# Patient Record
Sex: FEMALE | Race: BLACK OR AFRICAN AMERICAN | Employment: UNEMPLOYED | URBAN - METROPOLITAN AREA
[De-identification: names, ages, dates, MRNs, and addresses within clinical notes are randomized per-mention and may not be internally consistent; named-entity substitution may affect disease eponyms.]

---

## 2017-03-13 ENCOUNTER — ED HISTORICAL/CONVERTED ENCOUNTER (OUTPATIENT)
Dept: OTHER | Age: 72
End: 2017-03-13

## 2017-03-24 ENCOUNTER — ED HISTORICAL/CONVERTED ENCOUNTER (OUTPATIENT)
Dept: OTHER | Age: 72
End: 2017-03-24

## 2019-08-04 ENCOUNTER — ED HISTORICAL/CONVERTED ENCOUNTER (OUTPATIENT)
Dept: OTHER | Age: 74
End: 2019-08-04

## 2019-08-22 ENCOUNTER — OP HISTORICAL/CONVERTED ENCOUNTER (OUTPATIENT)
Dept: OTHER | Age: 74
End: 2019-08-22

## 2020-01-29 ENCOUNTER — ED HISTORICAL/CONVERTED ENCOUNTER (OUTPATIENT)
Dept: OTHER | Age: 75
End: 2020-01-29

## 2020-06-04 ENCOUNTER — ED HISTORICAL/CONVERTED ENCOUNTER (OUTPATIENT)
Dept: OTHER | Age: 75
End: 2020-06-04

## 2020-09-27 ENCOUNTER — APPOINTMENT (OUTPATIENT)
Dept: GENERAL RADIOLOGY | Age: 75
End: 2020-09-27
Attending: PHYSICIAN ASSISTANT
Payer: MEDICARE

## 2020-09-27 ENCOUNTER — HOSPITAL ENCOUNTER (EMERGENCY)
Age: 75
Discharge: HOME | End: 2020-09-27
Payer: MEDICARE

## 2020-09-27 VITALS
WEIGHT: 183 LBS | HEART RATE: 70 BPM | RESPIRATION RATE: 17 BRPM | OXYGEN SATURATION: 98 % | TEMPERATURE: 98.7 F | BODY MASS INDEX: 39.48 KG/M2 | SYSTOLIC BLOOD PRESSURE: 144 MMHG | DIASTOLIC BLOOD PRESSURE: 77 MMHG | HEIGHT: 57 IN

## 2020-09-27 DIAGNOSIS — E11.65 TYPE 2 DIABETES MELLITUS WITH HYPERGLYCEMIA, WITH LONG-TERM CURRENT USE OF INSULIN (HCC): Primary | ICD-10-CM

## 2020-09-27 DIAGNOSIS — Z79.4 TYPE 2 DIABETES MELLITUS WITH HYPERGLYCEMIA, WITH LONG-TERM CURRENT USE OF INSULIN (HCC): Primary | ICD-10-CM

## 2020-09-27 LAB
ALBUMIN SERPL-MCNC: 3.5 G/DL (ref 3.5–5)
ALBUMIN/GLOB SERPL: 0.8 {RATIO} (ref 1.1–2.2)
ALP SERPL-CCNC: 86 U/L (ref 45–117)
ALT SERPL-CCNC: 20 U/L (ref 12–78)
ANION GAP SERPL CALC-SCNC: 7 MMOL/L (ref 5–15)
APPEARANCE UR: CLEAR
AST SERPL W P-5'-P-CCNC: 19 U/L (ref 15–37)
ATRIAL RATE: 75 BPM
BACTERIA URNS QL MICRO: NEGATIVE /HPF
BACTERIA URNS QL MICRO: NEGATIVE /HPF
BASE EXCESS BLDV CALC-SCNC: 0.9 MMOL/L (ref 0–2)
BASOPHILS # BLD: 0 K/UL (ref 0–0.1)
BASOPHILS NFR BLD: 1 % (ref 0–1)
BDY SITE: ABNORMAL
BILIRUB SERPL-MCNC: 0.3 MG/DL (ref 0.2–1)
BILIRUB UR QL: NEGATIVE
BUN SERPL-MCNC: 31 MG/DL (ref 6–20)
BUN/CREAT SERPL: 32 (ref 12–20)
CA-I BLD-MCNC: 9.8 MG/DL (ref 8.5–10.1)
CALCULATED P AXIS, ECG09: 34 DEGREES
CALCULATED R AXIS, ECG10: 17 DEGREES
CALCULATED T AXIS, ECG11: 27 DEGREES
CHLORIDE SERPL-SCNC: 100 MMOL/L (ref 97–108)
CO2 SERPL-SCNC: 26 MMOL/L (ref 21–32)
COHGB MFR BLD: 1.6 % (ref 0–3)
COLOR UR: ABNORMAL
CREAT SERPL-MCNC: 0.97 MG/DL (ref 0.55–1.02)
DIAGNOSIS, 93000: NORMAL
DIFFERENTIAL METHOD BLD: ABNORMAL
EOSINOPHIL # BLD: 0.1 K/UL (ref 0–0.4)
EOSINOPHIL NFR BLD: 2 % (ref 0–7)
EPAP/CPAP/PEEP, PAPEEP: 0
ERYTHROCYTE [DISTWIDTH] IN BLOOD BY AUTOMATED COUNT: 13.6 % (ref 11.5–14.5)
FIO2 ON VENT: 21 %
GLOBULIN SER CALC-MCNC: 4.5 G/DL (ref 2–4)
GLUCOSE BLD STRIP.AUTO-MCNC: 311 MG/DL (ref 65–100)
GLUCOSE SERPL-MCNC: 293 MG/DL (ref 65–100)
GLUCOSE UR STRIP.AUTO-MCNC: >300 MG/DL
HCO3 BLDV-SCNC: 25 MMOL/L (ref 22–26)
HCT VFR BLD AUTO: 38.3 % (ref 35–47)
HGB BLD OXIMETRY-MCNC: 13.2 G/DL (ref 12–16)
HGB BLD-MCNC: 12.3 % (ref 11.5–16)
HGB UR QL STRIP: NEGATIVE
IMM GRANULOCYTES # BLD AUTO: 0.1 K/UL (ref 0–0.04)
IMM GRANULOCYTES NFR BLD AUTO: 1 % (ref 0–0.5)
KETONES UR QL STRIP.AUTO: NEGATIVE MG/DL
LACTATE SERPL-SCNC: 1.2 MMOL/L (ref 0.4–2)
LEUKOCYTE ESTERASE UR QL STRIP.AUTO: ABNORMAL
LYMPHOCYTES # BLD: 2.7 K/UL (ref 0.8–3.5)
LYMPHOCYTES NFR BLD: 46 % (ref 12–49)
MAGNESIUM SERPL-MCNC: 2.4 MG/DL (ref 1.6–2.4)
MCH RBC QN AUTO: 29 PG (ref 26–34)
MCHC RBC AUTO-ENTMCNC: 32.1 G/DL (ref 30–36.5)
MCV RBC AUTO: 90.3 FL (ref 80–99)
METHGB MFR BLD: 0.8 % (ref 0–3)
MONOCYTES # BLD: 0.4 K/UL (ref 0–1)
MONOCYTES NFR BLD: 6 % (ref 5–13)
NEUTS SEG # BLD: 2.5 K/UL (ref 1.8–8)
NEUTS SEG NFR BLD: 44 % (ref 32–75)
NITRITE UR QL STRIP.AUTO: NEGATIVE
P-R INTERVAL, ECG05: 186 MS
PCO2 BLDV: 50 MMHG (ref 40–50)
PERFORMED BY, TECHID: ABNORMAL
PH BLDV: 7.34 [PH] (ref 7.31–7.41)
PH UR STRIP: 7 [PH] (ref 5–8)
PLATELET # BLD AUTO: 202 K/UL (ref 150–400)
PMV BLD AUTO: 11.9 FL (ref 8.9–12.9)
PO2 BLDV: 96 MMHG (ref 36–42)
POTASSIUM SERPL-SCNC: 4.7 MMOL/L (ref 3.5–5.1)
PROT SERPL-MCNC: 8 G/DL (ref 6.4–8.2)
PROT UR STRIP-MCNC: NEGATIVE MG/DL
Q-T INTERVAL, ECG07: 366 MS
QRS DURATION, ECG06: 90 MS
QTC CALCULATION (BEZET), ECG08: 408 MS
RBC # BLD AUTO: 4.24 M/UL (ref 3.8–5.2)
RBC #/AREA URNS HPF: ABNORMAL /HPF (ref 0–5)
RBC #/AREA URNS HPF: NORMAL /HPF (ref 0–5)
SAO2 % BLDV: 96 % (ref 60–80)
SAO2% DEVICE SAO2% SENSOR NAME: ABNORMAL
SODIUM SERPL-SCNC: 133 MMOL/L (ref 136–145)
SP GR UR REFRACTOMETRY: 1.02 (ref 1–1.03)
TROPONIN I SERPL-MCNC: <0.05 NG/ML
UROBILINOGEN UR QL STRIP.AUTO: 0.1 EU/DL (ref 0.1–1)
VENTRICULAR RATE, ECG03: 75 BPM
WBC # BLD AUTO: 5.6 K/UL (ref 3.6–11)
WBC URNS QL MICRO: ABNORMAL /HPF (ref 0–4)
WBC URNS QL MICRO: NORMAL /HPF (ref 0–4)

## 2020-09-27 PROCEDURE — 99284 EMERGENCY DEPT VISIT MOD MDM: CPT

## 2020-09-27 PROCEDURE — 80053 COMPREHEN METABOLIC PANEL: CPT

## 2020-09-27 PROCEDURE — 93005 ELECTROCARDIOGRAM TRACING: CPT

## 2020-09-27 PROCEDURE — 82962 GLUCOSE BLOOD TEST: CPT

## 2020-09-27 PROCEDURE — 74011250636 HC RX REV CODE- 250/636: Performed by: PHYSICIAN ASSISTANT

## 2020-09-27 PROCEDURE — 84484 ASSAY OF TROPONIN QUANT: CPT

## 2020-09-27 PROCEDURE — 82803 BLOOD GASES ANY COMBINATION: CPT

## 2020-09-27 PROCEDURE — 83605 ASSAY OF LACTIC ACID: CPT

## 2020-09-27 PROCEDURE — 71045 X-RAY EXAM CHEST 1 VIEW: CPT

## 2020-09-27 PROCEDURE — 81001 URINALYSIS AUTO W/SCOPE: CPT

## 2020-09-27 PROCEDURE — 83735 ASSAY OF MAGNESIUM: CPT

## 2020-09-27 PROCEDURE — 85025 COMPLETE CBC W/AUTO DIFF WBC: CPT

## 2020-09-27 RX ADMIN — SODIUM CHLORIDE 1000 ML: 9 INJECTION, SOLUTION INTRAVENOUS at 13:01

## 2020-09-27 NOTE — ED TRIAGE NOTES
Pt states that her sugar this morning was about 422. Took her insulin this morning without much change in accu check. Pt also c/o headache.

## 2020-09-27 NOTE — ED PROVIDER NOTES
EMERGENCY DEPARTMENT HISTORY AND PHYSICAL EXAM      Date: 9/27/2020  Patient Name: Nubia Almeida    History of Presenting Illness     Chief Complaint   Patient presents with    High Blood Sugar       History Provided By: Patient    HPI: Nubia Almeida, 76 y.o. female with a past medical history significant for diabetes on Lantus 62 units QD and Novolog 14 units BID, HTN, anxiety, arthritis, ambulatory with cane at baseline who presents to the ED with cc of hyperglycemia since this morning. Patient reports about 2 weeks ago her PCP discontinued metformin 1000 mg twice daily that she was taking due to side effects of diarrhea. Since then, her blood sugar has been steadily going up and reaching the 300s-400s. This morning she checked her blood sugar and found it to be 422. She then took Lantus 62 units and NovoLog 14 units and notes her sugar did not go down much. Patient also complains of nausea, polyuria, lightheadedness, diffuse intermittent headaches, nonradiating right-sided achy chest pain, and intermittent diarrhea x3 days. No recent fall, injury, trauma. Denies history of heart disease. Patient denies fever, chills, shortness of breath, vomiting, hematochezia, hematemesis, melena, abdominal pain, flank pain, back pain, visual changes, numbness, tingling. There are no other complaints, changes, or physical findings at this time. PCP: Edi Sol MD    Current Outpatient Medications   Medication Sig Dispense Refill    INCRUSE ELLIPTA 62.5 mcg/actuation dsdv Take 1 Puff by mouth daily. 5    ARIPiprazole (ABILIFY) 2 mg tablet Take 1 Tab by mouth nightly. Take to block agitation  1    gabapentin (NEURONTIN) 100 mg capsule Take 2 Caps by mouth three (3) times daily. 2    ibuprofen (MOTRIN) 600 mg tablet Take 1 Tab by mouth three (3) times daily. Take for arthritis  1    NOVOLIN 70/30 100 unit/mL (70-30) injection by SubCUTAneous route two (2) times a day.  Take 60 units in the AM and 54 units in the PM  Indications: PATIENT TAKES 64 IN THE AM AND 54 IN THE PM  1    levocetirizine (XYZAL) 5 mg tablet Take 1 Tab by mouth nightly. allergies  1    losartan (COZAAR) 100 mg tablet Take 1 Tab by mouth daily. hypertention  1    montelukast (SINGULAIR) 10 mg tablet Take 1 Tab by mouth every evening. allergies  1    NITROSTAT 0.4 mg SL tablet Take 1 Tab by mouth. Place one tablet under tongue PRN for chest pain, may repeat every 5 minutes for 2 more times then call 911  1    Insulin Syringe-Needle U-100 1 mL 31 x 5/16\" syrg   1    tolterodine ER (DETROL LA) 4 mg ER capsule Take 1 Cap by mouth daily. 0    SOTALOL HCL (SOTALOL PO) Take 80 mg by mouth two (2) times a day.  cyclobenzaprine (FLEXERIL) 10 mg tablet Take 1 Tab by mouth three (3) times daily as needed for Muscle Spasm(s). 20 Tab 0       Past History     Past Medical History:  Past Medical History:   Diagnosis Date    Acid indigestion 3/24/2016    Anxiety 3/24/2016    Arthritis     Asthma     Asthma 3/24/2016    Blurred vision, bilateral 3/24/2016    Chest pain 3/24/2016    sometimes    Constipation 3/24/2016    Coughing 3/24/2016    Diabetes ([address]     Dizziness 3/24/2016    Fast heart beat 3/24/2016    sometimes    Frequency of urination 3/24/2016    Frequent headaches 3/24/2016    Heart failure ([address]     Hemorrhoids 3/24/2016    Hernia, hiatal 3/24/2016    Never treated for it, was told they would not operate on her because of her diabetes.     High blood pressure 3/24/2016    Hypertension     Joint swelling 3/24/2016    Multiple joint pain 3/24/2016    Multiple stiff joints 3/24/2016    Muscle ache 3/24/2016    Muscle pain 3/24/2016    Sinus congestion 3/24/2016    Sleep apnea 3/24/2016    Sleeping difficulty 3/24/2016    Stomach pain 3/24/2016    Only sometimes    Stress 3/24/2016    Type II diabetes mellitus ([address] 3/24/2016       Past Surgical History:  Past Surgical History:   Procedure Laterality Date  CARDIAC SURG PROCEDURE UNLIST       DELIVERY ONLY      COLONOSCOPY      HX CATARACT REMOVAL      both eyes, cyst on left and floaters    HX  SECTION      HX CHOLECYSTECTOMY      HX CORONARY STENT PLACEMENT      HX GYN      HX HYSTERECTOMY         Family History:  Family History   Problem Relation Age of Onset    Heart Disease Mother     Osteoporosis Mother     Heart Disease Father     Diabetes Sister     Cancer Maternal Aunt     Diabetes Maternal Aunt        Social History:  Social History     Tobacco Use    Smoking status: Former Smoker     Last attempt to quit: 3/24/1980     Years since quittin.5    Smokeless tobacco: Never Used   Substance Use Topics    Alcohol use: No    Drug use: Not on file       Allergies: Allergies   Allergen Reactions    Azithromycin Other (comments)     hallucinations    Benadryl [Diphenhydramine Hcl] Other (comments)     hallucinations    Seroquel [Quetiapine] Other (comments)     Hallucinations.  Benadryl [Diphenhydramine Hcl] Other (comments)     hallucinations    Erythromycin Other (comments)     halllucinations    Seroquel [Quetiapine] Other (comments)     hallucinations         Review of Systems     Review of Systems   Constitutional: Negative for appetite change, chills, diaphoresis, fatigue, fever and unexpected weight change. HENT: Negative. Respiratory: Negative for cough, chest tightness, shortness of breath and wheezing. Cardiovascular: Positive for chest pain. Negative for palpitations. Gastrointestinal: Positive for diarrhea and nausea. Negative for abdominal distention, abdominal pain, anal bleeding, blood in stool, constipation and vomiting. Denies hematemesis   Endocrine: Positive for polyuria. +hyperglycemia   Genitourinary: Positive for frequency. Negative for flank pain, hematuria and urgency. Musculoskeletal: Negative for back pain, neck pain and neck stiffness. Skin: Negative for rash. Neurological: Positive for light-headedness and headaches. Negative for dizziness and weakness. Psychiatric/Behavioral: Negative. All other systems reviewed and are negative. Physical Exam     Physical Exam  Vitals signs and nursing note reviewed. Constitutional:       General: She is not in acute distress. Appearance: Normal appearance. She is well-developed. She is not ill-appearing, toxic-appearing or diaphoretic. Comments: Very pleasant elderly AA female   HENT:      Head: Normocephalic and atraumatic. Nose: Nose normal. No congestion or rhinorrhea. Mouth/Throat:      Mouth: Mucous membranes are moist.      Pharynx: Oropharynx is clear. No oropharyngeal exudate or posterior oropharyngeal erythema. Eyes:      General: No scleral icterus. Conjunctiva/sclera: Conjunctivae normal.      Pupils: Pupils are equal, round, and reactive to light. Neck:      Musculoskeletal: Normal range of motion and neck supple. No neck rigidity or muscular tenderness. Cardiovascular:      Rate and Rhythm: Normal rate and regular rhythm. Pulses:           Radial pulses are 2+ on the right side and 2+ on the left side. Dorsalis pedis pulses are 2+ on the right side and 2+ on the left side. Heart sounds: No murmur. No friction rub. No gallop. Comments: Reproducible anterior chest wall TTP  Pulmonary:      Effort: Pulmonary effort is normal. No tachypnea, accessory muscle usage, respiratory distress or retractions. Breath sounds: Normal breath sounds. No stridor. No decreased breath sounds, wheezing, rhonchi or rales. Chest:      Chest wall: No tenderness. Abdominal:      General: Bowel sounds are normal. There is no distension. Palpations: Abdomen is soft. There is no mass. Tenderness: There is no abdominal tenderness. There is no right CVA tenderness, left CVA tenderness, guarding or rebound. Musculoskeletal: Normal range of motion.          General: No deformity. Right lower leg: No edema. Left lower leg: No edema. Skin:     General: Skin is warm and dry. Capillary Refill: Capillary refill takes less than 2 seconds. Coloration: Skin is not jaundiced or pale. Findings: No bruising, erythema or rash. Neurological:      General: No focal deficit present. Mental Status: She is alert and oriented to person, place, and time. Mental status is at baseline. Sensory: Sensation is intact. Motor: Motor function is intact. Psychiatric:         Mood and Affect: Mood normal.         Behavior: Behavior normal. Behavior is cooperative. Thought Content: Thought content normal.         Judgment: Judgment normal.         Diagnostic Study Results     Labs -     Recent Results (from the past 12 hour(s))   GLUCOSE, POC    Collection Time: 09/27/20 11:41 AM   Result Value Ref Range    Glucose (POC) 311 (H) 65 - 100 mg/dL    Performed by Rylie Giordaon    CBC WITH AUTOMATED DIFF    Collection Time: 09/27/20 12:15 PM   Result Value Ref Range    WBC 5.6 3.6 - 11.0 K/uL    RBC 4.24 3.80 - 5.20 M/uL    HGB 12.3 11.5 - 16.0 %    HCT 38.3 35.0 - 47.0 %    MCV 90.3 80.0 - 99.0 FL    MCH 29.0 26.0 - 34.0 PG    MCHC 32.1 30.0 - 36.5 g/dL    RDW 13.6 11.5 - 14.5 %    PLATELET 850 216 - 009 K/uL    MPV 11.9 8.9 - 12.9 FL    NEUTROPHILS 44 32 - 75 %    LYMPHOCYTES 46 12 - 49 %    MONOCYTES 6 5 - 13 %    EOSINOPHILS 2 0 - 7 %    BASOPHILS 1 0 - 1 %    IMMATURE GRANULOCYTES 1 (H) 0.0 - 0.5 %    ABS. NEUTROPHILS 2.5 1.8 - 8.0 K/UL    ABS. LYMPHOCYTES 2.7 0.8 - 3.5 K/UL    ABS. MONOCYTES 0.4 0.0 - 1.0 K/UL    ABS. EOSINOPHILS 0.1 0.0 - 0.4 K/UL    ABS. BASOPHILS 0.0 0.0 - 0.1 K/UL    ABS. IMM.  GRANS. 0.1 (H) 0.00 - 0.04 K/UL    DF AUTOMATED     METABOLIC PANEL, COMPREHENSIVE    Collection Time: 09/27/20 12:15 PM   Result Value Ref Range    Sodium 133 (L) 136 - 145 mmol/L    Potassium 4.7 3.5 - 5.1 mmol/L    Chloride 100 97 - 108 mmol/L    CO2 26 21 - 32 mmol/L    Anion gap 7 5 - 15 mmol/L    Glucose 293 (H) 65 - 100 mg/dL    BUN 31 (H) 6 - 20 mg/dL    Creatinine 0.97 0.55 - 1.02 mg/dL    BUN/Creatinine ratio 32 (H) 12 - 20      GFR est AA >60 >60 ml/min/1.73m2    GFR est non-AA 56 (L) >60 ml/min/1.73m2    Calcium 9.8 8.5 - 10.1 mg/dL    Bilirubin, total 0.3 0.2 - 1.0 mg/dL    AST (SGOT) 19 15 - 37 U/L    ALT (SGPT) 20 12 - 78 U/L    Alk.  phosphatase 86 45 - 117 U/L    Protein, total 8.0 6.4 - 8.2 g/dL    Albumin 3.5 3.5 - 5.0 g/dL    Globulin 4.5 (H) 2.0 - 4.0 g/dL    A-G Ratio 0.8 (L) 1.1 - 2.2     TROPONIN I    Collection Time: 09/27/20 12:15 PM   Result Value Ref Range    Troponin-I, Qt. <0.05 <0.05 ng/mL   LACTIC ACID    Collection Time: 09/27/20 12:15 PM   Result Value Ref Range    Lactic acid 1.2 0.4 - 2.0 mmol/L   URINALYSIS W/ RFLX MICROSCOPIC    Collection Time: 09/27/20 12:15 PM   Result Value Ref Range    Color Yellow/Straw      Appearance Clear Clear      Specific gravity 1.022 1.003 - 1.030      pH (UA) 7.0 5.0 - 8.0      Protein Negative Negative mg/dL    Glucose >300 (A) Negative mg/dL    Ketone Negative Negative mg/dL    Bilirubin Negative Negative      Blood Negative Negative      Urobilinogen 0.1 0.1 - 1.0 EU/dL    Nitrites Negative Negative      Leukocyte Esterase Small (A) Negative      WBC 0-4 0 - 4 /hpf    RBC 5-10 0 - 5 /hpf    Bacteria Negative Negative /hpf   MAGNESIUM    Collection Time: 09/27/20 12:15 PM   Result Value Ref Range    Magnesium 2.4 1.6 - 2.4 mg/dL   VENOUS BLOOD GAS    Collection Time: 09/27/20 12:15 PM   Result Value Ref Range    VENOUS PH 7.34 7.31 - 7.41      VENOUS PCO2 50.0 40 - 50 mmHg    VENOUS PO2 96 (H) 36 - 42 mmHg    VENOUS O2 SATURATION 96 (H) 60 - 80 %    VENOUS BICARBONATE 25 22 - 26 mmol/L    VENOUS BASE EXCESS 0.9 0 - 2 mmol/L    O2 METHOD RA      FIO2 21.0 %    EPAP/CPAP/PEEP 0      SITE Right Radial      Carboxy-Hgb 1.6 0 - 3 %    Methemoglobin 0.8 0 - 3 %    tHb 13.2 12.0 - 16.0 g/dL   URINE MICROSCOPIC Collection Time: 09/27/20 12:15 PM   Result Value Ref Range    WBC 0-4 0 - 4 /hpf    RBC 5-10 0 - 5 /hpf    Bacteria Negative Negative /hpf       Radiologic Studies -   CXR Results  (Last 48 hours)               09/27/20 1235  XR CHEST PORT Final result    Impression:  Impression:  No demonstrated acute cardiopulmonary disease. Narrative:  Exam: AP chest       Comparison: 1/30/2020       Number of views: 1       Findings: The cardiac, mediastinal, and hilar shadows are within normal limits. The exam is negative for evidence of  pleural disease. The lungs are clear. Medical Decision Making and ED Course   I am the first provider for this patient. I reviewed the vital signs, available nursing notes, past medical history, past surgical history, family history and social history. Vital Signs-Reviewed the patient's vital signs. Patient Vitals for the past 12 hrs:   Temp Pulse Resp BP SpO2   09/27/20 1331  70 17 (!) 144/77    09/27/20 1139 98.7 °F (37.1 °C) 89 16 112/71 98 %       EKG interpretation: (Preliminary) 1213  Rhythm: normal sinus rhythm with sinus arrhythmia; and regular . Rate (approx.): 75; Axis: normal; P wave: normal; QRS interval: normal ; ST/T wave: normal;       Records Reviewed: Nursing Notes, Old Medical Records and Previous Laboratory Studies    The patient presents with hyperglycemia with a differential diagnosis of diabetes with hyperglycemia, HHNK, electrolyte abnormality, ACS, UTI      Provider Notes (Medical Decision Making):     MDM  Number of Diagnoses or Management Options  Type 2 diabetes mellitus with hyperglycemia, with long-term current use of insulin [facility]):   Diagnosis management comments:     76year-old with hyperglycemia. Work-up revealed hyperglycemia but otherwise was unremarkable. No evidence of HH NK or DKA. Will have patient follow-up closely with her PCP as an outpatient for management of her diabetes.   She typically has an endocrinologist but has had issue getting an appointment with them. I recommended she follow-up with her PCP first who could then further help her get an appointment with endocrinology. Patient is nontoxic-appearing and afebrile. She is tolerating p.o. Amount and/or Complexity of Data Reviewed  Clinical lab tests: ordered and reviewed  Tests in the radiology section of CPT®: ordered and reviewed  Review and summarize past medical records: yes    Patient Progress  Patient progress: stable         ED Course:   Initial assessment performed. The patients presenting problems have been discussed, and they are in agreement with the care plan formulated and outlined with them. I have encouraged them to ask questions as they arise throughout their visit. Disposition       DISCHARGE PLAN:  1. Current Discharge Medication List      CONTINUE these medications which have NOT CHANGED    Details   INCRUSE ELLIPTA 62.5 mcg/actuation dsdv Take 1 Puff by mouth daily. Refills: 5      ARIPiprazole (ABILIFY) 2 mg tablet Take 1 Tab by mouth nightly. Take to block agitation  Refills: 1      gabapentin (NEURONTIN) 100 mg capsule Take 2 Caps by mouth three (3) times daily. Refills: 2      ibuprofen (MOTRIN) 600 mg tablet Take 1 Tab by mouth three (3) times daily. Take for arthritis  Refills: 1      NOVOLIN 70/30 100 unit/mL (70-30) injection by SubCUTAneous route two (2) times a day. Take 60 units in the AM and 54 units in the PM  Indications: PATIENT TAKES 64 IN THE AM AND 54 IN THE PM  Refills: 1      levocetirizine (XYZAL) 5 mg tablet Take 1 Tab by mouth nightly. allergies  Refills: 1      losartan (COZAAR) 100 mg tablet Take 1 Tab by mouth daily. hypertention  Refills: 1      montelukast (SINGULAIR) 10 mg tablet Take 1 Tab by mouth every evening. allergies  Refills: 1      NITROSTAT 0.4 mg SL tablet Take 1 Tab by mouth.  Place one tablet under tongue PRN for chest pain, may repeat every 5 minutes for 2 more times then call 911  Refills: 1      Insulin Syringe-Needle U-100 1 mL 31 x 5/16\" syrg Refills: 1      tolterodine ER (DETROL LA) 4 mg ER capsule Take 1 Cap by mouth daily. Refills: 0      SOTALOL HCL (SOTALOL PO) Take 80 mg by mouth two (2) times a day. cyclobenzaprine (FLEXERIL) 10 mg tablet Take 1 Tab by mouth three (3) times daily as needed for Muscle Spasm(s). Qty: 20 Tab, Refills: 0           2. Follow-up Information     Follow up With Specialties Details Why Contact Info    Quintin Mo MD Internal Medicine Call in 1 day  [address] [facility] [address]  [phone]      [facility] EMERGENCY DEPT Emergency Medicine  As needed [address] [place] [address]  [phone]        3. Return to ED if worse     Diagnosis     Clinical Impression:   1. Type 2 diabetes mellitus with hyperglycemia, with long-term current use of insulin ([facility] [address]        Attestations:    Dearl NELL Truong    Please note that this dictation was completed with [email or web address], the computer voice recognition software. Quite often unanticipated grammatical, syntax, homophones, and other interpretive errors are inadvertently transcribed by the computer software. Please disregard these errors. Please excuse any errors that have escaped final proofreading. Thank you.

## 2021-09-22 ENCOUNTER — APPOINTMENT (OUTPATIENT)
Dept: GENERAL RADIOLOGY | Age: 76
End: 2021-09-22
Attending: FAMILY MEDICINE
Payer: MEDICARE

## 2021-09-22 ENCOUNTER — HOSPITAL ENCOUNTER (OUTPATIENT)
Age: 76
Setting detail: OBSERVATION
Discharge: HOME | End: 2021-09-24
Attending: FAMILY MEDICINE | Admitting: HOSPITALIST
Payer: MEDICARE

## 2021-09-22 DIAGNOSIS — R07.9 ACUTE CHEST PAIN: Primary | ICD-10-CM

## 2021-09-22 DIAGNOSIS — I25.2 HISTORY OF MYOCARDIAL INFARCTION: ICD-10-CM

## 2021-09-22 LAB
ANION GAP SERPL CALC-SCNC: 7 MMOL/L (ref 5–15)
BASOPHILS # BLD: 0 K/UL (ref 0–0.1)
BASOPHILS NFR BLD: 0 % (ref 0–1)
BUN SERPL-MCNC: 32 MG/DL (ref 6–20)
BUN/CREAT SERPL: 33 (ref 12–20)
CA-I BLD-MCNC: 9.5 MG/DL (ref 8.5–10.1)
CHLORIDE SERPL-SCNC: 103 MMOL/L (ref 97–108)
CO2 SERPL-SCNC: 27 MMOL/L (ref 21–32)
CREAT SERPL-MCNC: 0.96 MG/DL (ref 0.55–1.02)
DIFFERENTIAL METHOD BLD: ABNORMAL
EOSINOPHIL # BLD: 0.1 K/UL (ref 0–0.4)
EOSINOPHIL NFR BLD: 1 % (ref 0–7)
ERYTHROCYTE [DISTWIDTH] IN BLOOD BY AUTOMATED COUNT: 14 % (ref 11.5–14.5)
GLUCOSE SERPL-MCNC: 155 MG/DL (ref 65–100)
HCT VFR BLD AUTO: 41.3 % (ref 35–47)
HGB BLD-MCNC: 13.2 G/DL (ref 11.5–16)
IMM GRANULOCYTES # BLD AUTO: 0.1 K/UL (ref 0–0.04)
IMM GRANULOCYTES NFR BLD AUTO: 1 % (ref 0–0.5)
LYMPHOCYTES # BLD: 2.4 K/UL (ref 0.8–3.5)
LYMPHOCYTES NFR BLD: 38 % (ref 12–49)
MCH RBC QN AUTO: 29.3 PG (ref 26–34)
MCHC RBC AUTO-ENTMCNC: 32 G/DL (ref 30–36.5)
MCV RBC AUTO: 91.8 FL (ref 80–99)
MONOCYTES # BLD: 0.6 K/UL (ref 0–1)
MONOCYTES NFR BLD: 9 % (ref 5–13)
NEUTS SEG # BLD: 3.3 K/UL (ref 1.8–8)
NEUTS SEG NFR BLD: 51 % (ref 32–75)
PLATELET # BLD AUTO: 216 K/UL (ref 150–400)
PMV BLD AUTO: 10.1 FL (ref 8.9–12.9)
POTASSIUM SERPL-SCNC: 4.1 MMOL/L (ref 3.5–5.1)
RBC # BLD AUTO: 4.5 M/UL (ref 3.8–5.2)
SODIUM SERPL-SCNC: 137 MMOL/L (ref 136–145)
TROPONIN I SERPL-MCNC: <0.05 NG/ML
WBC # BLD AUTO: 6.4 K/UL (ref 3.6–11)

## 2021-09-22 PROCEDURE — 74011250637 HC RX REV CODE- 250/637: Performed by: FAMILY MEDICINE

## 2021-09-22 PROCEDURE — 84484 ASSAY OF TROPONIN QUANT: CPT

## 2021-09-22 PROCEDURE — 71046 X-RAY EXAM CHEST 2 VIEWS: CPT

## 2021-09-22 PROCEDURE — 99285 EMERGENCY DEPT VISIT HI MDM: CPT

## 2021-09-22 PROCEDURE — 80048 BASIC METABOLIC PNL TOTAL CA: CPT

## 2021-09-22 PROCEDURE — 85025 COMPLETE CBC W/AUTO DIFF WBC: CPT

## 2021-09-22 PROCEDURE — 93005 ELECTROCARDIOGRAM TRACING: CPT

## 2021-09-22 PROCEDURE — 36415 COLL VENOUS BLD VENIPUNCTURE: CPT

## 2021-09-22 PROCEDURE — 99284 EMERGENCY DEPT VISIT MOD MDM: CPT

## 2021-09-22 PROCEDURE — 99218 HC RM OBSERVATION: CPT

## 2021-09-22 RX ORDER — GUAIFENESIN 100 MG/5ML
324 LIQUID (ML) ORAL
Status: COMPLETED | OUTPATIENT
Start: 2021-09-22 | End: 2021-09-22

## 2021-09-22 RX ADMIN — ASPIRIN 81 MG CHEWABLE TABLET 324 MG: 81 TABLET CHEWABLE at 21:04

## 2021-09-23 ENCOUNTER — APPOINTMENT (OUTPATIENT)
Dept: NON INVASIVE DIAGNOSTICS | Age: 76
End: 2021-09-23
Attending: INTERNAL MEDICINE
Payer: MEDICARE

## 2021-09-23 ENCOUNTER — APPOINTMENT (OUTPATIENT)
Dept: NUCLEAR MEDICINE | Age: 76
End: 2021-09-23
Attending: INTERNAL MEDICINE
Payer: MEDICARE

## 2021-09-23 LAB
GLUCOSE BLD STRIP.AUTO-MCNC: 134 MG/DL (ref 65–117)
PERFORMED BY, TECHID: ABNORMAL
TROPONIN I SERPL-MCNC: <0.05 NG/ML

## 2021-09-23 PROCEDURE — 99218 HC RM OBSERVATION: CPT

## 2021-09-23 PROCEDURE — 74011000250 HC RX REV CODE- 250: Performed by: HOSPITALIST

## 2021-09-23 PROCEDURE — 82962 GLUCOSE BLOOD TEST: CPT

## 2021-09-23 PROCEDURE — 96372 THER/PROPH/DIAG INJ SC/IM: CPT

## 2021-09-23 PROCEDURE — 74011250636 HC RX REV CODE- 250/636: Performed by: HOSPITALIST

## 2021-09-23 PROCEDURE — 74011250637 HC RX REV CODE- 250/637: Performed by: HOSPITALIST

## 2021-09-23 PROCEDURE — 74011250637 HC RX REV CODE- 250/637: Performed by: INTERNAL MEDICINE

## 2021-09-23 PROCEDURE — 94640 AIRWAY INHALATION TREATMENT: CPT

## 2021-09-23 PROCEDURE — 94760 N-INVAS EAR/PLS OXIMETRY 1: CPT

## 2021-09-23 PROCEDURE — 93017 CV STRESS TEST TRACING ONLY: CPT

## 2021-09-23 PROCEDURE — 93306 TTE W/DOPPLER COMPLETE: CPT

## 2021-09-23 RX ORDER — POLYETHYLENE GLYCOL 3350 17 G/17G
17 POWDER, FOR SOLUTION ORAL DAILY PRN
Status: DISCONTINUED | OUTPATIENT
Start: 2021-09-23 | End: 2021-09-24 | Stop reason: HOSPADM

## 2021-09-23 RX ORDER — CAPSAICIN 0.03 G/100G
CREAM TOPICAL 3 TIMES DAILY
Status: DISCONTINUED | OUTPATIENT
Start: 2021-09-23 | End: 2021-09-24 | Stop reason: HOSPADM

## 2021-09-23 RX ORDER — ACETAMINOPHEN 325 MG/1
650 TABLET ORAL
Status: DISCONTINUED | OUTPATIENT
Start: 2021-09-23 | End: 2021-09-24 | Stop reason: HOSPADM

## 2021-09-23 RX ORDER — NITROGLYCERIN 0.4 MG/1
0.4 TABLET SUBLINGUAL AS NEEDED
Status: DISCONTINUED | OUTPATIENT
Start: 2021-09-23 | End: 2021-09-24 | Stop reason: HOSPADM

## 2021-09-23 RX ORDER — LOSARTAN POTASSIUM 50 MG/1
100 TABLET ORAL DAILY
Status: DISCONTINUED | OUTPATIENT
Start: 2021-09-23 | End: 2021-09-24 | Stop reason: HOSPADM

## 2021-09-23 RX ORDER — ASPIRIN 81 MG/1
81 TABLET ORAL DAILY
Status: DISCONTINUED | OUTPATIENT
Start: 2021-09-24 | End: 2021-09-24 | Stop reason: HOSPADM

## 2021-09-23 RX ORDER — ENOXAPARIN SODIUM 100 MG/ML
40 INJECTION SUBCUTANEOUS DAILY
Status: DISCONTINUED | OUTPATIENT
Start: 2021-09-23 | End: 2021-09-24 | Stop reason: HOSPADM

## 2021-09-23 RX ORDER — ONDANSETRON 2 MG/ML
4 INJECTION INTRAMUSCULAR; INTRAVENOUS
Status: DISCONTINUED | OUTPATIENT
Start: 2021-09-23 | End: 2021-09-24 | Stop reason: HOSPADM

## 2021-09-23 RX ORDER — LORATADINE 10 MG/1
5 TABLET ORAL
Status: DISCONTINUED | OUTPATIENT
Start: 2021-09-23 | End: 2021-09-24 | Stop reason: HOSPADM

## 2021-09-23 RX ORDER — MONTELUKAST SODIUM 10 MG/1
10 TABLET ORAL EVERY EVENING
Status: DISCONTINUED | OUTPATIENT
Start: 2021-09-23 | End: 2021-09-24 | Stop reason: HOSPADM

## 2021-09-23 RX ORDER — SODIUM CHLORIDE 0.9 % (FLUSH) 0.9 %
5-40 SYRINGE (ML) INJECTION AS NEEDED
Status: DISCONTINUED | OUTPATIENT
Start: 2021-09-23 | End: 2021-09-24 | Stop reason: HOSPADM

## 2021-09-23 RX ORDER — ARIPIPRAZOLE 2 MG/1
2 TABLET ORAL
Status: DISCONTINUED | OUTPATIENT
Start: 2021-09-23 | End: 2021-09-24 | Stop reason: HOSPADM

## 2021-09-23 RX ORDER — ONDANSETRON 4 MG/1
4 TABLET, ORALLY DISINTEGRATING ORAL
Status: DISCONTINUED | OUTPATIENT
Start: 2021-09-23 | End: 2021-09-24 | Stop reason: HOSPADM

## 2021-09-23 RX ORDER — PANTOPRAZOLE SODIUM 40 MG/1
40 TABLET, DELAYED RELEASE ORAL ONCE
Status: COMPLETED | OUTPATIENT
Start: 2021-09-23 | End: 2021-09-23

## 2021-09-23 RX ORDER — PANTOPRAZOLE SODIUM 40 MG/1
40 TABLET, DELAYED RELEASE ORAL DAILY
Qty: 30 TABLET | Refills: 2 | Status: SHIPPED | OUTPATIENT
Start: 2021-09-23

## 2021-09-23 RX ORDER — ACETAMINOPHEN 650 MG/1
650 SUPPOSITORY RECTAL
Status: DISCONTINUED | OUTPATIENT
Start: 2021-09-23 | End: 2021-09-24 | Stop reason: HOSPADM

## 2021-09-23 RX ORDER — SODIUM CHLORIDE 0.9 % (FLUSH) 0.9 %
5-40 SYRINGE (ML) INJECTION EVERY 8 HOURS
Status: DISCONTINUED | OUTPATIENT
Start: 2021-09-23 | End: 2021-09-24 | Stop reason: HOSPADM

## 2021-09-23 RX ORDER — FAMOTIDINE 20 MG/1
20 TABLET, FILM COATED ORAL DAILY
Status: DISCONTINUED | OUTPATIENT
Start: 2021-09-23 | End: 2021-09-23

## 2021-09-23 RX ORDER — CYCLOBENZAPRINE HCL 10 MG
10 TABLET ORAL
Status: DISCONTINUED | OUTPATIENT
Start: 2021-09-23 | End: 2021-09-24 | Stop reason: HOSPADM

## 2021-09-23 RX ORDER — IBUPROFEN 600 MG/1
600 TABLET ORAL 3 TIMES DAILY
Status: DISCONTINUED | OUTPATIENT
Start: 2021-09-23 | End: 2021-09-24 | Stop reason: HOSPADM

## 2021-09-23 RX ORDER — GABAPENTIN 100 MG/1
200 CAPSULE ORAL 3 TIMES DAILY
Status: DISCONTINUED | OUTPATIENT
Start: 2021-09-23 | End: 2021-09-24 | Stop reason: HOSPADM

## 2021-09-23 RX ORDER — TROSPIUM CHLORIDE 20 MG/1
20 TABLET, FILM COATED ORAL 2 TIMES DAILY
Status: DISCONTINUED | OUTPATIENT
Start: 2021-09-23 | End: 2021-09-24 | Stop reason: HOSPADM

## 2021-09-23 RX ORDER — SOTALOL HYDROCHLORIDE 80 MG/1
80 TABLET ORAL EVERY 12 HOURS
Status: DISCONTINUED | OUTPATIENT
Start: 2021-09-23 | End: 2021-09-24 | Stop reason: HOSPADM

## 2021-09-23 RX ADMIN — IBUPROFEN 600 MG: 600 TABLET ORAL at 18:30

## 2021-09-23 RX ADMIN — LORATADINE 5 MG: 10 TABLET ORAL at 21:59

## 2021-09-23 RX ADMIN — GABAPENTIN 200 MG: 100 CAPSULE ORAL at 11:04

## 2021-09-23 RX ADMIN — CAPSAICIN: 0.25 CREAM TOPICAL at 22:00

## 2021-09-23 RX ADMIN — IBUPROFEN 600 MG: 600 TABLET ORAL at 11:03

## 2021-09-23 RX ADMIN — IBUPROFEN 600 MG: 600 TABLET ORAL at 21:59

## 2021-09-23 RX ADMIN — TIOTROPIUM BROMIDE INHALATION SPRAY 2 PUFF: 3.12 SPRAY, METERED RESPIRATORY (INHALATION) at 13:08

## 2021-09-23 RX ADMIN — ARIPIPRAZOLE 2 MG: 2 TABLET ORAL at 21:59

## 2021-09-23 RX ADMIN — TROSPIUM CHLORIDE 20 MG: 20 TABLET, FILM COATED ORAL at 11:11

## 2021-09-23 RX ADMIN — Medication 10 ML: at 18:31

## 2021-09-23 RX ADMIN — Medication 10 ML: at 22:00

## 2021-09-23 RX ADMIN — PANTOPRAZOLE SODIUM 40 MG: 40 TABLET, DELAYED RELEASE ORAL at 18:30

## 2021-09-23 RX ADMIN — GABAPENTIN 200 MG: 100 CAPSULE ORAL at 21:59

## 2021-09-23 RX ADMIN — LIDOCAINE HYDROCHLORIDE 30 ML: 20 SOLUTION ORAL; TOPICAL at 08:00

## 2021-09-23 RX ADMIN — SOTALOL HYDROCHLORIDE 80 MG: 80 TABLET ORAL at 11:28

## 2021-09-23 RX ADMIN — MONTELUKAST 10 MG: 10 TABLET, FILM COATED ORAL at 18:30

## 2021-09-23 RX ADMIN — Medication 10 ML: at 11:05

## 2021-09-23 RX ADMIN — LOSARTAN POTASSIUM 100 MG: 50 TABLET, FILM COATED ORAL at 11:03

## 2021-09-23 RX ADMIN — SOTALOL HYDROCHLORIDE 80 MG: 80 TABLET ORAL at 21:59

## 2021-09-23 RX ADMIN — GABAPENTIN 200 MG: 100 CAPSULE ORAL at 18:30

## 2021-09-23 RX ADMIN — ENOXAPARIN SODIUM 40 MG: 40 INJECTION SUBCUTANEOUS at 11:04

## 2021-09-23 RX ADMIN — NITROGLYCERIN 1 INCH: 20 OINTMENT TOPICAL at 18:31

## 2021-09-23 NOTE — PROGRESS NOTES
Dual skin assessment done with Wilner Piper RN. Skin concerns noted include blanchable redness to her coccyx.

## 2021-09-23 NOTE — ED PROVIDER NOTES
EMERGENCY DEPARTMENT HISTORY AND PHYSICAL EXAM      Date: 9/22/2021  Patient Name: Navya Aviles    History of Presenting Illness     Chief Complaint   Patient presents with    Chest Pain   Belching    History Provided By:     HPI: Navya Aviles, is an extremely pleasant 76 y.o. female presenting to the ED with a chief complaint of chest pain. Patient states she developed left-sided chest pain deep behind her left breast this morning at rest.  The pain is achy. Nonradiating. Slightly better now. No shortness of breath. No nausea nor diaphoresis. She is also complaining of excessive belching and indigestion. There are no other complaints, changes, or physical findings at this time. PCP: Mandy Spivey MD    No current facility-administered medications on file prior to encounter. Current Outpatient Medications on File Prior to Encounter   Medication Sig Dispense Refill    INCRUSE ELLIPTA 62.5 mcg/actuation dsdv Take 1 Puff by mouth daily. 5    ARIPiprazole (ABILIFY) 2 mg tablet Take 1 Tab by mouth nightly. Take to block agitation  1    gabapentin (NEURONTIN) 100 mg capsule Take 2 Caps by mouth three (3) times daily. 2    ibuprofen (MOTRIN) 600 mg tablet Take 1 Tab by mouth three (3) times daily. Take for arthritis  1    NOVOLIN 70/30 100 unit/mL (70-30) injection by SubCUTAneous route two (2) times a day. Take 60 units in the AM and 54 units in the PM  Indications: PATIENT TAKES 64 IN THE AM AND 54 IN THE PM  1    levocetirizine (XYZAL) 5 mg tablet Take 1 Tab by mouth nightly. allergies  1    losartan (COZAAR) 100 mg tablet Take 1 Tab by mouth daily. hypertention  1    montelukast (SINGULAIR) 10 mg tablet Take 1 Tab by mouth every evening. allergies  1    NITROSTAT 0.4 mg SL tablet Take 1 Tab by mouth.  Place one tablet under tongue PRN for chest pain, may repeat every 5 minutes for 2 more times then call 911  1    Insulin Syringe-Needle U-100 1 mL 31 x 5/16\" syrg   1    tolterodine ER (DETROL LA) 4 mg ER capsule Take 1 Cap by mouth daily. 0    SOTALOL HCL (SOTALOL PO) Take 80 mg by mouth two (2) times a day.  cyclobenzaprine (FLEXERIL) 10 mg tablet Take 1 Tab by mouth three (3) times daily as needed for Muscle Spasm(s). 20 Tab 0       Past History     Past Medical History:  Past Medical History:   Diagnosis Date    Acid indigestion 3/24/2016    Anxiety 3/24/2016    Arthritis     Asthma     Asthma 3/24/2016    Blurred vision, bilateral 3/24/2016    Chest pain 3/24/2016    sometimes    Constipation 3/24/2016    Coughing 3/24/2016    Diabetes ([address]     Dizziness 3/24/2016    Fast heart beat 3/24/2016    sometimes    Frequency of urination 3/24/2016    Frequent headaches 3/24/2016    Heart failure ([address]     Hemorrhoids 3/24/2016    Hernia, hiatal 3/24/2016    Never treated for it, was told they would not operate on her because of her diabetes.     High blood pressure 3/24/2016    Hypertension     Joint swelling 3/24/2016    Multiple joint pain 3/24/2016    Multiple stiff joints 3/24/2016    Muscle ache 3/24/2016    Muscle pain 3/24/2016    Sinus congestion 3/24/2016    Sleep apnea 3/24/2016    Sleeping difficulty 3/24/2016    Stomach pain 3/24/2016    Only sometimes    Stress 3/24/2016    Type II diabetes mellitus ([address] 3/24/2016       Past Surgical History:  Past Surgical History:   Procedure Laterality Date    COLONOSCOPY      HX CATARACT REMOVAL      both eyes, cyst on left and floaters    HX  SECTION      HX CHOLECYSTECTOMY      HX CORONARY STENT PLACEMENT      HX GYN      HX HYSTERECTOMY      [place] CARDIAC SURG PROCEDURE UNLIST      [place]  DELIVERY ONLY         Family History:  Family History   Problem Relation Age of Onset    Heart Disease Mother     Osteoporosis Mother     Heart Disease Father     Diabetes Sister     Cancer Maternal Aunt     Diabetes Maternal Aunt        Social History:  Social History     Tobacco Use    Smoking status: Former Smoker     Quit date: 3/24/1980     Years since quittin.5    Smokeless tobacco: Never Used   Substance Use Topics    Alcohol use: No    Drug use: Never       Allergies: Allergies   Allergen Reactions    Azithromycin Other (comments)     hallucinations    Benadryl [Diphenhydramine Hcl] Other (comments)     hallucinations    Seroquel [Quetiapine] Other (comments)     Hallucinations.  Benadryl [Diphenhydramine Hcl] Other (comments)     hallucinations    Erythromycin Other (comments)     halllucinations    Seroquel [Quetiapine] Other (comments)     hallucinations         Review of Systems     Review of Systems   Constitutional: Negative for activity change, appetite change, chills, fatigue and fever. HENT: Negative for congestion and sore throat. Eyes: Negative for photophobia and visual disturbance. Respiratory: Negative for cough, shortness of breath and wheezing. Cardiovascular: Positive for chest pain. Negative for palpitations and leg swelling. Gastrointestinal: Negative for abdominal pain, diarrhea, nausea and vomiting. Belching   Endocrine: Negative for cold intolerance and heat intolerance. Musculoskeletal: Negative for gait problem and joint swelling. Skin: Negative for color change and rash. Neurological: Negative for dizziness and headaches. Physical Exam     Physical Exam  Constitutional:       Appearance: She is well-developed. HENT:      Head: Normocephalic and atraumatic. Mouth/Throat:      Mouth: Mucous membranes are moist.      Pharynx: Oropharynx is clear. Eyes:      Conjunctiva/sclera: Conjunctivae normal.      Pupils: Pupils are equal, round, and reactive to light. Cardiovascular:      Rate and Rhythm: Normal rate and regular rhythm. Heart sounds: No murmur heard. Pulmonary:      Effort: No respiratory distress. Breath sounds: No stridor. No wheezing, rhonchi or rales.    Abdominal:      General: There is no distension. Tenderness: There is no abdominal tenderness. There is no rebound. Musculoskeletal:      Cervical back: Normal range of motion and neck supple. Skin:     General: Skin is warm and dry. Neurological:      General: No focal deficit present. Mental Status: She is alert and oriented to person, place, and time. Psychiatric:         Mood and Affect: Mood normal.         Behavior: Behavior normal.          Lab and Diagnostic Study Results     Labs -     Recent Results (from the past 12 hour(s))   CBC WITH AUTOMATED DIFF    Collection Time: 09/22/21  9:15 PM   Result Value Ref Range    WBC 6.4 3.6 - 11.0 K/uL    RBC 4.50 3.80 - 5.20 M/uL    HGB 13.2 11.5 - 16.0 g/dL    HCT 41.3 35.0 - 47.0 %    MCV 91.8 80.0 - 99.0 FL    MCH 29.3 26.0 - 34.0 PG    MCHC 32.0 30.0 - 36.5 g/dL    RDW 14.0 11.5 - 14.5 %    PLATELET 774 872 - 339 K/uL    MPV 10.1 8.9 - 12.9 FL    NEUTROPHILS 51 32 - 75 %    LYMPHOCYTES 38 12 - 49 %    MONOCYTES 9 5 - 13 %    EOSINOPHILS 1 0 - 7 %    BASOPHILS 0 0 - 1 %    IMMATURE GRANULOCYTES 1 (H) 0.0 - 0.5 %    ABS. NEUTROPHILS 3.3 1.8 - 8.0 K/UL    ABS. LYMPHOCYTES 2.4 0.8 - 3.5 K/UL    ABS. MONOCYTES 0.6 0.0 - 1.0 K/UL    ABS. EOSINOPHILS 0.1 0.0 - 0.4 K/UL    ABS. BASOPHILS 0.0 0.0 - 0.1 K/UL    ABS. IMM.  GRANS. 0.1 (H) 0.00 - 0.04 K/UL    DF AUTOMATED     TROPONIN I    Collection Time: 09/22/21  9:15 PM   Result Value Ref Range    Troponin-I, Qt. <0.05 <3.75 ng/mL   METABOLIC PANEL, BASIC    Collection Time: 09/22/21  9:15 PM   Result Value Ref Range    Sodium 137 136 - 145 mmol/L    Potassium 4.1 3.5 - 5.1 mmol/L    Chloride 103 97 - 108 mmol/L    CO2 27 21 - 32 mmol/L    Anion gap 7 5 - 15 mmol/L    Glucose 155 (H) 65 - 100 mg/dL    BUN 32 (H) 6 - 20 mg/dL    Creatinine 0.96 0.55 - 1.02 mg/dL    BUN/Creatinine ratio 33 (H) 12 - 20      GFR est AA >60 >60 ml/min/1.73m2    GFR est non-AA 57 (L) >60 ml/min/1.73m2    Calcium 9.5 8.5 - 10.1 mg/dL   TROPONIN I    Collection Time: 09/22/21 11:30 PM   Result Value Ref Range    Troponin-I, Qt. <0.05 <0.05 ng/mL       Radiologic Studies -   @lastxrresult@  CT Results  (Last 48 hours)    None        CXR Results  (Last 48 hours)               09/22/21 2116  XR CHEST PA LAT Final result    Impression:  Mild-to-moderate enlargement of cardiopericardial silhouette. No   evidence of pulmonary edema, air space pneumonia, or pleural effusion. No   evidence of pneumothorax. Narrative:  Chest, 2 views. No comparisons. Medical Decision Making   - I am the first provider for this patient. - I reviewed the vital signs, available nursing notes, past medical history, past surgical history, family history and social history. - Initial assessment performed. The patients presenting problems have been discussed, and they are in agreement with the care plan formulated and outlined with them. I have encouraged them to ask questions as they arise throughout their visit. Vital Signs-Reviewed the patient's vital signs. Patient Vitals for the past 12 hrs:   Temp Pulse Resp BP SpO2   09/23/21 0112  81 18 134/81 98 %   09/22/21 2044 98.5 °F (36.9 °C) 96 20 (!) 165/91 95 %         ED Course/ Provider Notes (Medical Decision Making):     Patient presented to the emergency department with a chief complaint of chest pain and belching  On examination the patient is nontoxic and well-appearing. Vitals were reviewed per above. Heart score 6. He was given aspirin. EKG normal sinus rhythm no STEMI. Troponin x2 less than 0.05. Chest x-ray shows Mild-to-moderate enlargement of cardiopericardial silhouette. No  evidence of pulmonary edema, air space pneumonia, or pleural effusion. No  evidence of pneumothorax. The case was discussed in detail with Dr. Sonia Torres who graciously accepted the admission for further work-up and management.     Procedures   Medical Decision Makingedical Decision Making  Performed by: Asuncion Seymour DO  Procedures None       Disposition   Disposition:     Admit      Diagnosis     Clinical Impression:    1. Acute chest pain    2. History of myocardial infarction        Attestations:    Leesa Prado, DO    Please note that this dictation was completed with [employer], the computer voice recognition software. Quite often unanticipated grammatical, syntax, homophones, and other interpretive errors are inadvertently transcribed by the computer software. Please disregard these errors. Please excuse any errors that have escaped final proofreading. Thank you.

## 2021-09-23 NOTE — PROGRESS NOTES
Patient admitted in room 473, patient is alert and oriented x4, follows commands, reports mid abdomen pain, back pain. Dr. Scott Brought called and informed about patient's arrival to the unit.

## 2021-09-23 NOTE — H&P
History and Physical    Subjective:   Chief Complaint : chest pain with epigastric and knee pain since AM  Source of information : patient     History of present illness:     75F, coming from [facility] to see cardiology    She has a h/o severe GERD     She states she presents with chest pain, likely under the breasts, 7/10, and it started after she had her meal last night, mostly under her breast, associated with belching and nausea, but no vomiting. She states she is still having pain. ER: troponin x2 negative , to EKG ST T wave changes     Denies palpitations, shortness of breath, fall, syncope      Past Medical History:   Diagnosis Date    Acid indigestion 3/24/2016    Anxiety 3/24/2016    Arthritis     Asthma     Asthma 3/24/2016    Blurred vision, bilateral 3/24/2016    Chest pain 3/24/2016    sometimes    Constipation 3/24/2016    Coughing 3/24/2016    Diabetes ([address]     Dizziness 3/24/2016    Fast heart beat 3/24/2016    sometimes    Frequency of urination 3/24/2016    Frequent headaches 3/24/2016    Heart failure ([address]     Hemorrhoids 3/24/2016    Hernia, hiatal 3/24/2016    Never treated for it, was told they would not operate on her because of her diabetes.     High blood pressure 3/24/2016    Hypertension     Joint swelling 3/24/2016    Multiple joint pain 3/24/2016    Multiple stiff joints 3/24/2016    Muscle ache 3/24/2016    Muscle pain 3/24/2016    Sinus congestion 3/24/2016    Sleep apnea 3/24/2016    Sleeping difficulty 3/24/2016    Stomach pain 3/24/2016    Only sometimes    Stress 3/24/2016    Type II diabetes mellitus ([address] 3/24/2016     Past Surgical History:   Procedure Laterality Date    COLONOSCOPY      HX CATARACT REMOVAL      both eyes, cyst on left and floaters    HX  SECTION      HX CHOLECYSTECTOMY      HX CORONARY STENT PLACEMENT      HX GYN      HX HYSTERECTOMY      [place] CARDIAC SURG PROCEDURE UNLIST      [place]  DELIVERY ONLY Family History   Problem Relation Age of Onset    Heart Disease Mother     Osteoporosis Mother     Heart Disease Father     Diabetes Sister     Cancer Maternal Aunt     Diabetes Maternal Aunt       Social History     Tobacco Use    Smoking status: Former Smoker     Quit date: 3/24/1980     Years since quittin.5    Smokeless tobacco: Never Used   Substance Use Topics    Alcohol use: No       Prior to Admission medications    Medication Sig Start Date End Date Taking? Authorizing Provider   NOVOLIN 70/30 100 unit/mL (70-30) injection by SubCUTAneous route two (2) times a day. Take 60 units in the AM and 54 units in the PM  Indications: PATIENT TAKES 64 IN THE AM AND 54 IN THE PM 16  Yes Provider, Historical   losartan (COZAAR) 100 mg tablet Take 1 Tab by mouth daily. hypertention 16  Yes Provider, Historical   SOTALOL HCL (SOTALOL PO) Take 80 mg by mouth two (2) times a day. Yes Other, MD Veronica   INCRUSE ELLIPTA 62.5 mcg/actuation dsdv Take 1 Puff by mouth daily. 16   Provider, Historical   ARIPiprazole (ABILIFY) 2 mg tablet Take 1 Tab by mouth nightly. Take to block agitation 16   Provider, Historical   gabapentin (NEURONTIN) 100 mg capsule Take 2 Caps by mouth three (3) times daily. 16   Provider, Historical   ibuprofen (MOTRIN) 600 mg tablet Take 1 Tab by mouth three (3) times daily. Take for arthritis 16   Provider, Historical   levocetirizine (XYZAL) 5 mg tablet Take 1 Tab by mouth nightly. allergies 12/22/15   Provider, Historical   montelukast (SINGULAIR) 10 mg tablet Take 1 Tab by mouth every evening. allergies 12/22/15   Provider, Historical   NITROSTAT 0.4 mg SL tablet Take 1 Tab by mouth.  Place one tablet under tongue PRN for chest pain, may repeat every 5 minutes for 2 more times then call 911 16   Provider, Historical   Insulin Syringe-Needle U-100 1 mL 31 x 5/16\" syrg  16   Provider, Historical   tolterodine ER (DETROL LA) 4 mg ER capsule Take 1 Cap by mouth daily. 1/18/16   Provider, Historical   cyclobenzaprine (FLEXERIL) 10 mg tablet Take 1 Tab by mouth three (3) times daily as needed for Muscle Spasm(s). 7/19/11   Stephen Prabhakar MD     Allergies   Allergen Reactions    Azithromycin Other (comments)     hallucinations    Benadryl [Diphenhydramine Hcl] Other (comments)     hallucinations    Seroquel [Quetiapine] Other (comments)     Hallucinations.  Benadryl [Diphenhydramine Hcl] Other (comments)     hallucinations    Erythromycin Other (comments)     halllucinations    Seroquel [Quetiapine] Other (comments)     hallucinations             Review of Systems:  Review of Systems   Constitutional: Negative for activity change, appetite change, chills, fatigue and fever. HENT: Negative for congestion and sore throat. Eyes: Negative for photophobia and visual disturbance. Respiratory: Negative for cough, shortness of breath and wheezing. Cardiovascular: Positive for chest pain. Negative for palpitations and leg swelling. Gastrointestinal: Negative for abdominal pain, diarrhea, nausea and vomiting. Belching   Endocrine: Negative for cold intolerance and heat intolerance. Musculoskeletal: Negative for gait problem and joint swelling. Skin: Negative for color change and rash. Neurological: Negative for dizziness and headaches. Vitals:     Visit Vitals  /70   Pulse 69   Temp 97.9 °F (36.6 °C)   Resp 14   Ht 4' 10\" (1.473 m)   Wt 107 kg (236 lb)   SpO2 98%   BMI 49.32 kg/m²       Physical Exam:   Physical Exam  Constitutional:       Appearance: She is well-developed. HENT:      Head: Normocephalic and atraumatic. Mouth/Throat:      Mouth: Mucous membranes are moist.      Pharynx: Oropharynx is clear. Eyes:      Conjunctiva/sclera: Conjunctivae normal.      Pupils: Pupils are equal, round, and reactive to light. Cardiovascular:      Rate and Rhythm: Normal rate and regular rhythm.       Heart sounds: No murmur heard.     Pulmonary:      Effort: No respiratory distress. Breath sounds: No stridor. No wheezing, rhonchi or rales. Abdominal:      General: There is no distension. Tenderness: There is no abdominal tenderness. There is no rebound. Musculoskeletal:      Cervical back: Normal range of motion and neck supple. Skin:     General: Skin is warm and dry. Neurological:      General: No focal deficit present. Mental Status: She is alert and oriented to person, place, and time. Psychiatric:         Mood and Affect: Mood normal.         Behavior: Behavior normal.         Data Review:   Recent Results (from the past 24 hour(s))   CBC WITH AUTOMATED DIFF    Collection Time: 09/22/21  9:15 PM   Result Value Ref Range    WBC 6.4 3.6 - 11.0 K/uL    RBC 4.50 3.80 - 5.20 M/uL    HGB 13.2 11.5 - 16.0 g/dL    HCT 41.3 35.0 - 47.0 %    MCV 91.8 80.0 - 99.0 FL    MCH 29.3 26.0 - 34.0 PG    MCHC 32.0 30.0 - 36.5 g/dL    RDW 14.0 11.5 - 14.5 %    PLATELET 642 318 - 680 K/uL    MPV 10.1 8.9 - 12.9 FL    NEUTROPHILS 51 32 - 75 %    LYMPHOCYTES 38 12 - 49 %    MONOCYTES 9 5 - 13 %    EOSINOPHILS 1 0 - 7 %    BASOPHILS 0 0 - 1 %    IMMATURE GRANULOCYTES 1 (H) 0.0 - 0.5 %    ABS. NEUTROPHILS 3.3 1.8 - 8.0 K/UL    ABS. LYMPHOCYTES 2.4 0.8 - 3.5 K/UL    ABS. MONOCYTES 0.6 0.0 - 1.0 K/UL    ABS. EOSINOPHILS 0.1 0.0 - 0.4 K/UL    ABS. BASOPHILS 0.0 0.0 - 0.1 K/UL    ABS. IMM.  GRANS. 0.1 (H) 0.00 - 0.04 K/UL    DF AUTOMATED     TROPONIN I    Collection Time: 09/22/21  9:15 PM   Result Value Ref Range    Troponin-I, Qt. <0.05 <8.78 ng/mL   METABOLIC PANEL, BASIC    Collection Time: 09/22/21  9:15 PM   Result Value Ref Range    Sodium 137 136 - 145 mmol/L    Potassium 4.1 3.5 - 5.1 mmol/L    Chloride 103 97 - 108 mmol/L    CO2 27 21 - 32 mmol/L    Anion gap 7 5 - 15 mmol/L    Glucose 155 (H) 65 - 100 mg/dL    BUN 32 (H) 6 - 20 mg/dL    Creatinine 0.96 0.55 - 1.02 mg/dL    BUN/Creatinine ratio 33 (H) 12 - 20      GFR est AA >60 >60 ml/min/1.73m2    GFR est non-AA 57 (L) >60 ml/min/1.73m2    Calcium 9.5 8.5 - 10.1 mg/dL   TROPONIN I    Collection Time: 09/22/21 11:30 PM   Result Value Ref Range    Troponin-I, Qt. <0.05 <0.05 ng/mL             Assessment and Plan :       (1) chest pain: troponin x2 negative, Atypical pain likely GERD: will give protonix and GI cocktail. adivsed lifedtyle modifications. Await cardiology to rule out cardiac causes.  Can likely go back to assisted living once seen by cards    (2) HTN: amlodioine, losartan    (3) depression with psychosis: abilify    DVT ppx: lovenox     DISPO: home once seen by cards, today assisting living    Signed By: Eli Rivera MD     September 23, 2021

## 2021-09-23 NOTE — DISCHARGE SUMMARY
Physician Discharge Summary     Patient ID:    Jennifer Keith  [id number]  76 y.o.  1945    Admit date: 9/22/2021    Discharge date : 9/23/2021    Chronic Diagnoses:    Problem List as of 9/23/2021 Date Reviewed: 3/25/2016        Codes Class Noted - Resolved    Acute chest pain ICD-10-CM: R07.9  ICD-9-CM: 786.50  9/22/2021 - Present        Acid indigestion ICD-10-CM: K30  ICD-9-CM: 536.8  3/24/2016 - Present        Asthma ICD-10-CM: J45.909  ICD-9-CM: 493.90  3/24/2016 - Present        Blurred vision, bilateral ICD-10-CM: H53.8  ICD-9-CM: 368.8  3/24/2016 - Present        Chest pain ICD-10-CM: R07.9  ICD-9-CM: 786.50  3/24/2016 - Present    Overview Signed 3/24/2016 10:12 AM by Marie Grullon LPN     sometimes             Constipation ICD-10-CM: K59.00  ICD-9-CM: 564.00  3/24/2016 - Present        Sleep apnea ICD-10-CM: G47.30  ICD-9-CM: 780.57  3/24/2016 - Present        Coughing ICD-10-CM: R05  ICD-9-CM: 786.2  3/24/2016 - Present        Type II diabetes mellitus ([facility] [address] ICD-10-CM: E11.9  ICD-9-CM: 250.00  3/24/2016 - Present        Dizziness ICD-10-CM: R42  ICD-9-CM: 780.4  3/24/2016 - Present        Fast heart beat ICD-10-CM: R00.0  ICD-9-CM: 785.0  3/24/2016 - Present    Overview Signed 3/24/2016 10:13 AM by Marie Grullon LPN     sometimes             Frequent headaches ICD-10-CM: R51.9  ICD-9-CM: 784.0  3/24/2016 - Present        Frequency of urination ICD-10-CM: R35.0  ICD-9-CM: 788.41  3/24/2016 - Present        Hemorrhoids ICD-10-CM: K64.9  ICD-9-CM: 455.6  3/24/2016 - Present        Hernia, hiatal ICD-10-CM: K44.9  ICD-9-CM: 553.3  3/24/2016 - Present    Overview Signed 3/24/2016 10:16 AM by Marie Grullon LPN     Never treated for it, was told they would not operate on her because of her diabetes.              High blood pressure ICD-10-CM: I10  ICD-9-CM: 401.9  3/24/2016 - Present        Multiple joint pain ICD-10-CM: M25.50  ICD-9-CM: 719.49  3/24/2016 - Present Joint swelling ICD-10-CM: M25.40  ICD-9-CM: 719.00  3/24/2016 - Present        Muscle pain ICD-10-CM: M79.10  ICD-9-CM: 729.1  3/24/2016 - Present        Muscle ache ICD-10-CM: M79.10  ICD-9-CM: 729.1  3/24/2016 - Present        Sinus congestion ICD-10-CM: R09.81  ICD-9-CM: 478.19  3/24/2016 - Present        Multiple stiff joints ICD-10-CM: M25.60  ICD-9-CM: 719.59  3/24/2016 - Present        Stomach pain ICD-10-CM: R10.9  ICD-9-CM: 536.8  3/24/2016 - Present    Overview Signed 3/24/2016 10:18 AM by Ester Deutsch LPN     Only sometimes             Stress ICD-10-CM: F43.9  ICD-9-CM: V62.89  3/24/2016 - Present        Anxiety ICD-10-CM: F41.9  ICD-9-CM: 300.00  3/24/2016 - Present        Sleeping difficulty ICD-10-CM: G47.9  ICD-9-CM: 780.50  3/24/2016 - Present          22    Final Diagnoses:   Acute chest pain [R07.9]  Chest pain [R07.9]    Reason for Hospitalization:  GERD  HTN  Depression  Chest pain s.t GERD      Hospital Course:      75F, coming from [address] [place] [address] to see cardiology     She has a h/o severe GERD      She states she presents with chest pain, likely under the breasts, 7/10, and it started after she had her meal last night, mostly under her breast, associated with belching and nausea, but no vomiting.      Cardiology was consulted and stress test was planned     She was planned for discharge after stress test    INSTRUCTIONS ON DISCHARGE     (1) new medication added to your regimen is PROTONIX 40mg daily     F/u with PCP and cardiology in 1 week. Discharge Medications:   Current Discharge Medication List      START taking these medications    Details   pantoprazole (Protonix) 40 mg tablet Take 1 Tablet by mouth daily. Qty: 30 Tablet, Refills: 2  Start date: 9/23/2021         CONTINUE these medications which have NOT CHANGED    Details   NOVOLIN 70/30 100 unit/mL (70-30) injection by SubCUTAneous route two (2) times a day.  Take 60 units in the AM and 54 units in the PM  Indications: PATIENT TAKES 64 IN THE AM AND 54 IN THE PM  Refills: 1      losartan (COZAAR) 100 mg tablet Take 1 Tab by mouth daily. hypertention  Refills: 1      SOTALOL HCL (SOTALOL PO) Take 80 mg by mouth two (2) times a day. INCRUSE ELLIPTA 62.5 mcg/actuation dsdv Take 1 Puff by mouth daily. Refills: 5      ARIPiprazole (ABILIFY) 2 mg tablet Take 1 Tab by mouth nightly. Take to block agitation  Refills: 1      gabapentin (NEURONTIN) 100 mg capsule Take 2 Caps by mouth three (3) times daily. Refills: 2      ibuprofen (MOTRIN) 600 mg tablet Take 1 Tab by mouth three (3) times daily. Take for arthritis  Refills: 1      levocetirizine (XYZAL) 5 mg tablet Take 1 Tab by mouth nightly. allergies  Refills: 1      montelukast (SINGULAIR) 10 mg tablet Take 1 Tab by mouth every evening. allergies  Refills: 1      NITROSTAT 0.4 mg SL tablet Take 1 Tab by mouth. Place one tablet under tongue PRN for chest pain, may repeat every 5 minutes for 2 more times then call 911  Refills: 1      Insulin Syringe-Needle U-100 1 mL 31 x 5/16\" syrg Refills: 1      tolterodine ER (DETROL LA) 4 mg ER capsule Take 1 Cap by mouth daily. Refills: 0      cyclobenzaprine (FLEXERIL) 10 mg tablet Take 1 Tab by mouth three (3) times daily as needed for Muscle Spasm(s). Qty: 20 Tab, Refills: 0               Follow up Care:    1. Magi Moon MD in 1-2 weeks. Please call to set up an appointment shortly after discharge. Diet:  cardiac    Disposition:  Assist living    Advanced Directive:   FULL    DNR      Discharge Exam:  Physical Exam  Constitutional:       Appearance: She is well-developed. HENT:      Head: Normocephalic and atraumatic.      Mouth/Throat:      Mouth: Mucous membranes are moist.      Pharynx: Oropharynx is clear. Eyes:      Conjunctiva/sclera: Conjunctivae normal.      Pupils: Pupils are equal, round, and reactive to light. Cardiovascular:      Rate and Rhythm: Normal rate and regular rhythm.      Heart sounds:  No murmur heard. Pulmonary:      Effort: No respiratory distress.      Breath sounds: No stridor. No wheezing, rhonchi or rales. Abdominal:      General: There is no distension.      Tenderness: There is no abdominal tenderness. There is no rebound. Musculoskeletal:      Cervical back: Normal range of motion and neck supple. Skin:     General: Skin is warm and dry. Neurological:      General: No focal deficit present.      Mental Status: She is alert and oriented to person, place, and time. Psychiatric: [place] Counter and Affect: Mood normal.         Behavior: Behavior normal.       CONSULTATIONS:cardiology    Significant Diagnostic Studies:     Radiologic Studies -   Results from Hospital Encounter encounter on 09/22/21    XR CHEST PA LAT    Narrative  Chest, 2 views. No comparisons. Impression  Mild-to-moderate enlargement of cardiopericardial silhouette. No  evidence of pulmonary edema, air space pneumonia, or pleural effusion. No  evidence of pneumothorax.      CT Results  (Last 48 hours)    None              Discharge time spent 35 minutes    Signed:  Pravin Jane MD  9/23/2021  3:33 PM

## 2021-09-23 NOTE — CONSULTS
Consult    NAME: Jd Christina   :  1945   MRN:  [id number]     Date/Time:  2021 1:04 PM    Patient PCP: Kari Watts MD  ________________________________________________________________________    This is an inpatient cardiology consultation requested by Dr. Jan Puga for chest pain. Assessment:     1. Atypical abdominal/midepigastric and left precordial chest pain which in setting of her cardiac risk factors is concerning for myocardial ischemic etiology. Patient while in hospital has ruled out for myocardial infarction. 2. Paroxysmal atrial fibrillation, currently in sinus rhythm  on Sotalol        Plan:     1. Low-dose aspirin and Lovenox  for empiric antianginal coverage. 2. Continue Protonix with topical nitrates for possible patient's GERD related esophageal spasm. 3. Continue sotalol for patient's paroxysmal atrial fibrillation. 4. Echocardiogram for LV function and valvular function. 5. Lexiscan stress Cardiolite scan to assess myocardial ischemia. 6. Thank you for allowing me to see this patient. []        High complexity decision making was performed        Subjective:   CHIEF COMPLAINT:     HISTORY OF PRESENT ILLNESS:     This is a 79-year-old -American female with a history of asthma, paroxysmal atrial fibrillation congestive heart failure with preserved LV systolic function, hypertension, diabetes mellitus, GERD, hiatal hernia, obesity and arthritis who for her cardiac problems is followed by Dr. Jalen Marcum in [place]. Patient yesterday presented to emergency room with complaints of dull indigestion type abdominal, midepigastric and left precordial chest discomfort of half an hour duration post meal.  Patient rated the discomfort as 7/10 belching and nausea. She denies any vomiting. Denies any palpitations, chest fluttering, shortness of breath or dizziness. In the emergency room patient ruled out for myocardial infarction by sets of troponin.   At the time of my evaluation patient complained of generalized abdominal discomfort and was tender to palpation under her left breast area. Past Medical History:   Diagnosis Date    Acid indigestion 3/24/2016    Anxiety 3/24/2016    Arthritis     Asthma     Asthma 3/24/2016    Blurred vision, bilateral 3/24/2016    Chest pain 3/24/2016    sometimes    Constipation 3/24/2016    Coughing 3/24/2016    Diabetes (HCC)     Dizziness 3/24/2016    Fast heart beat 3/24/2016    sometimes    Frequency of urination 3/24/2016    Frequent headaches 3/24/2016    Heart failure ([address]     Hemorrhoids 3/24/2016    Hernia, hiatal 3/24/2016    Never treated for it, was told they would not operate on her because of her diabetes.  High blood pressure 3/24/2016    Hypertension     Joint swelling 3/24/2016    Multiple joint pain 3/24/2016    Multiple stiff joints 3/24/2016    Muscle ache 3/24/2016    Muscle pain 3/24/2016    Sinus congestion 3/24/2016    Sleep apnea 3/24/2016    Sleeping difficulty 3/24/2016    Stomach pain 3/24/2016    Only sometimes    Stress 3/24/2016    Type II diabetes mellitus ([address] 3/24/2016      Past Surgical History:   Procedure Laterality Date    COLONOSCOPY      HX CATARACT REMOVAL      both eyes, cyst on left and floaters    HX  SECTION      HX CHOLECYSTECTOMY      HX CORONARY STENT PLACEMENT      HX GYN      HX HYSTERECTOMY      [place] CARDIAC SURG PROCEDURE UNLIST      [place]  DELIVERY ONLY       Allergies   Allergen Reactions    Azithromycin Other (comments)     hallucinations    Benadryl [Diphenhydramine Hcl] Other (comments)     hallucinations    Seroquel [Quetiapine] Other (comments)     Hallucinations.     Benadryl [Diphenhydramine Hcl] Other (comments)     hallucinations    Erythromycin Other (comments)     halllucinations    Seroquel [Quetiapine] Other (comments)     hallucinations      Meds:  See below  Social History     Tobacco Use    Smoking status: Former Smoker     Quit date: 3/24/1980     Years since quittin.5    Smokeless tobacco: Never Used   Substance Use Topics    Alcohol use: No      Family History   Problem Relation Age of Onset    Heart Disease Mother     Osteoporosis Mother     Heart Disease Father     Diabetes Sister     Cancer Maternal Aunt     Diabetes Maternal Aunt        REVIEW OF SYSTEMS:     []         Unable to obtain  ROS due to ---   [x]         Total of 12 systems reviewed as follows:    Constitutional: negative fever, negative chills, negative weight loss  Eyes:   negative visual changes  ENT:   negative sore throat, tongue or lip swelling  Respiratory:  negative cough, negative dyspnea  Cards: As per history of present illness  GI:   As per history of present illness  Genitourinary: negative for frequency, dysuria  Integument:  negative for rash   Hematologic:  negative for easy bruising and gum/nose bleeding  Musculoskel: negative for myalgias,  back pain  Neurological:  negative for headaches, dizziness, vertigo, weakness  Behavl/Psych: negative for feelings of anxiety, depression     Pertinent Positives include :    Objective:      Physical Exam:    Last 24hrs VS reviewed since prior progress note. Most recent are:    Visit Vitals  /71 (BP 1 Location: Left upper arm, BP Patient Position: Semi fowlers)   Pulse 84   Temp 98.1 °F (36.7 °C)   Resp 20   Ht 4' 10\" (1.473 m)   Wt 107 kg (236 lb)   SpO2 97%   BMI 49.32 kg/m²     No intake or output data in the 24 hours ending 21 1304     General Appearance: Well developed, well nourished, alert & oriented x 3,    no acute distress. Ears/Nose/Mouth/Throat: Pupils equal and round, Hearing grossly normal.  Neck: Supple. JVP within normal limits. Carotids good upstrokes, with no bruit. Chest: Lungs clear to auscultation bilaterally. Cardiovascular: Regular rate and rhythm, S1S2 normal, no murmur, rubs, gallops.   Abdomen: Soft, non-tender, bowel sounds are active. No organomegaly. Extremities: No edema bilaterally. Femoral pulses +2, Distal Pulses +1. Skin: Warm and dry. Neuro: CN II-XII grossly intact, Strength and sensation grossly intact. []         Post-cath site without hematoma, bruit, tenderness, or thrill. Distal pulses intact. Data:      Telemetry: Sinus rhythm      XR CHEST PA LAT   Final Result   Mild-to-moderate enlargement of cardiopericardial silhouette. No   evidence of pulmonary edema, air space pneumonia, or pleural effusion. No   evidence of pneumothorax. Prior to Admission medications    Medication Sig Start Date End Date Taking? Authorizing Provider   NOVOLIN 70/30 100 unit/mL (70-30) injection by SubCUTAneous route two (2) times a day. Take 60 units in the AM and 54 units in the PM  Indications: PATIENT TAKES 64 IN THE AM AND 54 IN THE PM 1/4/16  Yes Provider, Historical   losartan (COZAAR) 100 mg tablet Take 1 Tab by mouth daily. hypertention 1/29/16  Yes Provider, Historical   SOTALOL HCL (SOTALOL PO) Take 80 mg by mouth two (2) times a day. Yes Other, Phys, MD   INCRUSE ELLIPTA 62.5 mcg/actuation dsdv Take 1 Puff by mouth daily. 2/29/16   Provider, Historical   ARIPiprazole (ABILIFY) 2 mg tablet Take 1 Tab by mouth nightly. Take to block agitation 2/12/16   Provider, Historical   gabapentin (NEURONTIN) 100 mg capsule Take 2 Caps by mouth three (3) times daily. 1/9/16   Provider, Historical   ibuprofen (MOTRIN) 600 mg tablet Take 1 Tab by mouth three (3) times daily. Take for arthritis 1/20/16   Provider, Historical   levocetirizine (XYZAL) 5 mg tablet Take 1 Tab by mouth nightly. allergies 12/22/15   Provider, Historical   montelukast (SINGULAIR) 10 mg tablet Take 1 Tab by mouth every evening. allergies 12/22/15   Provider, Historical   NITROSTAT 0.4 mg SL tablet Take 1 Tab by mouth.  Place one tablet under tongue PRN for chest pain, may repeat every 5 minutes for 2 more times then call 911 2/9/16   Provider, Historical Insulin Syringe-Needle U-100 1 mL 31 x 5/16\" syrg  2/29/16   Provider, Historical   tolterodine ER (DETROL LA) 4 mg ER capsule Take 1 Cap by mouth daily. 1/18/16   Provider, Historical   cyclobenzaprine (FLEXERIL) 10 mg tablet Take 1 Tab by mouth three (3) times daily as needed for Muscle Spasm(s). 7/19/11   Randy Frias MD       Recent Results (from the past 24 hour(s))   CBC WITH AUTOMATED DIFF    Collection Time: 09/22/21  9:15 PM   Result Value Ref Range    WBC 6.4 3.6 - 11.0 K/uL    RBC 4.50 3.80 - 5.20 M/uL    HGB 13.2 11.5 - 16.0 g/dL    HCT 41.3 35.0 - 47.0 %    MCV 91.8 80.0 - 99.0 FL    MCH 29.3 26.0 - 34.0 PG    MCHC 32.0 30.0 - 36.5 g/dL    RDW 14.0 11.5 - 14.5 %    PLATELET 308 192 - 946 K/uL    MPV 10.1 8.9 - 12.9 FL    NEUTROPHILS 51 32 - 75 %    LYMPHOCYTES 38 12 - 49 %    MONOCYTES 9 5 - 13 %    EOSINOPHILS 1 0 - 7 %    BASOPHILS 0 0 - 1 %    IMMATURE GRANULOCYTES 1 (H) 0.0 - 0.5 %    ABS. NEUTROPHILS 3.3 1.8 - 8.0 K/UL    ABS. LYMPHOCYTES 2.4 0.8 - 3.5 K/UL    ABS. MONOCYTES 0.6 0.0 - 1.0 K/UL    ABS. EOSINOPHILS 0.1 0.0 - 0.4 K/UL    ABS. BASOPHILS 0.0 0.0 - 0.1 K/UL    ABS. IMM.  GRANS. 0.1 (H) 0.00 - 0.04 K/UL    DF AUTOMATED     TROPONIN I    Collection Time: 09/22/21  9:15 PM   Result Value Ref Range    Troponin-I, Qt. <0.05 <0.12 ng/mL   METABOLIC PANEL, BASIC    Collection Time: 09/22/21  9:15 PM   Result Value Ref Range    Sodium 137 136 - 145 mmol/L    Potassium 4.1 3.5 - 5.1 mmol/L    Chloride 103 97 - 108 mmol/L    CO2 27 21 - 32 mmol/L    Anion gap 7 5 - 15 mmol/L    Glucose 155 (H) 65 - 100 mg/dL    BUN 32 (H) 6 - 20 mg/dL    Creatinine 0.96 0.55 - 1.02 mg/dL    BUN/Creatinine ratio 33 (H) 12 - 20      GFR est AA >60 >60 ml/min/1.73m2    GFR est non-AA 57 (L) >60 ml/min/1.73m2    Calcium 9.5 8.5 - 10.1 mg/dL   TROPONIN I    Collection Time: 09/22/21 11:30 PM   Result Value Ref Range    Troponin-I, Qt. <0.05 <0.05 ng/mL        Echo:       Patient's  EKG and laboratory data were individually reviewed by me. Please send a copy of this dictation to above referring physician. Clara Humphrey MD    This dictation was done by Dragon computer voice recognition software. Occasionally grammatical, syntax and other interpretive errors escape final proof reading. Please feel free to call me for any clarification.

## 2021-09-24 ENCOUNTER — APPOINTMENT (OUTPATIENT)
Dept: NON INVASIVE DIAGNOSTICS | Age: 76
End: 2021-09-24
Attending: INTERNAL MEDICINE
Payer: MEDICARE

## 2021-09-24 ENCOUNTER — APPOINTMENT (OUTPATIENT)
Dept: NUCLEAR MEDICINE | Age: 76
End: 2021-09-24
Attending: INTERNAL MEDICINE
Payer: MEDICARE

## 2021-09-24 VITALS
DIASTOLIC BLOOD PRESSURE: 63 MMHG | OXYGEN SATURATION: 96 % | HEIGHT: 58 IN | SYSTOLIC BLOOD PRESSURE: 130 MMHG | HEART RATE: 88 BPM | RESPIRATION RATE: 18 BRPM | WEIGHT: 236 LBS | BODY MASS INDEX: 49.54 KG/M2 | TEMPERATURE: 97 F

## 2021-09-24 LAB
ECHO AO ASC DIAM: 2.9 CM
ECHO AO ROOT DIAM: 3 CM
ECHO AV AREA PEAK VELOCITY: 1.29 CM2
ECHO AV AREA/BSA PEAK VELOCITY: 0.7 CM2/M2
ECHO AV PEAK GRADIENT: 12 MMHG
ECHO AV PEAK VELOCITY: 175 CM/S
ECHO EST RA PRESSURE: 8 MMHG
ECHO LA AREA 4C: 14 CM2
ECHO LA MAJOR AXIS: 2.4 CM
ECHO LA MAJOR AXIS: 2.4 CM
ECHO LA MAJOR AXIS: 4.4 CM
ECHO LV E' SEPTAL VELOCITY: 5.46 CM/S
ECHO LV EDV A2C: 28.9 CM3
ECHO LV EDV A4C: 40 CM3
ECHO LV ESV A2C: 12.8 CM3
ECHO LV ESV A4C: 21 CM3
ECHO LV INTERNAL DIMENSION DIASTOLIC: 3.07 CM (ref 3.9–5.3)
ECHO LV INTERNAL DIMENSION SYSTOLIC: 2.34 CM
ECHO LV IVSD: 1.22 CM (ref 0.6–0.9)
ECHO LV MASS 2D: 111.2 G (ref 67–162)
ECHO LV MASS INDEX 2D: 57 G/M2 (ref 43–95)
ECHO LV POSTERIOR WALL DIASTOLIC: 1.16 CM (ref 0.6–0.9)
ECHO LVOT DIAM: 1.8 CM
ECHO LVOT PEAK GRADIENT: 3 MMHG
ECHO MV A VELOCITY: 92.5 CM/S
ECHO MV AREA PHT: 2.5 CM2
ECHO MV E DECELERATION TIME (DT): 113 MS
ECHO MV E VELOCITY: 51.2 CM/S
ECHO MV E/A RATIO: 0.55
ECHO MV E/E' SEPTAL: 9.38
ECHO MV MAX VELOCITY: 97.5 CM/S
ECHO MV MEAN GRADIENT: 1 MMHG
ECHO MV PEAK GRADIENT: 4 MMHG
ECHO MV PRESSURE HALF TIME (PHT): 88 MS
ECHO MV VTI: 21.9 CM
ECHO PV MAX VELOCITY: 89 CM/S
ECHO PV PEAK INSTANTANEOUS GRADIENT SYSTOLIC: 3 MMHG
ECHO PVEIN A DURATION: 85 MS
ECHO PVEIN A VELOCITY: 25.1 CM/S
ECHO RA AREA 4C: 11.34 CM2
ECHO RIGHT VENTRICULAR SYSTOLIC PRESSURE (RVSP): 19 MMHG
ECHO RV TAPSE: 1 CM (ref 1.5–2)
ECHO TV MAX VELOCITY: 166 CM/S
ECHO TV REGURGITANT PEAK GRADIENT: 11 MMHG
GLUCOSE BLD STRIP.AUTO-MCNC: 169 MG/DL (ref 65–117)
LA VOL DISK BP: 36 CM3 (ref 22–52)
MV DEC SLOPE: 2650 MM/S2
MV DEC SLOPE: 2650 MM/S2
PERFORMED BY, TECHID: ABNORMAL
STRESS BASELINE DIAS BP: 61 MMHG
STRESS BASELINE HR: 82 BPM
STRESS BASELINE SYS BP: 105 MMHG
STRESS PERCENT HR ACHIEVED: 85 %
STRESS POST PEAK HR: 123 BPM
STRESS ST DEPRESSION: 0 MM
STRESS ST ELEVATION: 0 MM
STRESS STAGE 1 DURATION: NORMAL MIN:SEC
STRESS STAGE 1 HR: 80 BPM
STRESS STAGE 2 BP: NORMAL MMHG
STRESS STAGE 2 DURATION: NORMAL MIN:SEC
STRESS STAGE 2 HR: 105 BPM
STRESS STAGE 3 BP: NORMAL MMHG
STRESS STAGE 3 DURATION: NORMAL MIN:SEC
STRESS STAGE 3 HR: 99 BPM
STRESS STAGE 4 DURATION: NORMAL MIN:SEC
STRESS STAGE 4 HR: 97 BPM
STRESS STAGE 5 BP: NORMAL MMHG
STRESS STAGE 5 HR: 98 BPM
STRESS TARGET HR: 145 BPM

## 2021-09-24 PROCEDURE — 82962 GLUCOSE BLOOD TEST: CPT

## 2021-09-24 PROCEDURE — 74011250636 HC RX REV CODE- 250/636

## 2021-09-24 PROCEDURE — 99218 HC RM OBSERVATION: CPT

## 2021-09-24 PROCEDURE — 74011250636 HC RX REV CODE- 250/636: Performed by: HOSPITALIST

## 2021-09-24 PROCEDURE — 78452 HT MUSCLE IMAGE SPECT MULT: CPT

## 2021-09-24 PROCEDURE — 96372 THER/PROPH/DIAG INJ SC/IM: CPT

## 2021-09-24 PROCEDURE — 74011250637 HC RX REV CODE- 250/637: Performed by: HOSPITALIST

## 2021-09-24 PROCEDURE — 74011250637 HC RX REV CODE- 250/637: Performed by: INTERNAL MEDICINE

## 2021-09-24 RX ADMIN — GABAPENTIN 200 MG: 100 CAPSULE ORAL at 17:51

## 2021-09-24 RX ADMIN — NITROGLYCERIN 1 INCH: 20 OINTMENT TOPICAL at 17:52

## 2021-09-24 RX ADMIN — ENOXAPARIN SODIUM 40 MG: 40 INJECTION SUBCUTANEOUS at 11:52

## 2021-09-24 RX ADMIN — CAPSAICIN: 0.25 CREAM TOPICAL at 11:57

## 2021-09-24 RX ADMIN — GABAPENTIN 200 MG: 100 CAPSULE ORAL at 11:53

## 2021-09-24 RX ADMIN — NITROGLYCERIN 1 INCH: 20 OINTMENT TOPICAL at 00:00

## 2021-09-24 RX ADMIN — CAPSAICIN: 0.25 CREAM TOPICAL at 17:45

## 2021-09-24 RX ADMIN — IBUPROFEN 600 MG: 600 TABLET ORAL at 11:52

## 2021-09-24 RX ADMIN — MONTELUKAST 10 MG: 10 TABLET, FILM COATED ORAL at 17:51

## 2021-09-24 RX ADMIN — CYCLOBENZAPRINE 10 MG: 10 TABLET, FILM COATED ORAL at 11:53

## 2021-09-24 RX ADMIN — NITROGLYCERIN 1 INCH: 20 OINTMENT TOPICAL at 11:53

## 2021-09-24 RX ADMIN — LOSARTAN POTASSIUM 100 MG: 50 TABLET, FILM COATED ORAL at 11:53

## 2021-09-24 RX ADMIN — SOTALOL HYDROCHLORIDE 80 MG: 80 TABLET ORAL at 11:55

## 2021-09-24 RX ADMIN — REGADENOSON 0.4 MG: 0.08 INJECTION, SOLUTION INTRAVENOUS at 09:13

## 2021-09-24 RX ADMIN — NITROGLYCERIN 1 INCH: 20 OINTMENT TOPICAL at 05:30

## 2021-09-24 RX ADMIN — IBUPROFEN 600 MG: 600 TABLET ORAL at 17:52

## 2021-09-24 RX ADMIN — ASPIRIN 81 MG: 81 TABLET, COATED ORAL at 11:53

## 2021-09-24 RX ADMIN — Medication 10 ML: at 06:23

## 2021-09-24 NOTE — PROGRESS NOTES
DC were given, IV removed, No distress. Pt verbalized understanding. Assisted to the daughter's car. DC home.

## 2021-09-24 NOTE — PROGRESS NOTES
Problem: Falls - Risk of  Goal: *Absence of Falls  Description: Document Miladis Cano Fall Risk and appropriate interventions in the flowsheet.   Outcome: Resolved/Met     Problem: Patient Education: Go to Patient Education Activity  Goal: Patient/Family Education  Outcome: Resolved/Met

## 2021-09-24 NOTE — DISCHARGE INSTRUCTIONS
Patient Education        Angina: Care Instructions  Your Care Instructions     You have a problem called angina. Angina happens when there is not enough blood flow to your heart muscle. Angina is a sign of coronary artery disease (CAD). CAD occurs when blood vessels that supply the heart become narrowed. Having CAD increases your risk of a heart attack. Chest pain or pressure is the most common symptom of angina. But some people have other symptoms, like:  · Pain, pressure, or a strange feeling in the back, neck, jaw, or upper belly, or in one or both shoulders or arms. · Shortness of breath. · Nausea or vomiting. · Lightheadedness or sudden weakness. · Fast or irregular heartbeat. Women are somewhat more likely than men to have angina symptoms like shortness of breath, nausea, and back or jaw pain. Angina can be dangerous. That's why it is important to pay attention to your symptoms. Know what is typical for you, learn how to control your symptoms, and understand when you need to get treatment. A change in your usual pattern of symptoms is an emergency. It may mean that you are having a heart attack. The doctor has checked you carefully, but problems can develop later. If you notice any problems or new symptoms, get medical treatment right away. Follow-up care is a key part of your treatment and safety. Be sure to make and go to all appointments, and call your doctor if you are having problems. It's also a good idea to know your test results and keep a list of the medicines you take. How can you care for yourself at home? Medicines    · If your doctor has given you nitroglycerin for angina symptoms, keep it with you at all times. If you have symptoms, sit down and rest, and take the first dose of nitroglycerin as directed. If your symptoms get worse or are not getting better within 5 minutes, call 911 right away. Stay on the phone.  The emergency  will give you further instructions.     · If your doctor advises it, take 1 low-dose aspirin a day to prevent heart attack.     · Be safe with medicines. Take your medicines exactly as prescribed. Call your doctor if you think you are having a problem with your medicine. You will get more details on the specific medicines your doctor prescribes. Lifestyle changes    · Do not smoke. If you need help quitting, talk to your doctor about stop-smoking programs and medicines. These can increase your chances of quitting for good.     · Eat a heart-healthy diet that is low in saturated fat and salt, and is high in fiber. Talk to your doctor or a dietitian about healthy eating.     · Stay at a healthy weight. Or lose weight if you need to. Activity    · Talk to your doctor about a level of activity that is safe for you.     · If an activity causes angina symptoms, stop and rest.   When should you call for help? Call 911 anytime you think you may need emergency care. For example, call if:    · You passed out (lost consciousness).     · You have symptoms of a heart attack. These may include:  ? Chest pain or pressure, or a strange feeling in the chest.  ? Sweating. ? Shortness of breath. ? Nausea or vomiting. ? Pain, pressure, or a strange feeling in the back, neck, jaw, or upper belly or in one or both shoulders or arms. ? Lightheadedness or sudden weakness. ? A fast or irregular heartbeat. After you call 911, the  may tell you to chew 1 adult-strength or 2 to 4 low-dose aspirin. Wait for an ambulance. Do not try to drive yourself.     · You have angina symptoms that do not go away with rest or are not getting better within 5 minutes after you take a dose of nitroglycerin. Call your doctor now if:    · Your angina symptoms seem worse but still follow your typical pattern. You can predict when symptoms will happen, but they may come on sooner, feel worse, or last longer.     · You feel dizzy or lightheaded, or you feel like you may faint.    Watch closely for changes in your health, and be sure to contact your doctor if you have any problems. Where can you learn more? Go to http://www.gray.com/  Enter H129 in the search box to learn more about \"Angina: Care Instructions. \"  Current as of: April 29, 2021               Content Version: 13.0  © 2006-2021 [employer]. Care instructions adapted under license by [employer] (which disclaims liability or warranty for this information). If you have questions about a medical condition or this instruction, always ask your healthcare professional. [address] any warranty or liability for your use of this information. Patient Education        Cardiac Rehabilitation: Care Instructions  Your Care Instructions     Cardiac rehabilitation is a program for people who have a heart problem, such as a heart attack, heart failure, or a heart valve disease. The program includes exercise, lifestyle changes, education, and emotional support. Cardiac rehab can help you improve the quality of your life through better overall health. It can help you lose weight and feel better about yourself. On your cardiac rehab team, you may have your doctor, a nurse specialist, a dietitian, and a physical therapist. They will design your cardiac rehab program specifically for you. You will learn how to reduce your risk for heart problems, how to manage stress, and how to eat a heart-healthy diet. By the end of the program, you will be ready to maintain a healthier lifestyle on your own. Follow-up care is a key part of your treatment and safety. Be sure to make and go to all appointments, and call your doctor if you are having problems. It's also a good idea to know your test results and keep a list of the medicines you take. How can you care for yourself at home? · Take your medicines exactly as prescribed.  Call your doctor if you think you are having a problem with your medicine. You will get more details on the specific medicines your doctor prescribes. · Weigh yourself every day if your doctor tells you to. Watch for sudden weight gain. Weigh yourself on the same scale with the same amount of clothing at the same time of day. · Plan your meals so that you are eating heart-healthy foods. ? Eat a variety of foods daily. Fresh fruits and vegetables and whole-grains are good choices. ? Limit your fat intake, especially saturated and trans fat. ? Limit salt (sodium). ? Increase fiber in your diet. ? Limit alcohol. · Learn how to take your pulse so that you can track your heart rate during exercise. · Always check with your doctor before you begin a new exercise program.  · Warm up before you exercise and cool down afterward for at least 15 minutes each. This will help your heart gradually prepare for and recover from exercise and avoid pushing your heart too hard. · Stop exercising if you have any unusual discomfort, such as chest pain. · Do not smoke. Smoking can make heart problems worse. If you need help quitting, talk to your doctor about stop-smoking programs and medicines. These can increase your chances of quitting for good. When should you call for help? Call 911 anytime you think you may need emergency care. For example, call if:    · You have severe trouble breathing.     · You cough up pink, foamy mucus and you have trouble breathing.     · You have symptoms of a heart attack. These may include:  ? Chest pain or pressure, or a strange feeling in the chest.  ? Sweating. ? Shortness of breath. ? Nausea or vomiting. ? Pain, pressure, or a strange feeling in the back, neck, jaw, or upper belly or in one or both shoulders or arms. ? Lightheadedness or sudden weakness. ? A fast or irregular heartbeat. After you call 911, the  may tell you to chew 1 adult-strength or 2 to 4 low-dose aspirin. Wait for an ambulance. Do not try to drive yourself.   · You have angina symptoms (such as chest pain or pressure) that do not go away with rest or are not getting better within 5 minutes after you take a dose of nitroglycerin.     · You have symptoms of a stroke. These may include:  ? Sudden numbness, tingling, weakness, or loss of movement in your face, arm, or leg, especially on only one side of your body. ? Sudden vision changes. ? Sudden trouble speaking. ? Sudden confusion or trouble understanding simple statements. ? Sudden problems with walking or balance. ? A sudden, severe headache that is different from past headaches.     · You passed out (lost consciousness). Call your doctor now or seek immediate medical care if:    · You have new or increased shortness of breath.     · You are dizzy or lightheaded, or you feel like you may faint.     · You gain weight suddenly, such as more than 2 to 3 pounds in a day or 5 pounds in a week. (Your doctor may suggest a different range of weight gain.)     · You have increased swelling in your legs, ankles, or feet. Watch closely for changes in your health, and be sure to contact your doctor if you have any problems. Where can you learn more? Go to http://www.gray.com/  Enter D709 in the search box to learn more about \"Cardiac Rehabilitation: Care Instructions. \"  Current as of: April 29, 2021               Content Version: 13.0  © 0344-6159 [employer]. Care instructions adapted under license by [employer] (which disclaims liability or warranty for this information). If you have questions about a medical condition or this instruction, always ask your healthcare professional. [place] [address] any warranty or liability for your use of this information.          Patient Education        Taking Aspirin and Other Antiplatelets Safely: Care Instructions  Your Care Instructions     Aspirin and other antiplatelet medicines help prevent blood clots from forming. They can help some people lower their risk of a heart attack or stroke. But these medicines can also make you more likely to bleed. That's why it's important to talk to your doctor before you start taking aspirin every day. It's not right for everyone. And if you and your doctor decide these medicines are right for you, learn how to take them safely. If you take aspirin, be sure you know how to take it. Your doctor can tell you what dose to take and how often to take it. One low-dose aspirin is 81 milligrams (mg). But the dose for daily aspirin can range from 81 mg to 325 mg. If you take another antiplatelet, take it as prescribed. Follow-up care is a key part of your treatment and safety. Be sure to make and go to all appointments, and call your doctor if you are having problems. It's also a good idea to know your test results and keep a list of the medicines you take. How can you care for yourself at home? · Before you start to take daily aspirin or some other antiplatelet, tell your doctor all the medicines, vitamins, herbal products, and supplements you take. · Tell your doctors, dentist, and pharmacist that you take an antiplatelet. · Take your medicine as your doctor directs. Make sure that you understand exactly what your doctor wants you to do. If another doctor says to stop taking the medicine for any reason, talk to the doctor who prescribed it before you stop. · Take your medicine at the same time every day. · Do not chew or crush the coated or time-release forms of your medicine. · If you miss a dose, don't take an extra dose to make up for it. · Ask your doctor whether you can drink alcohol. And ask how much you can drink. When you take an antiplatelet, drinking too much raises your risk for liver damage and stomach bleeding. · If you are pregnant, are breastfeeding, or plan to become pregnant, talk to your doctor about what medicines are safe.   · Talk with your doctor before you take a pain medicine. Many pain medicines have aspirin. Too much aspirin can be harmful. · Wear medical alert jewelry. This lets others know that you take an antiplatelet. You can buy it at most drugstores. · Try to avoid injuries that might make you bleed. For example, be careful when you exercise and when you play sports. Make your home safe to reduce your risk of falling. When should you call for help? Call 911  anytime you think you may need emergency care. For example, call if:    · You have a sudden, severe headache that is different from past headaches. Call your doctor now or seek immediate medical care if:    · You have any abnormal bleeding, such as:  ? A nosebleed that you can't easily stop. ? Bloody or black stools, or rectal bleeding. ? Bloody or pink urine.     · You feel dizzy or lightheaded or feel like you may faint. Watch closely for changes in your health, and be sure to contact your doctor if you have any problems. Where can you learn more? Go to http://www.gray.com/  Enter A768 in the search box to learn more about \"Taking Aspirin and Other Antiplatelets Safely: Care Instructions. \"  Current as of: April 29, 2021               Content Version: 13.0  © 8426-4200 [employer]. Care instructions adapted under license by [employer] (which disclaims liability or warranty for this information). If you have questions about a medical condition or this instruction, always ask your healthcare professional. [place] [address] any warranty or liability for your use of this information. INSTRUCTIONS ON DISCHARGE     (1) new medication added to your regimen is PROTONIX 40mg daily     F/u with PCP and cardiology in 1 week.

## 2021-09-24 NOTE — DISCHARGE SUMMARY
Physician Discharge Summary     Patient ID:    Ney Borja  [id number]  76 y.o.  1945    Admit date: 9/22/2021    Discharge date : 9/24/2021    Chronic Diagnoses:    Problem List as of 9/24/2021 Date Reviewed: 3/25/2016        Codes Class Noted - Resolved    Acute chest pain ICD-10-CM: R07.9  ICD-9-CM: 786.50  9/22/2021 - Present        Acid indigestion ICD-10-CM: K30  ICD-9-CM: 536.8  3/24/2016 - Present        Asthma ICD-10-CM: J45.909  ICD-9-CM: 493.90  3/24/2016 - Present        Blurred vision, bilateral ICD-10-CM: H53.8  ICD-9-CM: 368.8  3/24/2016 - Present        Chest pain ICD-10-CM: R07.9  ICD-9-CM: 786.50  3/24/2016 - Present    Overview Signed 3/24/2016 10:12 AM by Macie Piper LPN     sometimes             Constipation ICD-10-CM: K59.00  ICD-9-CM: 564.00  3/24/2016 - Present        Sleep apnea ICD-10-CM: G47.30  ICD-9-CM: 780.57  3/24/2016 - Present        Coughing ICD-10-CM: R05  ICD-9-CM: 786.2  3/24/2016 - Present        Type II diabetes mellitus ([facility] [address] ICD-10-CM: E11.9  ICD-9-CM: 250.00  3/24/2016 - Present        Dizziness ICD-10-CM: R42  ICD-9-CM: 780.4  3/24/2016 - Present        Fast heart beat ICD-10-CM: R00.0  ICD-9-CM: 785.0  3/24/2016 - Present    Overview Signed 3/24/2016 10:13 AM by Macie Piper LPN     sometimes             Frequent headaches ICD-10-CM: R51.9  ICD-9-CM: 784.0  3/24/2016 - Present        Frequency of urination ICD-10-CM: R35.0  ICD-9-CM: 788.41  3/24/2016 - Present        Hemorrhoids ICD-10-CM: K64.9  ICD-9-CM: 455.6  3/24/2016 - Present        Hernia, hiatal ICD-10-CM: K44.9  ICD-9-CM: 553.3  3/24/2016 - Present    Overview Signed 3/24/2016 10:16 AM by Macie Piper, LPN     Never treated for it, was told they would not operate on her because of her diabetes.              High blood pressure ICD-10-CM: I10  ICD-9-CM: 401.9  3/24/2016 - Present        Multiple joint pain ICD-10-CM: M25.50  ICD-9-CM: 719.49  3/24/2016 - Present Joint swelling ICD-10-CM: M25.40  ICD-9-CM: 719.00  3/24/2016 - Present        Muscle pain ICD-10-CM: M79.10  ICD-9-CM: 729.1  3/24/2016 - Present        Muscle ache ICD-10-CM: M79.10  ICD-9-CM: 729.1  3/24/2016 - Present        Sinus congestion ICD-10-CM: R09.81  ICD-9-CM: 478.19  3/24/2016 - Present        Multiple stiff joints ICD-10-CM: M25.60  ICD-9-CM: 719.59  3/24/2016 - Present        Stomach pain ICD-10-CM: R10.9  ICD-9-CM: 536.8  3/24/2016 - Present    Overview Signed 3/24/2016 10:18 AM by Yashira Mckeon LPN     Only sometimes             Stress ICD-10-CM: F43.9  ICD-9-CM: V62.89  3/24/2016 - Present        Anxiety ICD-10-CM: F41.9  ICD-9-CM: 300.00  3/24/2016 - Present        Sleeping difficulty ICD-10-CM: G47.9  ICD-9-CM: 780.50  3/24/2016 - Present          22    Final Diagnoses:   Acute chest pain [R07.9]  Chest pain [R07.9]    Reason for Hospitalization:  GERD  HTN  Depression  Chest pain s.t GERD      Hospital Course:      75F, coming from [address] [place] [address] to see cardiology     She has a h/o severe GERD      She states she presents with chest pain, likely under the breasts, 7/10, and it started after she had her meal last night, mostly under her breast, associated with belching and nausea, but no vomiting.      Cardiology was consulted and stress test was planned     She was planned for discharge after stress test    INSTRUCTIONS ON DISCHARGE     (1) new medication added to your regimen is PROTONIX 40mg daily     F/u with PCP and cardiology in 1 week. Discharge Medications:   Current Discharge Medication List      START taking these medications    Details   pantoprazole (Protonix) 40 mg tablet Take 1 Tablet by mouth daily. Qty: 30 Tablet, Refills: 2  Start date: 9/23/2021         CONTINUE these medications which have NOT CHANGED    Details   NOVOLIN 70/30 100 unit/mL (70-30) injection by SubCUTAneous route two (2) times a day.  Take 60 units in the AM and 54 units in the PM  Indications: PATIENT TAKES 64 IN THE AM AND 54 IN THE PM  Refills: 1      losartan (COZAAR) 100 mg tablet Take 1 Tab by mouth daily. hypertention  Refills: 1      SOTALOL HCL (SOTALOL PO) Take 80 mg by mouth two (2) times a day. INCRUSE ELLIPTA 62.5 mcg/actuation dsdv Take 1 Puff by mouth daily. Refills: 5      ARIPiprazole (ABILIFY) 2 mg tablet Take 1 Tab by mouth nightly. Take to block agitation  Refills: 1      gabapentin (NEURONTIN) 100 mg capsule Take 2 Caps by mouth three (3) times daily. Refills: 2      ibuprofen (MOTRIN) 600 mg tablet Take 1 Tab by mouth three (3) times daily. Take for arthritis  Refills: 1      levocetirizine (XYZAL) 5 mg tablet Take 1 Tab by mouth nightly. allergies  Refills: 1      montelukast (SINGULAIR) 10 mg tablet Take 1 Tab by mouth every evening. allergies  Refills: 1      NITROSTAT 0.4 mg SL tablet Take 1 Tab by mouth. Place one tablet under tongue PRN for chest pain, may repeat every 5 minutes for 2 more times then call 911  Refills: 1      Insulin Syringe-Needle U-100 1 mL 31 x 5/16\" syrg Refills: 1      tolterodine ER (DETROL LA) 4 mg ER capsule Take 1 Cap by mouth daily. Refills: 0      cyclobenzaprine (FLEXERIL) 10 mg tablet Take 1 Tab by mouth three (3) times daily as needed for Muscle Spasm(s). Qty: 20 Tab, Refills: 0               Follow up Care:    1. Read MD Navjot in 1-2 weeks. Please call to set up an appointment shortly after discharge. Diet:  cardiac    Disposition:  Assist living    Advanced Directive:   FULL    DNR      Discharge Exam:  Physical Exam  Constitutional:       Appearance: She is well-developed. HENT:      Head: Normocephalic and atraumatic.      Mouth/Throat:      Mouth: Mucous membranes are moist.      Pharynx: Oropharynx is clear. Eyes:      Conjunctiva/sclera: Conjunctivae normal.      Pupils: Pupils are equal, round, and reactive to light. Cardiovascular:      Rate and Rhythm: Normal rate and regular rhythm.      Heart sounds:  No murmur heard. Pulmonary:      Effort: No respiratory distress.      Breath sounds: No stridor. No wheezing, rhonchi or rales. Abdominal:      General: There is no distension.      Tenderness: There is no abdominal tenderness. There is no rebound. Musculoskeletal:      Cervical back: Normal range of motion and neck supple. Skin:     General: Skin is warm and dry. Neurological:      General: No focal deficit present.      Mental Status: She is alert and oriented to person, place, and time. Psychiatric: Donna Roseann and Affect: Mood normal.         Behavior: Behavior normal.       CONSULTATIONS:cardiology    Significant Diagnostic Studies:     Radiologic Studies -   Results from Hospital Encounter encounter on 09/22/21    XR CHEST PA LAT    Narrative  Chest, 2 views. No comparisons. Impression  Mild-to-moderate enlargement of cardiopericardial silhouette. No  evidence of pulmonary edema, air space pneumonia, or pleural effusion. No  evidence of pneumothorax.      CT Results  (Last 48 hours)    None              Discharge time spent 35 minutes    Signed:  Laura Buckley MD  9/24/2021  3:33 PM

## 2021-09-24 NOTE — PROGRESS NOTES
PROGRESS NOTE - CARDIOLOGY    CHIEF COMPLAINT:  F/u chest pain    HISTORY OF PRESENT ILLNESS / OVERNIGHT EVENTS  No events overnight. Underwent stress testing today. Chest pain continues. On my interview reproducible on palpitation under left breast. Worse with movement. No palpitations. Despite h/o PAF (on sotalol) remains in SR. BP at goal. No N/V/headaches.     MEDICATIONS/PMHx    Current Facility-Administered Medications:     sodium chloride (NS) flush 5-40 mL, 5-40 mL, IntraVENous, Q8H, Nora Wang MD, 10 mL at 09/24/21 8285    sodium chloride (NS) flush 5-40 mL, 5-40 mL, IntraVENous, PRN, Manolo Wray MD    acetaminophen (TYLENOL) tablet 650 mg, 650 mg, Oral, Q6H PRN **OR** acetaminophen (TYLENOL) suppository 650 mg, 650 mg, Rectal, Q6H PRN, Manolo Wray MD    polyethylene glycol (MIRALAX) packet 17 g, 17 g, Oral, DAILY PRN, Manolo Wray MD    ondansetron (ZOFRAN ODT) tablet 4 mg, 4 mg, Oral, Q8H PRN **OR** ondansetron (ZOFRAN) injection 4 mg, 4 mg, IntraVENous, Q6H PRN, Manolo Wray MD    enoxaparin (LOVENOX) injection 40 mg, 40 mg, SubCUTAneous, DAILY, Dickson Wang MD, 40 mg at 09/24/21 1152    ARIPiprazole (ABILIFY) tablet 2 mg, 2 mg, Oral, QHS, Manolo Wray MD, 2 mg at 09/23/21 2159    cyclobenzaprine (FLEXERIL) tablet 10 mg, 10 mg, Oral, TID PRN, Manolo Wray MD, 10 mg at 09/24/21 1153    gabapentin (NEURONTIN) capsule 200 mg, 200 mg, Oral, TID, Manolo Wray MD, 200 mg at 09/24/21 1153    ibuprofen (MOTRIN) tablet 600 mg, 600 mg, Oral, TID, Manolo Wray MD, 600 mg at 09/24/21 1152    loratadine (CLARITIN) tablet 5 mg, 5 mg, Oral, QHS, Nora Wang MD, 5 mg at 09/23/21 2159    tiotropium bromide (SPIRIVA RESPIMAT) 2.5 mcg [place] Phlegm, 2 [place], Inhalation, DAILY, Manolo Wray MD, 2 Puff at 09/23/21 1308    losartan (COZAAR) tablet 100 mg, 100 mg, Oral, DAILY, Manolo Wray MD, 100 mg at 09/24/21 1153    montelukast (SINGULAIR) tablet 10 mg, 10 mg, Oral, QPM, Nicki Andujar MD, 10 mg at 09/23/21 1830    nitroglycerin (NITROSTAT) tablet 0.4 mg, 0.4 mg, SubLINGual, PRN, Nicki Andujar MD    insulin NPH/insulin regular (NOVOLIN 70/30, HUMULIN 70/30) injection 60 Units, 60 Units, SubCUTAneous, BID, Kourtney Wang MD    sotaloL (BETAPACE) tablet 80 mg, 80 mg, Oral, Q12H, Nicki Andujar MD, 80 mg at 09/24/21 1155    trospium (SANCTURA) tablet 20 mg, 20 mg, Oral, BID, Nicki Andujar MD, 20 mg at 09/23/21 1111    capsicum oleoresin 0.025 % cream, , Topical, TID, Nicki Andujar MD, Given at 09/24/21 1157    aspirin delayed-release tablet 81 mg, 81 mg, Oral, DAILY, Tanya Rivas MD, 81 mg at 09/24/21 1153    nitroglycerin (NITROBID) 2 % ointment 1 Inch, 1 Inch, Topical, Q6H, Tanya Rivas MD, 1 Inch at 09/24/21 1153    PHYSICAL EXAMINATION  Visit Vitals  /73 (BP Patient Position: Semi fowlers; At rest)   Pulse 83   Temp 97.8 °F (36.6 °C)   Resp 18   Ht 4' 10\" (1.473 m)   Wt 107 kg (236 lb)   SpO2 97%   BMI 49.32 kg/m²     General: no acute distress  HEENT/neck: no JVD, no masses, trachea midline. Pulmonary: clear to ausculation bilaterally with good air movement  Cardiovascular: regular rate, regular rhythm; no murmurs, rubs or gallops. Normal point of maximal impulse. No peripheral edema. Chest tender on palpation. GI: soft, nontender, no hepatosplenomegaly  Hematology/Oncology: no lymphadenopathy; no bruising  Skin: warm and dry, no rashes, lesions, or ulcer  Musculoskeletal: Moving all four extremities. Normal gait and station.       MEDICAL DECISION MAKING  Recent Results (from the past 24 hour(s))   GLUCOSE, POC    Collection Time: 09/23/21  9:53 PM   Result Value Ref Range    Glucose (POC) 134 (H) 65 - 117 mg/dL    Performed by Virginia Correa vd STRESS TEST    Collection Time: 09/24/21 10:35 AM   Result Value Ref Range    Target  bpm    Baseline HR 82 bpm    Baseline  mmHg    Percent HR 85 %    Post peak  bpm    Stress Base Diastolic BP 61 mmHg    Stress Stage 1 Duration 1min min:sec    Stress Stage 1 HR 80 bpm    Stress Stage 2 Duration 2min min:sec    Stress Stage 2  bpm    Stress Stage 2 /59 mmHg    Stress Stage 3 Duration 3min min:sec    Stress Stage 3 HR 99 bpm    Stress Stage 3 /64 mmHg    Stress Stage 4 Duration 4min min:sec    Stress Stage 4 HR 97 bpm    Stress Stage 5 HR 98 bpm    Stress Stage 5 /65 mmHg    ST Elevation (mm) 0 mm    ST Depression (mm) 0 mm   GLUCOSE, POC    Collection Time: 09/24/21 10:37 AM   Result Value Ref Range    Glucose (POC) 169 (H) 65 - 117 mg/dL    Performed by DIETER Baez nuclear stress independently reviewed, my impression:  No ischemia. Normal study. Telemetry independently reviewed, my impression: NSR    IMPRESSION/PLAN  Chest pain -- due to GERD. Musculoskeletal pain on possible. Paroxysmal atrial fibrillation  Obesity due to excessive caloric intake  Hypertension, controlled    Patient given reassurance re: chest pain. Can f/u with local cardiologist. Do recommend aggressive risk factor modification. Would add statin given ACC/AHA guidelines for patient's with diabetes. For PAF, continue sotalol. Defer to regular cardiologist re: utility of anticoagulation for stroke preventative benefit. Her [id number] score is high. Patient made aware and verbalized understanding of bleeding/stroke risk    For obesity, recommend < 1800 calorie diet per day. For hypertension, continue current BP meds. Patient acceptable for disposition.

## 2022-03-20 PROBLEM — R07.9 ACUTE CHEST PAIN: Status: ACTIVE | Noted: 2021-09-22

## 2023-07-17 ENCOUNTER — APPOINTMENT (OUTPATIENT)
Facility: HOSPITAL | Age: 78
DRG: 247 | End: 2023-07-17
Payer: MEDICARE

## 2023-07-17 ENCOUNTER — HOSPITAL ENCOUNTER (INPATIENT)
Facility: HOSPITAL | Age: 78
LOS: 4 days | Discharge: HOME | DRG: 247 | End: 2023-07-21
Attending: EMERGENCY MEDICINE | Admitting: FAMILY MEDICINE
Payer: MEDICARE

## 2023-07-17 ENCOUNTER — HOSPITAL ENCOUNTER (OUTPATIENT)
Facility: HOSPITAL | Age: 78
Discharge: HOME | DRG: 247 | End: 2023-07-20
Payer: MEDICARE

## 2023-07-17 VITALS
SYSTOLIC BLOOD PRESSURE: 133 MMHG | BODY MASS INDEX: 51.3 KG/M2 | DIASTOLIC BLOOD PRESSURE: 70 MMHG | RESPIRATION RATE: 20 BRPM | HEART RATE: 85 BPM | TEMPERATURE: 97.5 F | OXYGEN SATURATION: 97 % | WEIGHT: 244.4 LBS | HEIGHT: 58 IN

## 2023-07-17 DIAGNOSIS — I25.119 CORONARY ARTERY DISEASE INVOLVING NATIVE HEART WITH ANGINA PECTORIS, UNSPECIFIED VESSEL OR LESION TYPE (HCC): ICD-10-CM

## 2023-07-17 DIAGNOSIS — R07.9 CHEST PAIN: ICD-10-CM

## 2023-07-17 DIAGNOSIS — I49.9 CARDIAC ARRHYTHMIA, UNSPECIFIED CARDIAC ARRHYTHMIA TYPE: Primary | ICD-10-CM

## 2023-07-17 PROBLEM — I45.9 HEART BLOCK: Status: ACTIVE | Noted: 2023-07-17

## 2023-07-17 LAB
ALBUMIN SERPL-MCNC: 3.7 G/DL (ref 3.5–5)
ALBUMIN/GLOB SERPL: 0.8 (ref 1.1–2.2)
ALP SERPL-CCNC: 94 U/L (ref 45–117)
ALT SERPL-CCNC: 20 U/L (ref 12–78)
ANION GAP SERPL CALC-SCNC: 4 MMOL/L (ref 5–15)
AST SERPL W P-5'-P-CCNC: 15 U/L (ref 15–37)
BASOPHILS # BLD: 0 K/UL (ref 0–0.1)
BASOPHILS NFR BLD: 0 % (ref 0–1)
BILIRUB SERPL-MCNC: 0.5 MG/DL (ref 0.2–1)
BNP SERPL-MCNC: 100 PG/ML
BUN SERPL-MCNC: 23 MG/DL (ref 6–20)
BUN/CREAT SERPL: 21 (ref 12–20)
CA-I BLD-MCNC: 9.4 MG/DL (ref 8.5–10.1)
CHLORIDE SERPL-SCNC: 107 MMOL/L (ref 97–108)
CO2 SERPL-SCNC: 28 MMOL/L (ref 21–32)
CREAT SERPL-MCNC: 1.09 MG/DL (ref 0.55–1.02)
DIFFERENTIAL METHOD BLD: ABNORMAL
EOSINOPHIL # BLD: 0.1 K/UL (ref 0–0.4)
EOSINOPHIL NFR BLD: 1 % (ref 0–7)
ERYTHROCYTE [DISTWIDTH] IN BLOOD BY AUTOMATED COUNT: 13.2 % (ref 11.5–14.5)
GLOBULIN SER CALC-MCNC: 4.4 G/DL (ref 2–4)
GLUCOSE BLD STRIP.AUTO-MCNC: 187 MG/DL (ref 65–100)
GLUCOSE SERPL-MCNC: 155 MG/DL (ref 65–100)
HCT VFR BLD AUTO: 43.4 % (ref 35–47)
HGB BLD-MCNC: 13.6 G/DL (ref 11.5–16)
IMM GRANULOCYTES # BLD AUTO: 0.1 K/UL (ref 0–0.04)
IMM GRANULOCYTES NFR BLD AUTO: 2 % (ref 0–0.5)
LYMPHOCYTES # BLD: 2.2 K/UL (ref 0.8–3.5)
LYMPHOCYTES NFR BLD: 31 % (ref 12–49)
MAGNESIUM SERPL-MCNC: 2.3 MG/DL (ref 1.6–2.4)
MCH RBC QN AUTO: 29 PG (ref 26–34)
MCHC RBC AUTO-ENTMCNC: 31.3 G/DL (ref 30–36.5)
MCV RBC AUTO: 92.5 FL (ref 80–99)
MONOCYTES # BLD: 0.5 K/UL (ref 0–1)
MONOCYTES NFR BLD: 7 % (ref 5–13)
NEUTS SEG # BLD: 4.3 K/UL (ref 1.8–8)
NEUTS SEG NFR BLD: 59 % (ref 32–75)
NRBC # BLD: 0 K/UL (ref 0–0.01)
NRBC BLD-RTO: 0 PER 100 WBC
PERFORMED BY:: ABNORMAL
PLATELET # BLD AUTO: 190 K/UL (ref 150–400)
PMV BLD AUTO: 10.6 FL (ref 8.9–12.9)
POTASSIUM SERPL-SCNC: 4.3 MMOL/L (ref 3.5–5.1)
PROT SERPL-MCNC: 8.1 G/DL (ref 6.4–8.2)
RBC # BLD AUTO: 4.69 M/UL (ref 3.8–5.2)
SODIUM SERPL-SCNC: 139 MMOL/L (ref 136–145)
TROPONIN I SERPL HS-MCNC: 6 NG/L (ref 0–51)
TROPONIN I SERPL HS-MCNC: 7 NG/L (ref 0–51)
TSH SERPL DL<=0.05 MIU/L-ACNC: 1.58 UIU/ML (ref 0.36–3.74)
WBC # BLD AUTO: 7.3 K/UL (ref 3.6–11)

## 2023-07-17 PROCEDURE — 83036 HEMOGLOBIN GLYCOSYLATED A1C: CPT

## 2023-07-17 PROCEDURE — 93005 ELECTROCARDIOGRAM TRACING: CPT | Performed by: EMERGENCY MEDICINE

## 2023-07-17 PROCEDURE — 84443 ASSAY THYROID STIM HORMONE: CPT

## 2023-07-17 PROCEDURE — 84484 ASSAY OF TROPONIN QUANT: CPT

## 2023-07-17 PROCEDURE — 82962 GLUCOSE BLOOD TEST: CPT

## 2023-07-17 PROCEDURE — 71045 X-RAY EXAM CHEST 1 VIEW: CPT

## 2023-07-17 PROCEDURE — 99285 EMERGENCY DEPT VISIT HI MDM: CPT

## 2023-07-17 PROCEDURE — 80053 COMPREHEN METABOLIC PANEL: CPT

## 2023-07-17 PROCEDURE — 83735 ASSAY OF MAGNESIUM: CPT

## 2023-07-17 PROCEDURE — 94761 N-INVAS EAR/PLS OXIMETRY MLT: CPT

## 2023-07-17 PROCEDURE — 6360000002 HC RX W HCPCS: Performed by: FAMILY MEDICINE

## 2023-07-17 PROCEDURE — 2580000003 HC RX 258: Performed by: FAMILY MEDICINE

## 2023-07-17 PROCEDURE — 83880 ASSAY OF NATRIURETIC PEPTIDE: CPT

## 2023-07-17 PROCEDURE — 36415 COLL VENOUS BLD VENIPUNCTURE: CPT

## 2023-07-17 PROCEDURE — 85025 COMPLETE CBC W/AUTO DIFF WBC: CPT

## 2023-07-17 PROCEDURE — 6370000000 HC RX 637 (ALT 250 FOR IP): Performed by: FAMILY MEDICINE

## 2023-07-17 PROCEDURE — 93005 ELECTROCARDIOGRAM TRACING: CPT | Performed by: INTERNAL MEDICINE

## 2023-07-17 PROCEDURE — 2060000000 HC ICU INTERMEDIATE R&B

## 2023-07-17 RX ORDER — LOSARTAN POTASSIUM 50 MG/1
100 TABLET ORAL DAILY
Status: DISCONTINUED | OUTPATIENT
Start: 2023-07-17 | End: 2023-07-21 | Stop reason: HOSPADM

## 2023-07-17 RX ORDER — SODIUM CHLORIDE 0.9 % (FLUSH) 0.9 %
5-40 SYRINGE (ML) INJECTION EVERY 12 HOURS SCHEDULED
Status: DISCONTINUED | OUTPATIENT
Start: 2023-07-17 | End: 2023-07-21 | Stop reason: HOSPADM

## 2023-07-17 RX ORDER — GABAPENTIN 100 MG/1
200 CAPSULE ORAL 3 TIMES DAILY
Status: DISCONTINUED | OUTPATIENT
Start: 2023-07-17 | End: 2023-07-18

## 2023-07-17 RX ORDER — DEXTROSE MONOHYDRATE 100 MG/ML
INJECTION, SOLUTION INTRAVENOUS CONTINUOUS PRN
Status: DISCONTINUED | OUTPATIENT
Start: 2023-07-17 | End: 2023-07-21 | Stop reason: HOSPADM

## 2023-07-17 RX ORDER — SODIUM CHLORIDE 0.9 % (FLUSH) 0.9 %
5-40 SYRINGE (ML) INJECTION PRN
Status: DISCONTINUED | OUTPATIENT
Start: 2023-07-17 | End: 2023-07-21 | Stop reason: HOSPADM

## 2023-07-17 RX ORDER — INSULIN LISPRO 100 [IU]/ML
0-4 INJECTION, SOLUTION INTRAVENOUS; SUBCUTANEOUS NIGHTLY
Status: DISCONTINUED | OUTPATIENT
Start: 2023-07-17 | End: 2023-07-21 | Stop reason: HOSPADM

## 2023-07-17 RX ORDER — NITROGLYCERIN 0.4 MG/1
0.4 TABLET SUBLINGUAL EVERY 5 MIN PRN
Status: DISCONTINUED | OUTPATIENT
Start: 2023-07-17 | End: 2023-07-21 | Stop reason: HOSPADM

## 2023-07-17 RX ORDER — ONDANSETRON 2 MG/ML
4 INJECTION INTRAMUSCULAR; INTRAVENOUS EVERY 6 HOURS PRN
Status: DISCONTINUED | OUTPATIENT
Start: 2023-07-17 | End: 2023-07-21 | Stop reason: HOSPADM

## 2023-07-17 RX ORDER — ASPIRIN 81 MG/1
81 TABLET ORAL DAILY
COMMUNITY

## 2023-07-17 RX ORDER — CYCLOBENZAPRINE HCL 10 MG
10 TABLET ORAL 3 TIMES DAILY PRN
Status: DISCONTINUED | OUTPATIENT
Start: 2023-07-17 | End: 2023-07-21 | Stop reason: HOSPADM

## 2023-07-17 RX ORDER — SODIUM CHLORIDE 9 MG/ML
INJECTION, SOLUTION INTRAVENOUS PRN
Status: DISCONTINUED | OUTPATIENT
Start: 2023-07-17 | End: 2023-07-21 | Stop reason: HOSPADM

## 2023-07-17 RX ORDER — ATORVASTATIN CALCIUM 20 MG/1
20 TABLET, FILM COATED ORAL DAILY
COMMUNITY

## 2023-07-17 RX ORDER — CETIRIZINE HYDROCHLORIDE 5 MG/1
5 TABLET ORAL DAILY
Status: DISCONTINUED | OUTPATIENT
Start: 2023-07-17 | End: 2023-07-17

## 2023-07-17 RX ORDER — ONDANSETRON 4 MG/1
4 TABLET, ORALLY DISINTEGRATING ORAL EVERY 8 HOURS PRN
Status: DISCONTINUED | OUTPATIENT
Start: 2023-07-17 | End: 2023-07-21 | Stop reason: HOSPADM

## 2023-07-17 RX ORDER — PANTOPRAZOLE SODIUM 40 MG/1
40 TABLET, DELAYED RELEASE ORAL DAILY
Status: DISCONTINUED | OUTPATIENT
Start: 2023-07-17 | End: 2023-07-19

## 2023-07-17 RX ORDER — ARIPIPRAZOLE 5 MG/1
20 TABLET ORAL DAILY
Status: DISCONTINUED | OUTPATIENT
Start: 2023-07-17 | End: 2023-07-21 | Stop reason: HOSPADM

## 2023-07-17 RX ORDER — ENOXAPARIN SODIUM 100 MG/ML
30 INJECTION SUBCUTANEOUS 2 TIMES DAILY
Status: DISCONTINUED | OUTPATIENT
Start: 2023-07-17 | End: 2023-07-21 | Stop reason: HOSPADM

## 2023-07-17 RX ORDER — MONTELUKAST SODIUM 10 MG/1
10 TABLET ORAL EVERY EVENING
Status: DISCONTINUED | OUTPATIENT
Start: 2023-07-17 | End: 2023-07-21 | Stop reason: HOSPADM

## 2023-07-17 RX ORDER — ACETAMINOPHEN 325 MG/1
650 TABLET ORAL EVERY 6 HOURS PRN
Status: DISCONTINUED | OUTPATIENT
Start: 2023-07-17 | End: 2023-07-21 | Stop reason: HOSPADM

## 2023-07-17 RX ORDER — TROSPIUM CHLORIDE 20 MG/1
20 TABLET, FILM COATED ORAL NIGHTLY
Status: DISCONTINUED | OUTPATIENT
Start: 2023-07-17 | End: 2023-07-21 | Stop reason: HOSPADM

## 2023-07-17 RX ORDER — ARIPIPRAZOLE 20 MG/1
20 TABLET ORAL DAILY
COMMUNITY

## 2023-07-17 RX ORDER — ATORVASTATIN CALCIUM 20 MG/1
20 TABLET, FILM COATED ORAL DAILY
Status: DISCONTINUED | OUTPATIENT
Start: 2023-07-17 | End: 2023-07-21 | Stop reason: HOSPADM

## 2023-07-17 RX ORDER — ACETAMINOPHEN 650 MG/1
650 SUPPOSITORY RECTAL EVERY 6 HOURS PRN
Status: DISCONTINUED | OUTPATIENT
Start: 2023-07-17 | End: 2023-07-21 | Stop reason: HOSPADM

## 2023-07-17 RX ORDER — POLYETHYLENE GLYCOL 3350 17 G/17G
17 POWDER, FOR SOLUTION ORAL DAILY PRN
Status: DISCONTINUED | OUTPATIENT
Start: 2023-07-17 | End: 2023-07-21 | Stop reason: HOSPADM

## 2023-07-17 RX ORDER — INSULIN LISPRO 100 [IU]/ML
0-16 INJECTION, SOLUTION INTRAVENOUS; SUBCUTANEOUS
Status: DISCONTINUED | OUTPATIENT
Start: 2023-07-18 | End: 2023-07-21 | Stop reason: HOSPADM

## 2023-07-17 RX ORDER — ASPIRIN 81 MG/1
81 TABLET ORAL DAILY
Status: DISCONTINUED | OUTPATIENT
Start: 2023-07-17 | End: 2023-07-21 | Stop reason: HOSPADM

## 2023-07-17 RX ADMIN — GABAPENTIN 200 MG: 100 CAPSULE ORAL at 22:00

## 2023-07-17 RX ADMIN — SODIUM CHLORIDE, PRESERVATIVE FREE 10 ML: 5 INJECTION INTRAVENOUS at 22:01

## 2023-07-17 RX ADMIN — ACETAMINOPHEN 650 MG: 325 TABLET ORAL at 22:03

## 2023-07-17 RX ADMIN — ENOXAPARIN SODIUM 30 MG: 100 INJECTION SUBCUTANEOUS at 22:01

## 2023-07-17 RX ADMIN — MONTELUKAST 10 MG: 10 TABLET, FILM COATED ORAL at 22:01

## 2023-07-17 RX ADMIN — ATORVASTATIN CALCIUM 20 MG: 20 TABLET, FILM COATED ORAL at 22:01

## 2023-07-17 RX ADMIN — TROSPIUM CHLORIDE 20 MG: 20 TABLET, FILM COATED ORAL at 22:00

## 2023-07-17 ASSESSMENT — LIFESTYLE VARIABLES
HOW MANY STANDARD DRINKS CONTAINING ALCOHOL DO YOU HAVE ON A TYPICAL DAY: PATIENT DOES NOT DRINK
HOW OFTEN DO YOU HAVE A DRINK CONTAINING ALCOHOL: NEVER

## 2023-07-17 ASSESSMENT — PAIN DESCRIPTION - ORIENTATION: ORIENTATION: LEFT

## 2023-07-17 ASSESSMENT — PAIN SCALES - GENERAL
PAINLEVEL_OUTOF10: 0
PAINLEVEL_OUTOF10: 5

## 2023-07-17 ASSESSMENT — HEART SCORE: ECG: 1

## 2023-07-17 ASSESSMENT — PAIN - FUNCTIONAL ASSESSMENT: PAIN_FUNCTIONAL_ASSESSMENT: 0-10

## 2023-07-17 ASSESSMENT — PAIN DESCRIPTION - LOCATION: LOCATION: ARM

## 2023-07-17 NOTE — CARE COORDINATION
07/17/23 1727   Service Assessment   Patient Orientation Alert and Oriented   Cognition Alert   History Provided By Patient   Primary Caregiver Self  (Facility staff.)   Accompanied By/Relationship Pt alone during interview. Support Systems Children;Family Members   Patient's Healthcare Decision Maker is: Legal Next of [facility]   PCP Verified by CM Yes  (Meredith Delgado - seen @ the facility.)   Prior Functional Level Assistance with the following:;Cooking; Bathing;Dressing;Housework; Shopping;Mobility; Toileting  (Uses rollator/w/c.)   Current Functional Level Assistance with the following:;Bathing;Dressing;Cooking;Housework; Shopping;Mobility; Toileting   Can patient return to prior living arrangement Yes   Ability to make needs known: Good   Family able to assist with home care needs: Yes   Would you like for me to discuss the discharge plan with any other family members/significant others, and if so, who? Yes  (Daughters Moe Dears,)   [employer]; Medicare   [facility]  (Pt signed Choice Letter to return to [facility].)   Social/Functional History   Lives With Other (comment)  (Facility)   Type of Home Assisted living   Home Layout One level   Home Access Level entry   Bathroom Shower/Tub Walk-in shower   Bathroom Toilet Handicap height   [address] bars in shower;Grab bars around toilet   Bathroom Accessibility Wheelchair accessible   [address]; Helyn Schwab Help From Family; Other (comment)  (Facility staff.)   ADL Assistance Needs assistance   Toileting Needs assistance   Homemaking Assistance Needs assistance   Homemaking Responsibilities No   Ambulation Assistance Needs assistance   Transfer Assistance Needs assistance   Active  No   Occupation Retired   Discharge Planning   Type of [address] [place] Other (Comment)  (Facility.)   Potential

## 2023-07-17 NOTE — H&P
History and Physical    NAME:   Muna Mercado   :  1945   MRN:  [id number]     Date/Time: 2023 6:34 PM    Patient PCP: Rubio Cadena MD  ______________________________________________________________________       Subjective:     CHIEF COMPLAINT: chest discomfort         HISTORY OF PRESENT ILLNESS:     Patient is a 68y.o. year old female with PMHx of CAD, hypertension, hyperlipidemia, PVD, DM who was admitted to the hospital due to chest discomfort and abnormal EKG today. Patient presented to the hospital for a pre-op evaluation for her cath procedure on , but her EKG showed AV node dysfunction and was referred by her cardiologist to get admitted to the hospital. Patient experiences headache, dizziness, shortness of breath, productive cough, nausea, but denies chest pain, palpitation, vomiting and abdominal pain. ED work up:   /91  Glucose 155  BUN 23  Creatinine 1.09   BUN/Cr ratio 21   Magnesium 2.3    Troponin 6   Uranalysis reveal elevated WBC, bacteria, glucose, and nitrite      Chest x-ray - normal     EKG - possible AV node dysfunction     Past Medical History:   Diagnosis Date    Acid indigestion 3/24/2016    Anxiety 3/24/2016    Arthritis     Asthma     Asthma 3/24/2016    Blurred vision, bilateral 3/24/2016    Chest pain 3/24/2016    sometimes    Constipation 3/24/2016    Coughing 3/24/2016    Diabetes ([address])     Dizziness 3/24/2016    Fast heart beat 3/24/2016    sometimes    Frequency of urination 3/24/2016    Frequent headaches 3/24/2016    Heart failure ([address])     Hemorrhoids 3/24/2016    Hernia, hiatal 3/24/2016    Never treated for it, was told they would not operate on her because of her diabetes.     High blood pressure 3/24/2016    Hypertension     Joint swelling 3/24/2016    Multiple joint pain 3/24/2016    Multiple stiff joints 3/24/2016    Muscle ache 3/24/2016    Muscle pain 3/24/2016    Sinus congestion 3/24/2016    Sleep apnea 3/24/2016

## 2023-07-17 NOTE — PERIOP NOTE
1500- Patient brought back to [facility] EKG completed. Abnormal EKG result called to Dr. aL Lozano and made aware patient stated has had some chest discomfort today along with dizziness and shortness of breath. Instructed by Dr. La Lozano to take patient to the emergency room for further evaluation. 1505-Patient transferred to the ER bedside report given to [facility], [facility].- Patient's assisted living facility made aware patient transferred to the ER.

## 2023-07-17 NOTE — CONSULTS
Disp: , Rfl:     losartan (COZAAR) 100 MG tablet, Take 1 tablet by mouth daily, Disp: , Rfl:     montelukast (SINGULAIR) 10 MG tablet, Take 1 tablet by mouth every evening, Disp: , Rfl:     nitroGLYCERIN (NITROSTAT) 0.4 MG SL tablet, Take 1 tablet by mouth, Disp: , Rfl:     pantoprazole (PROTONIX) 40 MG tablet, Take 1 tablet by mouth daily, Disp: , Rfl:     tolterodine (DETROL LA) 4 MG extended release capsule, Take 1 capsule by mouth daily, Disp: , Rfl:     umeclidinium bromide (INCRUSE ELLIPTA) 62.5 MCG/ACT inhaler, Take 1 puff by mouth daily, Disp: , Rfl:      ALLERGIES:  Allergies reviewed with the patient,  Allergies   Allergen Reactions    Azithromycin Other (See Comments)     hallucinations    Diphenhydramine Other (See Comments)     hallucinations  hallucinations      Quetiapine Other (See Comments)     Hallucinations. hallucinations      Iodine Itching    Sulfa Antibiotics Itching    Tomato Itching     Tomato sauce     Erythromycin Other (See Comments)     halllucinations    . FAMILY HISTORY:  Family history reviewed. SOCIAL HISTORY:  Notable for no tobacco use, no heavy alcohol or illicit drug use. REVIEW OF SYSTEMS:  Complete review of systems performed, pertinents noted above, all other systems are negative. PHYSICAL EXAMINATION:    General:  Alert, in NAD  Cardiovascular:  RRR, no murmur  Respiratory:  Lungs are clear  Abdomen:  soft, nontender  Extremities:  trace lower extremity edema  Skin:  warm  Psych:  Normal affect      Vitals:    07/17/23 1517   BP: (!) 143/94   Pulse: 81   Resp: 16   Temp:    SpO2: 99%       Recent labs results and imaging reviewed. Discussed case with Dr. Princess Oconnor and our impression and recommendations are as follows:   Abnormal ECG, appears to have some sort of AV node dysfunction on initial ECG in [facility], now SR  Check trop, electrolytes, TSH  Continue tele  Echo 5/23 with EF 60-65%, grade one diastolic dysfunction  Recent monitor worn for 17 days in office with sinus rhythm, rare ectopy and no significant arrrhythmia  Chest pain/abnormal CCTA, cath planned for Thursday 7/20  Check troponin as above  Cath planned for this week given ongoing chest pain and dyspnea with extensive calcium on CCTA at [facility]  Continue ASA, statin, BB  Hypertension, blood pressure acceptable, on lisinopril 10, metoprolol tartrate 25 BID, and Imdur 30  Hyperlipidemia, continue statin  Diabetes, per primary    Thank you for involving us in the care of this patient. Please do not hesitate to call me or Dr. Shaun Macias if additional questions arise.     Rogelio Singletary, APRN - NP  7/17/2023

## 2023-07-17 NOTE — H&P
PORTABLE    Result Date: 7/17/2023  No acute process. XR CHEST PORTABLE   Final Result      No acute process. Review of Systems:  Constitutional: Positive for headache. Negative for chills and fever. HENT: Negative. Eyes: Negative. Respiratory: Positive for shortness of breath, productive cough    Cardiovascular: Negative. Gastrointestinal: Positive for nausea and diarrhea. Negative for abdominal pain. Skin: Negative. Neurological: Negative. Objective:   VITALS:    Vitals:    07/17/23 1615   BP: (!) 120/91   Pulse: 81   Resp: 20   Temp:    SpO2: 97%       Physical Exam:   Constitutional: pt is oriented to person, place, and time. Head: Normocephalic and atraumatic. Eyes: Pupils are equal, round, and reactive to light. EOM are normal.   Cardiovascular: Normal rate, regular rhythm and normal heart sounds. Peripheral edema on bilateral legs. Pulmonary/Chest: Breath sounds normal. No wheezes. No rales. Exhibits no tenderness. Abdominal: Soft. Bowel sounds are normal. There is no abdominal tenderness. There is no rebound and no guarding. Musculoskeletal: Normal range of motion. Neurological: pt is alert and oriented to person, place, and time. Alert. Normal strength. No cranial nerve deficit or sensory deficit. Displays a negative Romberg sign.         ASSESSMENT & PLAN:  AV node dysfunction   Chest pain  IVON   Hypertension   Hyperlipidemia   Diabetes   Previous echo shows ejection fraction of 60 to 65%        Admit to telemetry floor cardiology was consulted  And started the following medications      Current Facility-Administered Medications:     ARIPiprazole (ABILIFY) tablet 20 mg, 20 mg, Oral, Daily, Faustino Jorge MD    aspirin EC tablet 81 mg, 81 mg, Oral, Daily, Faustino Jorge MD    atorvastatin (LIPITOR) tablet 20 mg, 20 mg, Oral, Daily, Faustino Jorge MD    cyclobenzaprine (FLEXERIL) tablet 10 mg, 10 mg, Oral, TID PRN, Kathy Mosley MD    gabapentin (NEURONTIN) capsule 200 mg, 200 mg, Oral, TID, Anam Jorge MD    cetirizine HCl (ZyrTEC) 5 MG/5ML solution 5 mg, 5 mg, Oral, Daily, Faustino Jorge MD    losartan (COZAAR) tablet 100 mg, 100 mg, Oral, Daily, Faustino Jorge MD    montelukast (SINGULAIR) tablet 10 mg, 10 mg, Oral, QPM, Faustino Jorge MD    nitroGLYCERIN (NITROSTAT) SL tablet 0.4 mg, 0.4 mg, SubLINGual, Q5 Min PRN, Faustino Jorge MD    pantoprazole (PROTONIX) tablet 40 mg, 40 mg, Oral, Daily, Faustino Jorge MD    trospium (SANCTURA) tablet 20 mg, 20 mg, Oral, Nightly, Faustino Jorge MD    umeclidinium bromide (INCRUSE ELLIPTA) inhaler 1 puff, 1 puff, Inhalation, Daily, Belkis Koo MD    [START ON 7/18/2023] insulin lispro (HUMALOG) injection vial 0-16 Units, 0-16 Units, SubCUTAneous, TID WC, Faustino Jorge MD    insulin lispro (HUMALOG) injection vial 0-4 Units, 0-4 Units, SubCUTAneous, Nightly, Faustino Jorge MD    glucose chewable tablet 16 g, 4 tablet, Oral, PRN, Anam Jorge MD    dextrose bolus 10% 125 mL, 125 mL, IntraVENous, PRN **OR** dextrose bolus 10% 250 mL, 250 mL, IntraVENous, PRN, Anam Jorge MD    glucagon injection 1 mg, 1 mg, SubCUTAneous, PRN, Anam Jorge MD    dextrose 10 % infusion, , IntraVENous, Continuous PRN, Anam Jorge MD    sodium chloride flush 0.9 % injection 5-40 mL, 5-40 mL, IntraVENous, 2 times per day, Belkis Koo MD    sodium chloride flush 0.9 % injection 5-40 mL, 5-40 mL, IntraVENous, PRN, Anam Jorge MD    0.9 % sodium chloride infusion, , IntraVENous, PRN, Anam Jorge MD    enoxaparin Sodium (LOVENOX) injection 30 mg, 30 mg, SubCUTAneous, BID, Faustino Jorge MD    ondansetron (ZOFRAN-ODT) disintegrating tablet 4 mg, 4 mg, Oral, Q8H PRN **OR** ondansetron (ZOFRAN) injection 4 mg, 4 mg, IntraVENous, Q6H PRN, Anam Jorge MD    polyethylene glycol (GLYCOLAX) packet 17 g, 17 g, Oral, Daily PRN, Anam Jorge MD    acetaminophen (TYLENOL) tablet 650 mg,

## 2023-07-17 NOTE — ED PROVIDER NOTES
Mon Jul 17, 2023   1629 69 yo female who presents for possible heart block. Patient is having dizziness and generalized malaise. Was in preop today where she is getting evaluated for cardiac cath on Thursday. Was found to be in a possible 2-1 heart block. On arrival here patient was ill-appearing and diaphoretic. Repeat EKG here showed normal sinus rhythm. Obtain labs including a CBC, CMP, troponin, TSH, BMP as well as a chest x-ray. Anticipate admission. [LW]   0081 Dr. Lyndsay George of cardiology recommended admission. Work-up is negative. No heparin indicated at this time.   Admitted to Dr. Jessica Keene [LW]      ED Course User Index  [LW] Anna Nair MD       Disposition Considerations (Tests not done, Shared Decision Making, Pt Expectation of Test or Treatment.): See ED Course    Patient was given the following medications:  Medications   ARIPiprazole (ABILIFY) tablet 20 mg (20 mg Oral Not Given 7/17/23 2159)   aspirin EC tablet 81 mg (81 mg Oral Not Given 7/17/23 2159)   atorvastatin (LIPITOR) tablet 20 mg (20 mg Oral Given 7/17/23 2201)   cyclobenzaprine (FLEXERIL) tablet 10 mg (has no administration in time range)   gabapentin (NEURONTIN) capsule 200 mg (200 mg Oral Given 7/17/23 2200)   losartan (COZAAR) tablet 100 mg (100 mg Oral Not Given 7/17/23 2200)   montelukast (SINGULAIR) tablet 10 mg (10 mg Oral Given 7/17/23 2201)   nitroGLYCERIN (NITROSTAT) SL tablet 0.4 mg (has no administration in time range)   pantoprazole (PROTONIX) tablet 40 mg (40 mg Oral Not Given 7/17/23 2200)   trospium (SANCTURA) tablet 20 mg (20 mg Oral Given 7/17/23 2200)   tiotropium (SPIRIVA RESPIMAT) 2.5 MCG/ACT inhaler 2 puff (2 puffs Inhalation Not Given 7/17/23 2014)   insulin lispro (HUMALOG) injection vial 0-16 Units (has no administration in time range)   insulin lispro (HUMALOG) injection vial 0-4 Units (0 Units SubCUTAneous Not Given 7/17/23 2208)   glucose chewable tablet 16 g (has no administration in time range) dextrose bolus 10% 125 mL (has no administration in time range)     Or   dextrose bolus 10% 250 mL (has no administration in time range)   glucagon injection 1 mg (has no administration in time range)   dextrose 10 % infusion (has no administration in time range)   sodium chloride flush 0.9 % injection 5-40 mL (10 mLs IntraVENous Given 7/17/23 2201)   sodium chloride flush 0.9 % injection 5-40 mL (has no administration in time range)   0.9 % sodium chloride infusion (has no administration in time range)   enoxaparin Sodium (LOVENOX) injection 30 mg (30 mg SubCUTAneous Given 7/17/23 2201)   ondansetron (ZOFRAN-ODT) disintegrating tablet 4 mg (has no administration in time range)     Or   ondansetron (ZOFRAN) injection 4 mg (has no administration in time range)   polyethylene glycol (GLYCOLAX) packet 17 g (has no administration in time range)   acetaminophen (TYLENOL) tablet 650 mg (650 mg Oral Given 7/17/23 2203)     Or   acetaminophen (TYLENOL) suppository 650 mg ( Rectal See Alternative 7/17/23 2203)       CONSULTS: (Who and What was discussed)  IP CONSULT TO CARDIOLOGY     Social Determinants affecting Dx or Tx: None    Smoking Cessation: Not Applicable    PROCEDURES   Unless otherwise noted above, none  Procedures      CRITICAL CARE TIME   Patient does not meet Critical Care Time, 0 minutes    ED FINAL IMPRESSION     1. Cardiac arrhythmia, unspecified cardiac arrhythmia type    2. Coronary artery disease involving native heart with angina pectoris, unspecified vessel or lesion type [facility])          DISPOSITION/PLAN   DISPOSITION Admitted 07/17/2023 04:38:09 PM    Admit Note: Pt is being admitted by Dr. Sherrill Tovar. The results of their tests and reason(s) for their admission have been discussed with pt and/or available family. They convey agreement and understanding for the need to be admitted and for the admission diagnosis. I am the Primary Clinician of Record.  Franklyn Granados MD (electronically

## 2023-07-18 LAB
ALBUMIN SERPL-MCNC: 3.2 G/DL (ref 3.5–5)
ALBUMIN/GLOB SERPL: 0.9 (ref 1.1–2.2)
ALP SERPL-CCNC: 85 U/L (ref 45–117)
ALT SERPL-CCNC: 17 U/L (ref 12–78)
ANION GAP SERPL CALC-SCNC: 5 MMOL/L (ref 5–15)
APPEARANCE UR: CLEAR
AST SERPL W P-5'-P-CCNC: 12 U/L (ref 15–37)
BACTERIA URNS QL MICRO: NEGATIVE /HPF
BASOPHILS # BLD: 0 K/UL (ref 0–0.1)
BASOPHILS NFR BLD: 1 % (ref 0–1)
BILIRUB SERPL-MCNC: 0.4 MG/DL (ref 0.2–1)
BILIRUB UR QL: NEGATIVE
BUN SERPL-MCNC: 26 MG/DL (ref 6–20)
BUN/CREAT SERPL: 25 (ref 12–20)
CA-I BLD-MCNC: 9 MG/DL (ref 8.5–10.1)
CHLORIDE SERPL-SCNC: 107 MMOL/L (ref 97–108)
CO2 SERPL-SCNC: 28 MMOL/L (ref 21–32)
COLOR UR: ABNORMAL
CREAT SERPL-MCNC: 1.05 MG/DL (ref 0.55–1.02)
DIFFERENTIAL METHOD BLD: ABNORMAL
EKG ATRIAL RATE: 81 BPM
EKG ATRIAL RATE: 98 BPM
EKG DIAGNOSIS: NORMAL
EKG DIAGNOSIS: NORMAL
EKG P AXIS: 50 DEGREES
EKG P AXIS: 86 DEGREES
EKG P-R INTERVAL: 162 MS
EKG Q-T INTERVAL: 352 MS
EKG Q-T INTERVAL: 360 MS
EKG QRS DURATION: 86 MS
EKG QRS DURATION: 92 MS
EKG QTC CALCULATION (BAZETT): 408 MS
EKG QTC CALCULATION (BAZETT): 423 MS
EKG R AXIS: -16 DEGREES
EKG R AXIS: 14 DEGREES
EKG T AXIS: 14 DEGREES
EKG T AXIS: 21 DEGREES
EKG VENTRICULAR RATE: 81 BPM
EKG VENTRICULAR RATE: 83 BPM
EOSINOPHIL # BLD: 0.1 K/UL (ref 0–0.4)
EOSINOPHIL NFR BLD: 2 % (ref 0–7)
ERYTHROCYTE [DISTWIDTH] IN BLOOD BY AUTOMATED COUNT: 13.5 % (ref 11.5–14.5)
EST. AVERAGE GLUCOSE BLD GHB EST-MCNC: 220 MG/DL
GLOBULIN SER CALC-MCNC: 3.7 G/DL (ref 2–4)
GLUCOSE BLD STRIP.AUTO-MCNC: 158 MG/DL (ref 65–100)
GLUCOSE BLD STRIP.AUTO-MCNC: 159 MG/DL (ref 65–100)
GLUCOSE BLD STRIP.AUTO-MCNC: 229 MG/DL (ref 65–100)
GLUCOSE BLD STRIP.AUTO-MCNC: 237 MG/DL (ref 65–100)
GLUCOSE SERPL-MCNC: 197 MG/DL (ref 65–100)
GLUCOSE UR STRIP.AUTO-MCNC: >300 MG/DL
HBA1C MFR BLD: 9.3 % (ref 4–5.6)
HCT VFR BLD AUTO: 40.6 % (ref 35–47)
HGB BLD-MCNC: 12.4 G/DL (ref 11.5–16)
HGB UR QL STRIP: NEGATIVE
IMM GRANULOCYTES # BLD AUTO: 0.1 K/UL (ref 0–0.04)
IMM GRANULOCYTES NFR BLD AUTO: 1 % (ref 0–0.5)
KETONES UR QL STRIP.AUTO: NEGATIVE MG/DL
LEUKOCYTE ESTERASE UR QL STRIP.AUTO: ABNORMAL
LYMPHOCYTES # BLD: 2.3 K/UL (ref 0.8–3.5)
LYMPHOCYTES NFR BLD: 38 % (ref 12–49)
MCH RBC QN AUTO: 28.8 PG (ref 26–34)
MCHC RBC AUTO-ENTMCNC: 30.5 G/DL (ref 30–36.5)
MCV RBC AUTO: 94.4 FL (ref 80–99)
MONOCYTES # BLD: 0.5 K/UL (ref 0–1)
MONOCYTES NFR BLD: 8 % (ref 5–13)
MUCOUS THREADS URNS QL MICRO: ABNORMAL /LPF
NEUTS SEG # BLD: 3.1 K/UL (ref 1.8–8)
NEUTS SEG NFR BLD: 50 % (ref 32–75)
NITRITE UR QL STRIP.AUTO: POSITIVE
NRBC # BLD: 0 K/UL (ref 0–0.01)
NRBC BLD-RTO: 0 PER 100 WBC
PERFORMED BY:: ABNORMAL
PH UR STRIP: 8 (ref 5–8)
PLATELET # BLD AUTO: 180 K/UL (ref 150–400)
PMV BLD AUTO: 11 FL (ref 8.9–12.9)
POTASSIUM SERPL-SCNC: 4 MMOL/L (ref 3.5–5.1)
PROT SERPL-MCNC: 6.9 G/DL (ref 6.4–8.2)
PROT UR STRIP-MCNC: NEGATIVE MG/DL
RBC # BLD AUTO: 4.3 M/UL (ref 3.8–5.2)
RBC #/AREA URNS HPF: ABNORMAL /HPF (ref 0–5)
SODIUM SERPL-SCNC: 140 MMOL/L (ref 136–145)
SP GR UR REFRACTOMETRY: 1.02 (ref 1–1.03)
UROBILINOGEN UR QL STRIP.AUTO: 0.1 EU/DL (ref 0.1–1)
WBC # BLD AUTO: 6.1 K/UL (ref 3.6–11)
WBC URNS QL MICRO: ABNORMAL /HPF (ref 0–4)

## 2023-07-18 PROCEDURE — 80053 COMPREHEN METABOLIC PANEL: CPT

## 2023-07-18 PROCEDURE — 6370000000 HC RX 637 (ALT 250 FOR IP): Performed by: FAMILY MEDICINE

## 2023-07-18 PROCEDURE — 6360000002 HC RX W HCPCS: Performed by: FAMILY MEDICINE

## 2023-07-18 PROCEDURE — 2060000000 HC ICU INTERMEDIATE R&B

## 2023-07-18 PROCEDURE — 94640 AIRWAY INHALATION TREATMENT: CPT

## 2023-07-18 PROCEDURE — 82962 GLUCOSE BLOOD TEST: CPT

## 2023-07-18 PROCEDURE — 2580000003 HC RX 258: Performed by: FAMILY MEDICINE

## 2023-07-18 PROCEDURE — 81001 URINALYSIS AUTO W/SCOPE: CPT

## 2023-07-18 PROCEDURE — 85025 COMPLETE CBC W/AUTO DIFF WBC: CPT

## 2023-07-18 PROCEDURE — 36415 COLL VENOUS BLD VENIPUNCTURE: CPT

## 2023-07-18 RX ORDER — FAMOTIDINE 20 MG/1
20 TABLET, FILM COATED ORAL 2 TIMES DAILY
Status: DISCONTINUED | OUTPATIENT
Start: 2023-07-18 | End: 2023-07-21 | Stop reason: HOSPADM

## 2023-07-18 RX ORDER — SUCRALFATE 1 G/1
1 TABLET ORAL 2 TIMES DAILY
COMMUNITY

## 2023-07-18 RX ORDER — SERTRALINE HYDROCHLORIDE 25 MG/1
25 TABLET, FILM COATED ORAL
Status: DISCONTINUED | OUTPATIENT
Start: 2023-07-18 | End: 2023-07-21 | Stop reason: HOSPADM

## 2023-07-18 RX ORDER — SERTRALINE HYDROCHLORIDE 25 MG/1
25 TABLET, FILM COATED ORAL
COMMUNITY

## 2023-07-18 RX ORDER — PREGABALIN 150 MG/1
150 CAPSULE ORAL NIGHTLY
COMMUNITY

## 2023-07-18 RX ORDER — ALOGLIPTIN 12.5 MG/1
12.5 TABLET, FILM COATED ORAL DAILY
Status: DISCONTINUED | OUTPATIENT
Start: 2023-07-18 | End: 2023-07-21 | Stop reason: HOSPADM

## 2023-07-18 RX ORDER — ISOSORBIDE MONONITRATE 30 MG/1
30 TABLET, EXTENDED RELEASE ORAL DAILY
Status: DISCONTINUED | OUTPATIENT
Start: 2023-07-18 | End: 2023-07-21 | Stop reason: HOSPADM

## 2023-07-18 RX ORDER — SUCRALFATE 1 G/1
1 TABLET ORAL 2 TIMES DAILY
Status: DISCONTINUED | OUTPATIENT
Start: 2023-07-18 | End: 2023-07-21 | Stop reason: HOSPADM

## 2023-07-18 RX ORDER — FAMOTIDINE 20 MG/1
20 TABLET, FILM COATED ORAL 2 TIMES DAILY
COMMUNITY

## 2023-07-18 RX ORDER — ONDANSETRON 4 MG/1
4 TABLET, FILM COATED ORAL EVERY 12 HOURS PRN
Status: ON HOLD | COMMUNITY
End: 2023-07-21 | Stop reason: HOSPADM

## 2023-07-18 RX ORDER — BENZONATATE 100 MG/1
100 CAPSULE ORAL 2 TIMES DAILY PRN
Status: ON HOLD | COMMUNITY
End: 2023-07-21 | Stop reason: HOSPADM

## 2023-07-18 RX ORDER — LISINOPRIL 10 MG/1
10 TABLET ORAL DAILY
Status: ON HOLD | COMMUNITY
End: 2023-07-21 | Stop reason: HOSPADM

## 2023-07-18 RX ORDER — HYDROXYZINE 50 MG/1
50 TABLET, FILM COATED ORAL NIGHTLY PRN
COMMUNITY

## 2023-07-18 RX ORDER — LISINOPRIL 5 MG/1
10 TABLET ORAL DAILY
Status: DISCONTINUED | OUTPATIENT
Start: 2023-07-18 | End: 2023-07-18

## 2023-07-18 RX ORDER — INSULIN GLARGINE 100 [IU]/ML
30 INJECTION, SOLUTION SUBCUTANEOUS 2 TIMES DAILY
COMMUNITY

## 2023-07-18 RX ORDER — PREGABALIN 150 MG/1
150 CAPSULE ORAL NIGHTLY
Status: DISCONTINUED | OUTPATIENT
Start: 2023-07-18 | End: 2023-07-21 | Stop reason: HOSPADM

## 2023-07-18 RX ORDER — INSULIN GLARGINE 100 [IU]/ML
30 INJECTION, SOLUTION SUBCUTANEOUS 2 TIMES DAILY
Status: DISCONTINUED | OUTPATIENT
Start: 2023-07-18 | End: 2023-07-21 | Stop reason: HOSPADM

## 2023-07-18 RX ORDER — HYDROXYZINE HYDROCHLORIDE 25 MG/1
50 TABLET, FILM COATED ORAL NIGHTLY PRN
Status: DISCONTINUED | OUTPATIENT
Start: 2023-07-18 | End: 2023-07-21 | Stop reason: HOSPADM

## 2023-07-18 RX ORDER — ISOSORBIDE MONONITRATE 30 MG/1
30 TABLET, EXTENDED RELEASE ORAL DAILY
COMMUNITY

## 2023-07-18 RX ORDER — VITAMIN B COMPLEX
1000 TABLET ORAL DAILY
Status: DISCONTINUED | OUTPATIENT
Start: 2023-07-18 | End: 2023-07-21 | Stop reason: HOSPADM

## 2023-07-18 RX ORDER — LOPERAMIDE HYDROCHLORIDE 2 MG/1
2 CAPSULE ORAL 3 TIMES DAILY PRN
Status: ON HOLD | COMMUNITY
End: 2023-07-21 | Stop reason: HOSPADM

## 2023-07-18 RX ORDER — GLIPIZIDE 5 MG/1
5 TABLET ORAL
Status: ON HOLD | COMMUNITY
End: 2023-07-21 | Stop reason: HOSPADM

## 2023-07-18 RX ADMIN — METOPROLOL TARTRATE 25 MG: 25 TABLET, FILM COATED ORAL at 13:13

## 2023-07-18 RX ADMIN — ACETAMINOPHEN 650 MG: 325 TABLET ORAL at 08:38

## 2023-07-18 RX ADMIN — POLYETHYLENE GLYCOL 3350 17 G: 17 POWDER, FOR SOLUTION ORAL at 13:11

## 2023-07-18 RX ADMIN — FAMOTIDINE 20 MG: 20 TABLET ORAL at 13:14

## 2023-07-18 RX ADMIN — PREGABALIN 150 MG: 150 CAPSULE ORAL at 21:21

## 2023-07-18 RX ADMIN — SUCRALFATE 1 G: 1 TABLET ORAL at 21:23

## 2023-07-18 RX ADMIN — CYCLOBENZAPRINE 10 MG: 10 TABLET, FILM COATED ORAL at 13:14

## 2023-07-18 RX ADMIN — ISOSORBIDE MONONITRATE 30 MG: 30 TABLET, EXTENDED RELEASE ORAL at 13:14

## 2023-07-18 RX ADMIN — ASPIRIN 81 MG: 81 TABLET, COATED ORAL at 08:39

## 2023-07-18 RX ADMIN — ARIPIPRAZOLE 20 MG: 5 TABLET ORAL at 08:39

## 2023-07-18 RX ADMIN — SODIUM CHLORIDE, PRESERVATIVE FREE 10 ML: 5 INJECTION INTRAVENOUS at 21:21

## 2023-07-18 RX ADMIN — FAMOTIDINE 20 MG: 20 TABLET ORAL at 21:21

## 2023-07-18 RX ADMIN — Medication 1000 UNITS: at 13:13

## 2023-07-18 RX ADMIN — SERTRALINE 25 MG: 25 TABLET, FILM COATED ORAL at 21:21

## 2023-07-18 RX ADMIN — INSULIN LISPRO 4 UNITS: 100 INJECTION, SOLUTION INTRAVENOUS; SUBCUTANEOUS at 13:11

## 2023-07-18 RX ADMIN — SUCRALFATE 1 G: 1 TABLET ORAL at 13:13

## 2023-07-18 RX ADMIN — ATORVASTATIN CALCIUM 20 MG: 20 TABLET, FILM COATED ORAL at 08:40

## 2023-07-18 RX ADMIN — GABAPENTIN 200 MG: 100 CAPSULE ORAL at 08:40

## 2023-07-18 RX ADMIN — EMPAGLIFLOZIN 10 MG: 10 TABLET, FILM COATED ORAL at 13:12

## 2023-07-18 RX ADMIN — MONTELUKAST 10 MG: 10 TABLET, FILM COATED ORAL at 17:27

## 2023-07-18 RX ADMIN — TROSPIUM CHLORIDE 20 MG: 20 TABLET, FILM COATED ORAL at 21:23

## 2023-07-18 RX ADMIN — ENOXAPARIN SODIUM 30 MG: 100 INJECTION SUBCUTANEOUS at 21:20

## 2023-07-18 RX ADMIN — LOSARTAN POTASSIUM 100 MG: 50 TABLET, FILM COATED ORAL at 08:39

## 2023-07-18 RX ADMIN — INSULIN GLARGINE 30 UNITS: 100 INJECTION, SOLUTION SUBCUTANEOUS at 21:20

## 2023-07-18 RX ADMIN — TIOTROPIUM BROMIDE INHALATION SPRAY 2 PUFF: 3.12 SPRAY, METERED RESPIRATORY (INHALATION) at 10:40

## 2023-07-18 RX ADMIN — ALOGLIPTIN 12.5 MG: 12.5 TABLET, FILM COATED ORAL at 13:13

## 2023-07-18 RX ADMIN — METOPROLOL TARTRATE 25 MG: 25 TABLET, FILM COATED ORAL at 21:20

## 2023-07-18 RX ADMIN — SODIUM CHLORIDE, PRESERVATIVE FREE 5 ML: 5 INJECTION INTRAVENOUS at 08:41

## 2023-07-18 RX ADMIN — PANTOPRAZOLE SODIUM 40 MG: 40 TABLET, DELAYED RELEASE ORAL at 08:41

## 2023-07-18 RX ADMIN — ENOXAPARIN SODIUM 30 MG: 100 INJECTION SUBCUTANEOUS at 08:38

## 2023-07-18 RX ADMIN — INSULIN GLARGINE 30 UNITS: 100 INJECTION, SOLUTION SUBCUTANEOUS at 13:11

## 2023-07-18 ASSESSMENT — PAIN SCALES - GENERAL
PAINLEVEL_OUTOF10: 6
PAINLEVEL_OUTOF10: 2
PAINLEVEL_OUTOF10: 5

## 2023-07-18 ASSESSMENT — PAIN DESCRIPTION - DESCRIPTORS: DESCRIPTORS: ACHING

## 2023-07-18 ASSESSMENT — PAIN SCALES - WONG BAKER
WONGBAKER_NUMERICALRESPONSE: 2
WONGBAKER_NUMERICALRESPONSE: 0

## 2023-07-18 ASSESSMENT — PAIN DESCRIPTION - LOCATION
LOCATION: LEG
LOCATION: LEG

## 2023-07-18 NOTE — PROGRESS NOTES
Albumin 3.7 3.5 - 5.0 g/dL    Globulin 4.4 (H) 2.0 - 4.0 g/dL    Albumin/Globulin Ratio 0.8 (L) 1.1 - 2.2     Troponin    Collection Time: 07/17/23  3:18 PM   Result Value Ref Range    Troponin, High Sensitivity 6 0 - 51 ng/L   Magnesium    Collection Time: 07/17/23  3:18 PM   Result Value Ref Range    Magnesium 2.3 1.6 - 2.4 mg/dL   TSH    Collection Time: 07/17/23  3:18 PM   Result Value Ref Range    TSH, 3RD GENERATION 1.58 0.36 - 3.74 uIU/mL   Brain Natriuretic Peptide    Collection Time: 07/17/23  3:18 PM   Result Value Ref Range    NT Pro- <450 pg/mL   Troponin    Collection Time: 07/17/23  7:37 PM   Result Value Ref Range    Troponin, High Sensitivity 7 0 - 51 ng/L   POCT Glucose    Collection Time: 07/17/23 10:07 PM   Result Value Ref Range    POC Glucose 187 (H) 65 - 100 mg/dL    Performed by: Neyda Viveros    Comprehensive Metabolic Panel w/ Reflex to MG    Collection Time: 07/18/23  3:38 AM   Result Value Ref Range    Sodium 140 136 - 145 mmol/L    Potassium 4.0 3.5 - 5.1 mmol/L    Chloride 107 97 - 108 mmol/L    CO2 28 21 - 32 mmol/L    Anion Gap 5 5 - 15 mmol/L    Glucose 197 (H) 65 - 100 mg/dL    BUN 26 (H) 6 - 20 mg/dL    Creatinine 1.05 (H) 0.55 - 1.02 mg/dL    Bun/Cre Ratio 25 (H) 12 - 20      Est, Glom Filt Rate 55 (L) >60 ml/min/1.73m2    Calcium 9.0 8.5 - 10.1 mg/dL    Total Bilirubin 0.4 0.2 - 1.0 mg/dL    AST 12 (L) 15 - 37 U/L    ALT 17 12 - 78 U/L    Alk Phosphatase 85 45 - 117 U/L    Total Protein 6.9 6.4 - 8.2 g/dL    Albumin 3.2 (L) 3.5 - 5.0 g/dL    Globulin 3.7 2.0 - 4.0 g/dL    Albumin/Globulin Ratio 0.9 (L) 1.1 - 2.2     CBC with Auto Differential    Collection Time: 07/18/23  3:38 AM   Result Value Ref Range    WBC 6.1 3.6 - 11.0 K/uL    RBC 4.30 3.80 - 5.20 M/uL    Hemoglobin 12.4 11.5 - 16.0 g/dL    Hematocrit 40.6 35.0 - 47.0 %    MCV 94.4 80.0 - 99.0 FL    MCH 28.8 26.0 - 34.0 PG    MCHC 30.5 30.0 - 36.5 g/dL    RDW 13.5 11.5 - 14.5 %    Platelets 401 035 - 447 K/uL 100 mg at 07/18/23 0839    montelukast (SINGULAIR) tablet 10 mg, 10 mg, Oral, QPM, Faustino Jorge MD, 10 mg at 07/17/23 2201    nitroGLYCERIN (NITROSTAT) SL tablet 0.4 mg, 0.4 mg, SubLINGual, Q5 Min PRN, Dhaval Monday MD Ania    pantoprazole (PROTONIX) tablet 40 mg, 40 mg, Oral, Daily, Elridge Monday MD Ania, 40 mg at 07/18/23 0841    trospium (SANCTURA) tablet 20 mg, 20 mg, Oral, Nightly, Faustino Jorge MD, 20 mg at 07/17/23 2200    tiotropium (SPIRIVA RESPIMAT) 2.5 MCG/ACT inhaler 2 puff, 2 puff, Inhalation, Daily, Dhaval Monday MD Ania, 2 puff at 07/18/23 1040    insulin lispro (HUMALOG) injection vial 0-16 Units, 0-16 Units, SubCUTAneous, TID WC, Faustino Jorge MD    insulin lispro (HUMALOG) injection vial 0-4 Units, 0-4 Units, SubCUTAneous, Nightly, Faustino Jorge MD    glucose chewable tablet 16 g, 4 tablet, Oral, PRN, Elridge Monday MD Ania    dextrose bolus 10% 125 mL, 125 mL, IntraVENous, PRN **OR** dextrose bolus 10% 250 mL, 250 mL, IntraVENous, PRN, Elnini Monday MD Ania    glucagon injection 1 mg, 1 mg, SubCUTAneous, PRN, Morisge Monday MD Ania    dextrose 10 % infusion, , IntraVENous, Continuous PRN, Dhaval Monday MD Ania    sodium chloride flush 0.9 % injection 5-40 mL, 5-40 mL, IntraVENous, 2 times per day, Julio Rossi MD, 5 mL at 07/18/23 0841    sodium chloride flush 0.9 % injection 5-40 mL, 5-40 mL, IntraVENous, PRN, Dhaval Monday MD Ania    0.9 % sodium chloride infusion, , IntraVENous, PRN, Elnini Monsandra Jorge MD    enoxaparin Sodium (LOVENOX) injection 30 mg, 30 mg, SubCUTAneous, BID, Faustino Jorge MD, 30 mg at 07/18/23 0838    ondansetron (ZOFRAN-ODT) disintegrating tablet 4 mg, 4 mg, Oral, Q8H PRN **OR** ondansetron (ZOFRAN) injection 4 mg, 4 mg, IntraVENous, Q6H PRN, Elridge Monday MD Ania    polyethylene glycol (GLYCOLAX) packet 17 g, 17 g, Oral, Daily PRN, Elridge Monday MD Ania    acetaminophen (TYLENOL) tablet 650 mg, 650 mg, Oral, Q6H PRN, 650 mg at 07/18/23 0838 **OR** acetaminophen (TYLENOL)

## 2023-07-18 NOTE — PROGRESS NOTES
CM reviewed medical record. Patient admitted with chest pain from [facility]. Cardiology plan is for patient to have cardiac cath sometime this week if chest pain continues.

## 2023-07-19 LAB
ALBUMIN SERPL-MCNC: 3.2 G/DL (ref 3.5–5)
ALBUMIN/GLOB SERPL: 0.8 (ref 1.1–2.2)
ALP SERPL-CCNC: 79 U/L (ref 45–117)
ALT SERPL-CCNC: 17 U/L (ref 12–78)
ANION GAP SERPL CALC-SCNC: 6 MMOL/L (ref 5–15)
AST SERPL W P-5'-P-CCNC: 10 U/L (ref 15–37)
BILIRUB SERPL-MCNC: 0.5 MG/DL (ref 0.2–1)
BNP SERPL-MCNC: 48 PG/ML
BUN SERPL-MCNC: 24 MG/DL (ref 6–20)
BUN/CREAT SERPL: 23 (ref 12–20)
CA-I BLD-MCNC: 9.1 MG/DL (ref 8.5–10.1)
CHLORIDE SERPL-SCNC: 109 MMOL/L (ref 97–108)
CO2 SERPL-SCNC: 24 MMOL/L (ref 21–32)
CREAT SERPL-MCNC: 1.04 MG/DL (ref 0.55–1.02)
ERYTHROCYTE [DISTWIDTH] IN BLOOD BY AUTOMATED COUNT: 13.5 % (ref 11.5–14.5)
GLOBULIN SER CALC-MCNC: 3.9 G/DL (ref 2–4)
GLUCOSE BLD STRIP.AUTO-MCNC: 185 MG/DL (ref 65–100)
GLUCOSE BLD STRIP.AUTO-MCNC: 225 MG/DL (ref 65–100)
GLUCOSE BLD STRIP.AUTO-MCNC: 226 MG/DL (ref 65–100)
GLUCOSE BLD STRIP.AUTO-MCNC: 227 MG/DL (ref 65–100)
GLUCOSE SERPL-MCNC: 230 MG/DL (ref 65–100)
HCT VFR BLD AUTO: 39.4 % (ref 35–47)
HGB BLD-MCNC: 12.2 G/DL (ref 11.5–16)
MCH RBC QN AUTO: 28.6 PG (ref 26–34)
MCHC RBC AUTO-ENTMCNC: 31 G/DL (ref 30–36.5)
MCV RBC AUTO: 92.5 FL (ref 80–99)
NRBC # BLD: 0 K/UL (ref 0–0.01)
NRBC BLD-RTO: 0 PER 100 WBC
PERFORMED BY:: ABNORMAL
PLATELET # BLD AUTO: 164 K/UL (ref 150–400)
PMV BLD AUTO: 11 FL (ref 8.9–12.9)
POTASSIUM SERPL-SCNC: 4.2 MMOL/L (ref 3.5–5.1)
PROT SERPL-MCNC: 7.1 G/DL (ref 6.4–8.2)
RBC # BLD AUTO: 4.26 M/UL (ref 3.8–5.2)
SODIUM SERPL-SCNC: 139 MMOL/L (ref 136–145)
WBC # BLD AUTO: 5.4 K/UL (ref 3.6–11)

## 2023-07-19 PROCEDURE — 36415 COLL VENOUS BLD VENIPUNCTURE: CPT

## 2023-07-19 PROCEDURE — 83880 ASSAY OF NATRIURETIC PEPTIDE: CPT

## 2023-07-19 PROCEDURE — 6370000000 HC RX 637 (ALT 250 FOR IP): Performed by: FAMILY MEDICINE

## 2023-07-19 PROCEDURE — 6360000002 HC RX W HCPCS: Performed by: FAMILY MEDICINE

## 2023-07-19 PROCEDURE — 2060000000 HC ICU INTERMEDIATE R&B

## 2023-07-19 PROCEDURE — 94640 AIRWAY INHALATION TREATMENT: CPT

## 2023-07-19 PROCEDURE — 80053 COMPREHEN METABOLIC PANEL: CPT

## 2023-07-19 PROCEDURE — 2580000003 HC RX 258: Performed by: FAMILY MEDICINE

## 2023-07-19 PROCEDURE — 82962 GLUCOSE BLOOD TEST: CPT

## 2023-07-19 PROCEDURE — 85027 COMPLETE CBC AUTOMATED: CPT

## 2023-07-19 RX ORDER — SORBITOL SOLUTION 70 %
30 SOLUTION, ORAL MISCELLANEOUS ONCE
Status: COMPLETED | OUTPATIENT
Start: 2023-07-19 | End: 2023-07-19

## 2023-07-19 RX ADMIN — LOSARTAN POTASSIUM 100 MG: 50 TABLET, FILM COATED ORAL at 08:40

## 2023-07-19 RX ADMIN — SUCRALFATE 1 G: 1 TABLET ORAL at 21:13

## 2023-07-19 RX ADMIN — PREGABALIN 150 MG: 150 CAPSULE ORAL at 21:13

## 2023-07-19 RX ADMIN — METOPROLOL TARTRATE 25 MG: 25 TABLET, FILM COATED ORAL at 08:39

## 2023-07-19 RX ADMIN — INSULIN GLARGINE 30 UNITS: 100 INJECTION, SOLUTION SUBCUTANEOUS at 08:38

## 2023-07-19 RX ADMIN — SORBITOL SOLUTION (BULK) 30 ML: 70 SOLUTION at 12:32

## 2023-07-19 RX ADMIN — SODIUM CHLORIDE, PRESERVATIVE FREE 5 ML: 5 INJECTION INTRAVENOUS at 08:41

## 2023-07-19 RX ADMIN — INSULIN LISPRO 4 UNITS: 100 INJECTION, SOLUTION INTRAVENOUS; SUBCUTANEOUS at 12:32

## 2023-07-19 RX ADMIN — ASPIRIN 81 MG: 81 TABLET, COATED ORAL at 08:40

## 2023-07-19 RX ADMIN — FAMOTIDINE 20 MG: 20 TABLET ORAL at 08:38

## 2023-07-19 RX ADMIN — FAMOTIDINE 20 MG: 20 TABLET ORAL at 21:13

## 2023-07-19 RX ADMIN — ENOXAPARIN SODIUM 30 MG: 100 INJECTION SUBCUTANEOUS at 21:13

## 2023-07-19 RX ADMIN — TIOTROPIUM BROMIDE INHALATION SPRAY 2 PUFF: 3.12 SPRAY, METERED RESPIRATORY (INHALATION) at 07:50

## 2023-07-19 RX ADMIN — EMPAGLIFLOZIN 10 MG: 10 TABLET, FILM COATED ORAL at 10:10

## 2023-07-19 RX ADMIN — Medication 1000 UNITS: at 08:40

## 2023-07-19 RX ADMIN — INSULIN LISPRO 4 UNITS: 100 INJECTION, SOLUTION INTRAVENOUS; SUBCUTANEOUS at 08:38

## 2023-07-19 RX ADMIN — METOPROLOL TARTRATE 25 MG: 25 TABLET, FILM COATED ORAL at 21:13

## 2023-07-19 RX ADMIN — SODIUM CHLORIDE, PRESERVATIVE FREE 10 ML: 5 INJECTION INTRAVENOUS at 21:22

## 2023-07-19 RX ADMIN — TROSPIUM CHLORIDE 20 MG: 20 TABLET, FILM COATED ORAL at 21:13

## 2023-07-19 RX ADMIN — ENOXAPARIN SODIUM 30 MG: 100 INJECTION SUBCUTANEOUS at 08:45

## 2023-07-19 RX ADMIN — ATORVASTATIN CALCIUM 20 MG: 20 TABLET, FILM COATED ORAL at 08:38

## 2023-07-19 RX ADMIN — SUCRALFATE 1 G: 1 TABLET ORAL at 08:39

## 2023-07-19 RX ADMIN — MONTELUKAST 10 MG: 10 TABLET, FILM COATED ORAL at 18:07

## 2023-07-19 RX ADMIN — ALOGLIPTIN 12.5 MG: 12.5 TABLET, FILM COATED ORAL at 08:38

## 2023-07-19 RX ADMIN — ISOSORBIDE MONONITRATE 30 MG: 30 TABLET, EXTENDED RELEASE ORAL at 08:40

## 2023-07-19 RX ADMIN — SERTRALINE 25 MG: 25 TABLET, FILM COATED ORAL at 21:13

## 2023-07-19 RX ADMIN — ARIPIPRAZOLE 20 MG: 5 TABLET ORAL at 10:10

## 2023-07-19 ASSESSMENT — PAIN SCALES - GENERAL: PAINLEVEL_OUTOF10: 0

## 2023-07-19 NOTE — PROGRESS NOTES
Consulted pharmacy for patient taking famotidine and pantoprazole at same time,. Per Pharmacist D/C pantoprazole, morning dose not given.

## 2023-07-19 NOTE — CARE COORDINATION
Clinical chart reviewed. Patient is planned to have a cardiac cath tomorrow. Discharge plan is to return to [address] [facility],[address]. CM reached out to [address] [facility] at [phone]. Spoke with a nurse there who confirmed patient is able to return. She informed CM patient is mostly independent but uses a rollator in her room and a wheelchair when she leaves her room. She performs her own ADL's. They do assist with medications. DC plan: return to [facility].

## 2023-07-20 LAB
ECHO BSA: 2.13 M2
EKG ATRIAL RATE: 79 BPM
EKG DIAGNOSIS: NORMAL
EKG P AXIS: 51 DEGREES
EKG P-R INTERVAL: 204 MS
EKG Q-T INTERVAL: 378 MS
EKG QRS DURATION: 94 MS
EKG QTC CALCULATION (BAZETT): 433 MS
EKG R AXIS: 8 DEGREES
EKG T AXIS: -6 DEGREES
EKG VENTRICULAR RATE: 79 BPM
GLUCOSE BLD STRIP.AUTO-MCNC: 196 MG/DL (ref 65–100)
GLUCOSE BLD STRIP.AUTO-MCNC: 237 MG/DL (ref 65–100)
GLUCOSE BLD STRIP.AUTO-MCNC: 281 MG/DL (ref 65–100)
PERFORMED BY:: ABNORMAL

## 2023-07-20 PROCEDURE — 94761 N-INVAS EAR/PLS OXIMETRY MLT: CPT

## 2023-07-20 PROCEDURE — C1874 STENT, COATED/COV W/DEL SYS: HCPCS | Performed by: INTERNAL MEDICINE

## 2023-07-20 PROCEDURE — C9600 PERC DRUG-EL COR STENT SING: HCPCS | Performed by: INTERNAL MEDICINE

## 2023-07-20 PROCEDURE — 99153 MOD SED SAME PHYS/QHP EA: CPT | Performed by: INTERNAL MEDICINE

## 2023-07-20 PROCEDURE — 2580000003 HC RX 258: Performed by: INTERNAL MEDICINE

## 2023-07-20 PROCEDURE — 85347 COAGULATION TIME ACTIVATED: CPT

## 2023-07-20 PROCEDURE — 6360000002 HC RX W HCPCS: Performed by: FAMILY MEDICINE

## 2023-07-20 PROCEDURE — C1769 GUIDE WIRE: HCPCS | Performed by: INTERNAL MEDICINE

## 2023-07-20 PROCEDURE — 6370000000 HC RX 637 (ALT 250 FOR IP): Performed by: INTERNAL MEDICINE

## 2023-07-20 PROCEDURE — 2709999900 HC NON-CHARGEABLE SUPPLY: Performed by: INTERNAL MEDICINE

## 2023-07-20 PROCEDURE — 7100000000 HC PACU RECOVERY - FIRST 15 MIN: Performed by: INTERNAL MEDICINE

## 2023-07-20 PROCEDURE — B2111ZZ FLUOROSCOPY OF MULTIPLE CORONARY ARTERIES USING LOW OSMOLAR CONTRAST: ICD-10-PCS | Performed by: INTERNAL MEDICINE

## 2023-07-20 PROCEDURE — 99152 MOD SED SAME PHYS/QHP 5/>YRS: CPT | Performed by: INTERNAL MEDICINE

## 2023-07-20 PROCEDURE — C1725 CATH, TRANSLUMIN NON-LASER: HCPCS | Performed by: INTERNAL MEDICINE

## 2023-07-20 PROCEDURE — 4A023N7 MEASUREMENT OF CARDIAC SAMPLING AND PRESSURE, LEFT HEART, PERCUTANEOUS APPROACH: ICD-10-PCS | Performed by: INTERNAL MEDICINE

## 2023-07-20 PROCEDURE — 2060000000 HC ICU INTERMEDIATE R&B

## 2023-07-20 PROCEDURE — 94640 AIRWAY INHALATION TREATMENT: CPT

## 2023-07-20 PROCEDURE — 6370000000 HC RX 637 (ALT 250 FOR IP): Performed by: FAMILY MEDICINE

## 2023-07-20 PROCEDURE — 2500000003 HC RX 250 WO HCPCS: Performed by: INTERNAL MEDICINE

## 2023-07-20 PROCEDURE — 6360000004 HC RX CONTRAST MEDICATION: Performed by: INTERNAL MEDICINE

## 2023-07-20 PROCEDURE — 027034Z DILATION OF CORONARY ARTERY, ONE ARTERY WITH DRUG-ELUTING INTRALUMINAL DEVICE, PERCUTANEOUS APPROACH: ICD-10-PCS | Performed by: INTERNAL MEDICINE

## 2023-07-20 PROCEDURE — C1887 CATHETER, GUIDING: HCPCS | Performed by: INTERNAL MEDICINE

## 2023-07-20 PROCEDURE — 93005 ELECTROCARDIOGRAM TRACING: CPT | Performed by: INTERNAL MEDICINE

## 2023-07-20 PROCEDURE — 6360000002 HC RX W HCPCS: Performed by: INTERNAL MEDICINE

## 2023-07-20 PROCEDURE — 7100000001 HC PACU RECOVERY - ADDTL 15 MIN: Performed by: INTERNAL MEDICINE

## 2023-07-20 PROCEDURE — 93458 L HRT ARTERY/VENTRICLE ANGIO: CPT | Performed by: INTERNAL MEDICINE

## 2023-07-20 PROCEDURE — C1894 INTRO/SHEATH, NON-LASER: HCPCS | Performed by: INTERNAL MEDICINE

## 2023-07-20 PROCEDURE — 82962 GLUCOSE BLOOD TEST: CPT

## 2023-07-20 DEVICE — STENT ONYXNG27530UX ONYX 2.75X30RX
Type: IMPLANTABLE DEVICE | Site: CORONARY | Status: FUNCTIONAL
Brand: ONYX FRONTIER™

## 2023-07-20 RX ORDER — MIDAZOLAM HYDROCHLORIDE 1 MG/ML
INJECTION INTRAMUSCULAR; INTRAVENOUS PRN
Status: DISCONTINUED | OUTPATIENT
Start: 2023-07-20 | End: 2023-07-20 | Stop reason: HOSPADM

## 2023-07-20 RX ORDER — CLOPIDOGREL BISULFATE 75 MG/1
75 TABLET ORAL DAILY
Status: DISCONTINUED | OUTPATIENT
Start: 2023-07-21 | End: 2023-07-21 | Stop reason: HOSPADM

## 2023-07-20 RX ORDER — ONDANSETRON 2 MG/ML
4 INJECTION INTRAMUSCULAR; INTRAVENOUS EVERY 6 HOURS PRN
Status: DISCONTINUED | OUTPATIENT
Start: 2023-07-20 | End: 2023-07-21 | Stop reason: HOSPADM

## 2023-07-20 RX ORDER — SODIUM CHLORIDE 0.9 % (FLUSH) 0.9 %
5-40 SYRINGE (ML) INJECTION EVERY 12 HOURS SCHEDULED
Status: DISCONTINUED | OUTPATIENT
Start: 2023-07-20 | End: 2023-07-21 | Stop reason: HOSPADM

## 2023-07-20 RX ORDER — FAMOTIDINE 10 MG/ML
INJECTION, SOLUTION INTRAVENOUS PRN
Status: DISCONTINUED | OUTPATIENT
Start: 2023-07-20 | End: 2023-07-20 | Stop reason: HOSPADM

## 2023-07-20 RX ORDER — CLOPIDOGREL BISULFATE 75 MG/1
TABLET ORAL PRN
Status: DISCONTINUED | OUTPATIENT
Start: 2023-07-20 | End: 2023-07-20 | Stop reason: HOSPADM

## 2023-07-20 RX ORDER — HEPARIN SODIUM 1000 [USP'U]/ML
INJECTION, SOLUTION INTRAVENOUS; SUBCUTANEOUS PRN
Status: DISCONTINUED | OUTPATIENT
Start: 2023-07-20 | End: 2023-07-20 | Stop reason: HOSPADM

## 2023-07-20 RX ORDER — SODIUM CHLORIDE 0.9 % (FLUSH) 0.9 %
5-40 SYRINGE (ML) INJECTION PRN
Status: DISCONTINUED | OUTPATIENT
Start: 2023-07-20 | End: 2023-07-21 | Stop reason: HOSPADM

## 2023-07-20 RX ORDER — 0.9 % SODIUM CHLORIDE 0.9 %
INTRAVENOUS SOLUTION INTRAVENOUS CONTINUOUS PRN
Status: COMPLETED | OUTPATIENT
Start: 2023-07-20 | End: 2023-07-20

## 2023-07-20 RX ORDER — ACETAMINOPHEN 325 MG/1
650 TABLET ORAL EVERY 4 HOURS PRN
Status: DISCONTINUED | OUTPATIENT
Start: 2023-07-20 | End: 2023-07-21 | Stop reason: HOSPADM

## 2023-07-20 RX ORDER — NICARDIPINE HYDROCHLORIDE 2.5 MG/ML
INJECTION INTRAVENOUS PRN
Status: DISCONTINUED | OUTPATIENT
Start: 2023-07-20 | End: 2023-07-20 | Stop reason: HOSPADM

## 2023-07-20 RX ORDER — LIDOCAINE HYDROCHLORIDE 10 MG/ML
INJECTION, SOLUTION INFILTRATION; PERINEURAL PRN
Status: DISCONTINUED | OUTPATIENT
Start: 2023-07-20 | End: 2023-07-20 | Stop reason: HOSPADM

## 2023-07-20 RX ORDER — SODIUM CHLORIDE 9 MG/ML
INJECTION, SOLUTION INTRAVENOUS PRN
Status: DISCONTINUED | OUTPATIENT
Start: 2023-07-20 | End: 2023-07-21 | Stop reason: HOSPADM

## 2023-07-20 RX ORDER — FENTANYL CITRATE 50 UG/ML
INJECTION, SOLUTION INTRAMUSCULAR; INTRAVENOUS PRN
Status: DISCONTINUED | OUTPATIENT
Start: 2023-07-20 | End: 2023-07-20 | Stop reason: HOSPADM

## 2023-07-20 RX ADMIN — ATORVASTATIN CALCIUM 20 MG: 20 TABLET, FILM COATED ORAL at 08:37

## 2023-07-20 RX ADMIN — SERTRALINE 25 MG: 25 TABLET, FILM COATED ORAL at 20:47

## 2023-07-20 RX ADMIN — TROSPIUM CHLORIDE 20 MG: 20 TABLET, FILM COATED ORAL at 20:48

## 2023-07-20 RX ADMIN — Medication 1000 UNITS: at 08:36

## 2023-07-20 RX ADMIN — SUCRALFATE 1 G: 1 TABLET ORAL at 20:47

## 2023-07-20 RX ADMIN — INSULIN GLARGINE 30 UNITS: 100 INJECTION, SOLUTION SUBCUTANEOUS at 21:34

## 2023-07-20 RX ADMIN — INSULIN GLARGINE 30 UNITS: 100 INJECTION, SOLUTION SUBCUTANEOUS at 08:37

## 2023-07-20 RX ADMIN — INSULIN LISPRO 8 UNITS: 100 INJECTION, SOLUTION INTRAVENOUS; SUBCUTANEOUS at 17:01

## 2023-07-20 RX ADMIN — TIOTROPIUM BROMIDE INHALATION SPRAY 2 PUFF: 3.12 SPRAY, METERED RESPIRATORY (INHALATION) at 08:54

## 2023-07-20 RX ADMIN — FAMOTIDINE 20 MG: 20 TABLET ORAL at 20:47

## 2023-07-20 RX ADMIN — ARIPIPRAZOLE 20 MG: 5 TABLET ORAL at 08:35

## 2023-07-20 RX ADMIN — CYCLOBENZAPRINE 10 MG: 10 TABLET, FILM COATED ORAL at 21:38

## 2023-07-20 RX ADMIN — ENOXAPARIN SODIUM 30 MG: 100 INJECTION SUBCUTANEOUS at 20:46

## 2023-07-20 RX ADMIN — ACETAMINOPHEN 650 MG: 325 TABLET ORAL at 20:47

## 2023-07-20 RX ADMIN — MONTELUKAST 10 MG: 10 TABLET, FILM COATED ORAL at 17:05

## 2023-07-20 RX ADMIN — ISOSORBIDE MONONITRATE 30 MG: 30 TABLET, EXTENDED RELEASE ORAL at 08:36

## 2023-07-20 RX ADMIN — ALOGLIPTIN 12.5 MG: 12.5 TABLET, FILM COATED ORAL at 08:36

## 2023-07-20 RX ADMIN — METOPROLOL TARTRATE 25 MG: 25 TABLET, FILM COATED ORAL at 08:36

## 2023-07-20 RX ADMIN — METOPROLOL TARTRATE 25 MG: 25 TABLET, FILM COATED ORAL at 20:47

## 2023-07-20 RX ADMIN — PREGABALIN 150 MG: 150 CAPSULE ORAL at 20:48

## 2023-07-20 RX ADMIN — EMPAGLIFLOZIN 10 MG: 10 TABLET, FILM COATED ORAL at 08:36

## 2023-07-20 RX ADMIN — ASPIRIN 81 MG: 81 TABLET, COATED ORAL at 08:37

## 2023-07-20 RX ADMIN — SODIUM CHLORIDE, PRESERVATIVE FREE 10 ML: 5 INJECTION INTRAVENOUS at 21:01

## 2023-07-20 RX ADMIN — LOSARTAN POTASSIUM 100 MG: 50 TABLET, FILM COATED ORAL at 08:37

## 2023-07-20 ASSESSMENT — PAIN DESCRIPTION - LOCATION
LOCATION: HEAD
LOCATION: CHEST

## 2023-07-20 ASSESSMENT — PAIN DESCRIPTION - DESCRIPTORS: DESCRIPTORS: ACHING

## 2023-07-20 ASSESSMENT — PAIN SCALES - GENERAL
PAINLEVEL_OUTOF10: 0
PAINLEVEL_OUTOF10: 0
PAINLEVEL_OUTOF10: 3

## 2023-07-20 NOTE — DISCHARGE INSTRUCTIONS
IMPORTANT    People who undergo angioplasty and/or stent placement procedures are prescribed aspirin along with certain medications called anticlotting or antiplatelet medications. These medications include: Plavix (clopidogrel), Effient (prasugrel), and Brilinta (ticagrelor). It is important to take the aspirin and the anticlotting medication that you were prescribed every day in order to reduce the probability that the stent will become filled with blood clot. Angioplasty and/or stent placement procedures are done so that a blocked artery can be opened. If the stent becomes filled with blood clot, the artery becomes blocked off again. This can result in a heart attack, or even death. It is extremely important to take all the medicines prescribed by your cardiologist exactly as they were prescribed. Failure to take certain medications - particularly aspirin along with Plavix (clopidogrel),  Effient (prasugrel), or Brilinta(ticagrelor) - can result in a heart attack, or even death. Do not miss any doses. It is vital that aspirin along with Plavix, Effient, or Brilinta be taken every single day. If you have any questions or concerns regarding your medications, please consult your cardiologist. He/she is the only person who can adjust or stop these medications. You must fill the prescription for these medications immediately upon discharge so that you do not miss any doses. If you cannot afford the medication prescribed to you, please let your cardiologist know immediately.

## 2023-07-20 NOTE — PROGRESS NOTES
Dr. Mckeon Jacinto contacted for med hold prior to procedure. Orders to hold Lantus and give Lovenox tonight.

## 2023-07-20 NOTE — PROGRESS NOTES
Phase 2 outpatient cardiac rehab referral is not appropriate for this patient at this time due to physical and/or mental limitations, diagnosis and/or treatment plans (planned staged PCI). Printed teaching materials explaining the importance of aspirin and antiplatelet compliance will be included with the patient's printed discharge instructions.

## 2023-07-20 NOTE — PROGRESS NOTES
General Daily Progress Note      Patient Name:   Scottie Rodriguez       YOB: 1945       Age:  68 y.o. Admit Date: 7/17/2023      Subjective:   Patient is a 68y.o. year old female with PMHx of CAD, hypertension, hyperlipidemia, PVD,with stent  DM who was admitted to the hospital due to chest discomfort and abnormal EKG today. Patient presented to the hospital for a pre-op evaluation for her cath procedure on July 20th, but her EKG showed AV node dysfunction and was referred by her cardiologist to get admitted to the hospital. Patient experiences headache, dizziness, shortness of breath, productive cough, nausea, but denies chest pain, palpitation, vomiting and abdominal pain. ED work up:   /91  Glucose 155  BUN 23  Creatinine 1.09   BUN/Cr ratio 21   Magnesium 2.3    Troponin 6      Chest x-ray - normal      EKG - possible AV node dysfunction     7/18  Patient is lying comfortably on the bed. She still experiences SOB, cough, dizziness, but denies nausea, vomiting, or abdominal pain. Glucose 197  BUN 26  Creatinine 1.05  BUN/Cr 25   GFR 55    EKG - Normal sinus rhythm     7/19  Patient is resting without any complaints. She still experiences cough, but denies chest pain, shortness of breath, nausea, vomiting, or abdominal pain. Peripheral edema is improved. Patient is still scheduled for cath lab tomorrow 7/20. Glucose 225   NT pro-BNP 48   BUN 24   Cr 1.04  BUN/Cr 23   GFR 55   Hgb A1c 9.3%     Cardiology consult      -Continue ASA, Imdur and statin     -Continue to hold BB      -Abnormal initial EKG reveal AV node dysfunction      -Cath scheduled on 7/20 7/20  Patient was in cardiac cath lab this morning.       /62  Glucose 196   Albumin 3.2  BNP 48     Cardiac Cath:     Status post PCI mid LAD 90% calcific lesion with single drug eluting stent    Outpatient staged PCI distal circumflex 90% lesion    Continue DAPT lifelong    Right radial SubCUTAneous, BID, Susie Gonzales MD, 30 Units at 07/20/23 8685    isosorbide mononitrate (IMDUR) extended release tablet 30 mg, 30 mg, Oral, Daily, Faustino Jorge MD, 30 mg at 07/20/23 0836    metoprolol tartrate (LOPRESSOR) tablet 25 mg, 25 mg, Oral, BID, Faustino Jorge MD, 25 mg at 07/20/23 0836    pregabalin (LYRICA) capsule 150 mg, 150 mg, Oral, Nightly, Faustino Jorge MD, 150 mg at 07/19/23 2113    sertraline (ZOLOFT) tablet 25 mg, 25 mg, Oral, QHS, Faustino Jorge MD, 25 mg at 07/19/23 2113    alogliptin (NESINA) tablet 12.5 mg, 12.5 mg, Oral, Daily, Faustino Jorge MD, 12.5 mg at 07/20/23 0836    sucralfate (CARAFATE) tablet 1 g, 1 g, Oral, BID, Susie Gonzales MD, 1 g at 07/19/23 2113    Vitamin D (CHOLECALCIFEROL) tablet 1,000 Units, 1,000 Units, Oral, Daily, Susie Gonzales MD, 1,000 Units at 07/20/23 0836    ARIPiprazole (ABILIFY) tablet 20 mg, 20 mg, Oral, Daily, Faustino Jorge MD, 20 mg at 07/20/23 0895    aspirin EC tablet 81 mg, 81 mg, Oral, Daily, Faustino Jorge MD, 81 mg at 07/20/23 5417    atorvastatin (LIPITOR) tablet 20 mg, 20 mg, Oral, Daily, Faustino Jorge MD, 20 mg at 07/20/23 8551    cyclobenzaprine (FLEXERIL) tablet 10 mg, 10 mg, Oral, TID PRN, Celine Jorge MD, 10 mg at 07/18/23 1314    losartan (COZAAR) tablet 100 mg, 100 mg, Oral, Daily, Faustino Jorge MD, 100 mg at 07/20/23 0837    montelukast (SINGULAIR) tablet 10 mg, 10 mg, Oral, QPM, Faustino Jorge MD, 10 mg at 07/19/23 1807    nitroGLYCERIN (NITROSTAT) SL tablet 0.4 mg, 0.4 mg, SubLINGual, Q5 Min PRN, Celine Jorge MD    trospium (SANCTURA) tablet 20 mg, 20 mg, Oral, Nightly, Faustino Jorge MD, 20 mg at 07/19/23 2113    tiotropium (SPIRIVA RESPIMAT) 2.5 MCG/ACT inhaler 2 puff, 2 puff, Inhalation, Daily, Faustino Jorge MD, 2 puff at 07/20/23 0854    insulin lispro (HUMALOG) injection vial 0-16 Units, 0-16 Units, SubCUTAneous, TID WC, Susie Gonzales MD, 4 Units at 07/19/23 1232    insulin lispro

## 2023-07-20 NOTE — PROGRESS NOTES
S/P PCI mid LAD 90% diffuse calcific lesion  Oupt staged PCI Lcx 90% distal lesion  Right radial approach

## 2023-07-20 NOTE — PROGRESS NOTES
CARDIOLOGY PROGRESS NOTE      Patient Name: Javi Momin  Age: 68 y.o. Gender:female  :1945  MRN: [id number]    Patient seen and examined. This is a patient with a history of HTN, HLD, PAF (unclear history), PVD, DM, CAD who presented with abnormal ECG that showed AV node dysfunction now being followed for chest pain. She has been having ongoing chest pain and shortness of breath for months, along with dizziness. S/p PCI to mid LAD 90% calcific lesion with single drug eluting stent. Patient resting comfortable in bed. Denies chest pain, palpitation, SOB or dizziness. No other complaints reported. Telemetry reviewed, there were no events noted in the past 24 hours. SR, 70's. Pertinent review of systems items noted above, all other systems are negative. Current medications reviewed. Physical Examination    Allergies   Allergen Reactions    Azithromycin Other (See Comments)     hallucinations    Diphenhydramine Other (See Comments)     hallucinations  hallucinations      Quetiapine Other (See Comments)     Hallucinations.   hallucinations      Iodine Itching    Sulfa Antibiotics Itching    Tomato Itching     Tomato sauce     Erythromycin Other (See Comments)     halllucinations    Milk-Related Compounds      Vitals:    23 1045   BP: 130/75   Pulse: 78   Resp: 15   Temp:    SpO2: 96%     Vital signs are stable  General:  Alert, in NAD  Cardiovascular:  RRR, no murmur  Respiratory:  Lungs are clear  Abdomen:  soft, nontender  Extremities: trace lower extremity edema  Skin:  warm  Psych:  Normal affect    Labs reviewed:  Recent Results (from the past 12 hour(s))   POCT Glucose    Collection Time: 23  7:58 AM   Result Value Ref Range    POC Glucose 196 (H) 65 - 100 mg/dL    Performed by: Venia Flax    Cardiac procedure    Collection Time: 23 10:17 AM   Result Value Ref Range    Body Surface Area 2.13 m2        Case discussed with Dr. Olman Horvath and our impression and recommendations are as follows: Abnormal ECG on admission, appeared to have some sort of AV node dysfunction on initial ECG in PAT, remained SR overnight. Troponin negative x 2, TSH normal, . Continue tele monitoring   Echo 5/23 with EF 60-65%, grade one diastolic dysfunction  Recent monitor worn for 17 days in office with sinus rhythm, rare ectopy and no significant arrhythmia  Continue to hold BB   Coronary artery disease: Chest pain free  S/p PCI mid LAD 90% calcific lesion with single drug eluting stent  Outpatient staged PCI distal circumflex 90% lesion in the near future   Continue DAPT lifelong  Continue HI statin  Hypertension, blood pressure acceptable. a. Continue losartan 100 mg daily by primary              b . Continue Imdur   Hyperlipidemia, continue statin as above   Diabetes, per primary     Please do not hesitate to call if additional questions arise.     Evy Delgado, ROBLES - CNP  7/20/2023

## 2023-07-20 NOTE — PROGRESS NOTES
General Daily Progress Note      Patient Name:   Luis Fernando Valentino       YOB: 1945       Age:  68 y.o. Admit Date: 7/17/2023      Subjective:   Patient is a 68y.o. year old female with PMHx of CAD, hypertension, hyperlipidemia, PVD,with stent  DM who was admitted to the hospital due to chest discomfort and abnormal EKG today. Patient presented to the hospital for a pre-op evaluation for her cath procedure on July 20th, but her EKG showed AV node dysfunction and was referred by her cardiologist to get admitted to the hospital. Patient experiences headache, dizziness, shortness of breath, productive cough, nausea, but denies chest pain, palpitation, vomiting and abdominal pain. ED work up:   /91  Glucose 155  BUN 23  Creatinine 1.09   BUN/Cr ratio 21   Magnesium 2.3    Troponin 6      Chest x-ray - normal      EKG - possible AV node dysfunction     7/18  Patient is lying comfortably on the bed. She still experiences SOB, cough, dizziness, but denies nausea, vomiting, or abdominal pain. Glucose 197  BUN 26  Creatinine 1.05  BUN/Cr 25   GFR 55    EKG - Normal sinus rhythm     7/19  Patient is resting without any complaints. She still experiences cough, but denies chest pain, shortness of breath, nausea, vomiting, or abdominal pain. Peripheral edema is improved. Patient is still scheduled for cath lab tomorrow 7/20. Glucose 225   NT pro-BNP 48   BUN 24   Cr 1.04  BUN/Cr 23   GFR 55   Hgb A1c 9.3%     Cardiology consult      -Continue ASA, Imdur and statin     -Continue to hold BB      -Abnormal initial EKG reveal AV node dysfunction      -Cath scheduled on 7/20 7/20  Patient was in cardiac cath lab this morning.       /62  Glucose 196   Albumin 3.2  BNP 48     Cardiac Cath:     Status post PCI mid LAD 90% calcific lesion with single drug eluting stent    Outpatient staged PCI distal circumflex 90% lesion    Continue DAPT lifelong    Right radial tablet 12.5 mg, 12.5 mg, Oral, Daily, Robert Young MD, 12.5 mg at 07/19/23 0838    sucralfate (CARAFATE) tablet 1 g, 1 g, Oral, BID, Faustino Jorge MD, 1 g at 07/19/23 2113    Vitamin D (CHOLECALCIFEROL) tablet 1,000 Units, 1,000 Units, Oral, Daily, Robert Young MD, 1,000 Units at 07/19/23 0840    ARIPiprazole (ABILIFY) tablet 20 mg, 20 mg, Oral, Daily, Faustino Jorge MD, 20 mg at 07/19/23 1010    aspirin EC tablet 81 mg, 81 mg, Oral, Daily, Titus Jorge MD, 81 mg at 07/19/23 0840    atorvastatin (LIPITOR) tablet 20 mg, 20 mg, Oral, Daily, Titus Jorge MD, 20 mg at 07/19/23 2882    cyclobenzaprine (FLEXERIL) tablet 10 mg, 10 mg, Oral, TID PRN, Robert Young MD, 10 mg at 07/18/23 1314    losartan (COZAAR) tablet 100 mg, 100 mg, Oral, Daily, Titus Jorge MD, 100 mg at 07/19/23 0840    montelukast (SINGULAIR) tablet 10 mg, 10 mg, Oral, QPM, Robert Young MD, 10 mg at 07/19/23 1807    nitroGLYCERIN (NITROSTAT) SL tablet 0.4 mg, 0.4 mg, SubLINGual, Q5 Min PRN, Robert Young MD    [facility]) tablet 20 mg, 20 mg, Oral, Nightly, Faustino Jorge MD, 20 mg at 07/19/23 2113    tiotropium (SPIRIVA RESPIMAT) 2.5 MCG/ACT inhaler 2 puff, 2 puff, Inhalation, Daily, Titus Jorge MD, 2 puff at 07/19/23 0750    insulin lispro (HUMALOG) injection vial 0-16 Units, 0-16 Units, SubCUTAneous, TID WC, Faustino Jorge MD, 4 Units at 07/19/23 1232    insulin lispro (HUMALOG) injection vial 0-4 Units, 0-4 Units, SubCUTAneous, Nightly, Faustino Jorge MD    glucose chewable tablet 16 g, 4 tablet, Oral, PRN, Titus Jorge MD    dextrose bolus 10% 125 mL, 125 mL, IntraVENous, PRN **OR** dextrose bolus 10% 250 mL, 250 mL, IntraVENous, PRN, Titus Jorge MD    glucagon injection 1 mg, 1 mg, SubCUTAneous, PRN, Titus Jorge MD    dextrose 10 % infusion, , IntraVENous, Continuous PRN, Titus Jorge MD    sodium chloride flush 0.9 % injection 5-40 mL, 5-40 mL, IntraVENous, 2 times per day,

## 2023-07-20 NOTE — PROGRESS NOTES
CM reviewed medial record. Patient returned from cardiac cath with PCI. DCP is back to [facility] in 24 hours with cardiology clearance.

## 2023-07-21 VITALS
RESPIRATION RATE: 20 BRPM | WEIGHT: 240.3 LBS | HEIGHT: 58 IN | HEART RATE: 98 BPM | TEMPERATURE: 97.3 F | OXYGEN SATURATION: 99 % | SYSTOLIC BLOOD PRESSURE: 114 MMHG | BODY MASS INDEX: 50.44 KG/M2 | DIASTOLIC BLOOD PRESSURE: 69 MMHG

## 2023-07-21 PROBLEM — I45.9 HEART BLOCK: Status: RESOLVED | Noted: 2023-07-17 | Resolved: 2023-07-21

## 2023-07-21 PROBLEM — I49.9 ARRHYTHMIA: Status: RESOLVED | Noted: 2023-07-17 | Resolved: 2023-07-21

## 2023-07-21 LAB
ANION GAP SERPL CALC-SCNC: 5 MMOL/L (ref 5–15)
BUN SERPL-MCNC: 30 MG/DL (ref 6–20)
BUN/CREAT SERPL: 28 (ref 12–20)
CA-I BLD-MCNC: 9.4 MG/DL (ref 8.5–10.1)
CHLORIDE SERPL-SCNC: 107 MMOL/L (ref 97–108)
CHOLEST SERPL-MCNC: 139 MG/DL
CO2 SERPL-SCNC: 26 MMOL/L (ref 21–32)
CREAT SERPL-MCNC: 1.06 MG/DL (ref 0.55–1.02)
EKG ATRIAL RATE: 87 BPM
EKG DIAGNOSIS: NORMAL
EKG P AXIS: 46 DEGREES
EKG P-R INTERVAL: 192 MS
EKG Q-T INTERVAL: 366 MS
EKG QRS DURATION: 92 MS
EKG QTC CALCULATION (BAZETT): 440 MS
EKG R AXIS: 5 DEGREES
EKG T AXIS: -54 DEGREES
EKG VENTRICULAR RATE: 87 BPM
ERYTHROCYTE [DISTWIDTH] IN BLOOD BY AUTOMATED COUNT: 13.2 % (ref 11.5–14.5)
GLUCOSE BLD STRIP.AUTO-MCNC: 156 MG/DL (ref 65–100)
GLUCOSE BLD STRIP.AUTO-MCNC: 234 MG/DL (ref 65–100)
GLUCOSE SERPL-MCNC: 165 MG/DL (ref 65–100)
HCT VFR BLD AUTO: 44 % (ref 35–47)
HDLC SERPL-MCNC: 60 MG/DL
HDLC SERPL: 2.3 (ref 0–5)
HGB BLD-MCNC: 13.6 G/DL (ref 11.5–16)
LDLC SERPL CALC-MCNC: 69 MG/DL (ref 0–100)
LIPID PANEL: NORMAL
MCH RBC QN AUTO: 28.8 PG (ref 26–34)
MCHC RBC AUTO-ENTMCNC: 30.9 G/DL (ref 30–36.5)
MCV RBC AUTO: 93.2 FL (ref 80–99)
NRBC # BLD: 0 K/UL (ref 0–0.01)
NRBC BLD-RTO: 0 PER 100 WBC
PERFORMED BY:: ABNORMAL
PERFORMED BY:: ABNORMAL
PLATELET # BLD AUTO: 194 K/UL (ref 150–400)
PMV BLD AUTO: 10.7 FL (ref 8.9–12.9)
POTASSIUM SERPL-SCNC: 4.4 MMOL/L (ref 3.5–5.1)
RBC # BLD AUTO: 4.72 M/UL (ref 3.8–5.2)
SODIUM SERPL-SCNC: 138 MMOL/L (ref 136–145)
TRIGL SERPL-MCNC: 50 MG/DL
TROPONIN I SERPL HS-MCNC: 30 NG/L (ref 0–51)
VLDLC SERPL CALC-MCNC: 10 MG/DL
WBC # BLD AUTO: 10.8 K/UL (ref 3.6–11)

## 2023-07-21 PROCEDURE — 80061 LIPID PANEL: CPT

## 2023-07-21 PROCEDURE — 82962 GLUCOSE BLOOD TEST: CPT

## 2023-07-21 PROCEDURE — 6370000000 HC RX 637 (ALT 250 FOR IP): Performed by: INTERNAL MEDICINE

## 2023-07-21 PROCEDURE — 97161 PT EVAL LOW COMPLEX 20 MIN: CPT

## 2023-07-21 PROCEDURE — 97530 THERAPEUTIC ACTIVITIES: CPT

## 2023-07-21 PROCEDURE — 94761 N-INVAS EAR/PLS OXIMETRY MLT: CPT

## 2023-07-21 PROCEDURE — 94640 AIRWAY INHALATION TREATMENT: CPT

## 2023-07-21 PROCEDURE — 80048 BASIC METABOLIC PNL TOTAL CA: CPT

## 2023-07-21 PROCEDURE — 84484 ASSAY OF TROPONIN QUANT: CPT

## 2023-07-21 PROCEDURE — 93005 ELECTROCARDIOGRAM TRACING: CPT | Performed by: INTERNAL MEDICINE

## 2023-07-21 PROCEDURE — 97165 OT EVAL LOW COMPLEX 30 MIN: CPT

## 2023-07-21 PROCEDURE — 6370000000 HC RX 637 (ALT 250 FOR IP): Performed by: FAMILY MEDICINE

## 2023-07-21 PROCEDURE — 2580000003 HC RX 258: Performed by: INTERNAL MEDICINE

## 2023-07-21 PROCEDURE — 6360000002 HC RX W HCPCS: Performed by: FAMILY MEDICINE

## 2023-07-21 PROCEDURE — 85027 COMPLETE CBC AUTOMATED: CPT

## 2023-07-21 PROCEDURE — 36415 COLL VENOUS BLD VENIPUNCTURE: CPT

## 2023-07-21 RX ORDER — CLOPIDOGREL BISULFATE 75 MG/1
75 TABLET ORAL DAILY
Qty: 30 TABLET | Refills: 3 | Status: SHIPPED | OUTPATIENT
Start: 2023-07-22

## 2023-07-21 RX ADMIN — CLOPIDOGREL BISULFATE 75 MG: 75 TABLET ORAL at 08:28

## 2023-07-21 RX ADMIN — METOPROLOL TARTRATE 25 MG: 25 TABLET, FILM COATED ORAL at 08:28

## 2023-07-21 RX ADMIN — SUCRALFATE 1 G: 1 TABLET ORAL at 08:26

## 2023-07-21 RX ADMIN — TIOTROPIUM BROMIDE INHALATION SPRAY 2 PUFF: 3.12 SPRAY, METERED RESPIRATORY (INHALATION) at 07:57

## 2023-07-21 RX ADMIN — LOSARTAN POTASSIUM 100 MG: 50 TABLET, FILM COATED ORAL at 08:27

## 2023-07-21 RX ADMIN — ALOGLIPTIN 12.5 MG: 12.5 TABLET, FILM COATED ORAL at 08:26

## 2023-07-21 RX ADMIN — FAMOTIDINE 20 MG: 20 TABLET ORAL at 08:27

## 2023-07-21 RX ADMIN — EMPAGLIFLOZIN 10 MG: 10 TABLET, FILM COATED ORAL at 08:29

## 2023-07-21 RX ADMIN — INSULIN GLARGINE 30 UNITS: 100 INJECTION, SOLUTION SUBCUTANEOUS at 08:29

## 2023-07-21 RX ADMIN — Medication 1000 UNITS: at 08:26

## 2023-07-21 RX ADMIN — ENOXAPARIN SODIUM 30 MG: 100 INJECTION SUBCUTANEOUS at 08:26

## 2023-07-21 RX ADMIN — ASPIRIN 81 MG: 81 TABLET, COATED ORAL at 08:28

## 2023-07-21 RX ADMIN — ISOSORBIDE MONONITRATE 30 MG: 30 TABLET, EXTENDED RELEASE ORAL at 08:27

## 2023-07-21 RX ADMIN — SODIUM CHLORIDE, PRESERVATIVE FREE 5 ML: 5 INJECTION INTRAVENOUS at 08:29

## 2023-07-21 RX ADMIN — ARIPIPRAZOLE 20 MG: 5 TABLET ORAL at 08:27

## 2023-07-21 RX ADMIN — ATORVASTATIN CALCIUM 20 MG: 20 TABLET, FILM COATED ORAL at 08:28

## 2023-07-21 NOTE — PROGRESS NOTES
CARDIOLOGY PROGRESS NOTE      Patient Name: Denys Zhao  Age: 68 y.o. Gender:female  :1945  MRN: [id number]    Patient seen and examined. This is a patient with a history of HTN, HLD, PAF (unclear history), PVD, DM, CAD who presented with abnormal ECG that showed AV node dysfunction. S/p PCI (23) to mid LAD 90% calcific lesion with single drug eluting stent. She is resting comfortably in bed. No chest pain or palpitations. No SOB or dizziness. Voiced she is feeling good and ready to go home. No other complaints reported. Telemetry reviewed, there were no events noted in the past 24 hours. SR, rate 90's. Pertinent review of systems items noted above, all other systems are negative. Current medications reviewed. Physical Examination    Allergies   Allergen Reactions    Azithromycin Other (See Comments)     hallucinations    Diphenhydramine Other (See Comments)     hallucinations  hallucinations      Quetiapine Other (See Comments)     Hallucinations. hallucinations      Iodine Itching    Sulfa Antibiotics Itching    Tomato Itching     Tomato sauce     Erythromycin Other (See Comments)     halllucinations    Milk-Related Compounds      Vitals:    23 0815   BP: 124/64   Pulse:    Resp:    Temp:    SpO2:      Vital signs are stable  General:  Alert, in NAD  Cardiovascular:  RRR, no murmur  Respiratory:  Lungs are clear  Abdomen:  soft, nontender  Extremities: trace lower extremity edema. Right radial access site with no hematoma or swelling. +2 radial pulse.    Skin:  warm  Psych:  Normal affect    Labs reviewed:  Recent Results (from the past 12 hour(s))   CBC    Collection Time: 23  4:26 AM   Result Value Ref Range    WBC 10.8 3.6 - 11.0 K/uL    RBC 4.72 3.80 - 5.20 M/uL    Hemoglobin 13.6 11.5 - 16.0 g/dL    Hematocrit 44.0 35.0 - 47.0 %    MCV 93.2 80.0 - 99.0 FL    MCH 28.8 26.0 - 34.0 PG    MCHC 30.9 30.0 - 36.5 g/dL    RDW 13.2 11.5 - 14.5 %    Platelets 224 089 - 236 K/uL

## 2023-07-21 NOTE — PROGRESS NOTES
Transition of Care Plan:    RUR: 16%  Prior Level of Functioning: n/a  Disposition: independent  If SNF or IPR: Date FOC offered: n/a  Date FOC received: n/a  Accepting facility: n/a  Date authorization started with reference number: n/a  Date authorization received and expires: n/a  Follow up appointments: yes  DME needed: n/a  Transportation at discharge: daughter  IM/IMM Medicare/ letter given: yes  Is patient a [employer] and connected with [facility]? yes   If yes, was [employer] transfer form completed and [facility] notified? Caregiver Contact: n/a  Discharge Caregiver contacted prior to discharge?  N/a  Care Conference needed? no  Barriers to discharge: no

## 2023-07-21 NOTE — PROGRESS NOTES
General Daily Progress Note      Patient Name:   Khushboo Bell       YOB: 1945       Age:  68 y.o. Admit Date: 7/17/2023      Subjective:   Patient is a 68y.o. year old female with PMHx of CAD, hypertension, hyperlipidemia, PVD,with stent  DM who was admitted to the hospital due to chest discomfort and abnormal EKG today. Patient presented to the hospital for a pre-op evaluation for her cath procedure on July 20th, but her EKG showed AV node dysfunction and was referred by her cardiologist to get admitted to the hospital. Patient experiences headache, dizziness, shortness of breath, productive cough, nausea, but denies chest pain, palpitation, vomiting and abdominal pain. ED work up:   /91  Glucose 155  BUN 23  Creatinine 1.09   BUN/Cr ratio 21   Magnesium 2.3    Troponin 6      Chest x-ray - normal      EKG - possible AV node dysfunction     7/18  Patient is lying comfortably on the bed. She still experiences SOB, cough, dizziness, but denies nausea, vomiting, or abdominal pain. Glucose 197  BUN 26  Creatinine 1.05  BUN/Cr 25   GFR 55    EKG - Normal sinus rhythm     7/19  Patient is resting without any complaints. She still experiences cough, but denies chest pain, shortness of breath, nausea, vomiting, or abdominal pain. Peripheral edema is improved. Patient is still scheduled for cath lab tomorrow 7/20. Glucose 225   NT pro-BNP 48   BUN 24   Cr 1.04  BUN/Cr 23   GFR 55   Hgb A1c 9.3%     Cardiology consult      -Continue ASA, Imdur and statin     -Continue to hold BB      -Abnormal initial EKG reveal AV node dysfunction      -Cath scheduled on 7/20 7/20  Patient was in cardiac cath lab this morning.       /62  Glucose 196   Albumin 3.2  BNP 48     Cardiac Cath:     Status post PCI mid LAD 90% calcific lesion with single drug eluting stent    Outpatient staged PCI distal circumflex 90% lesion    Continue DAPT lifelong    Right radial

## 2023-07-21 NOTE — DISCHARGE SUMMARY
leads  Confirmed by Tila Gonsalves ([id number]) on 7/21/2023 11:06:32 AM     Troponin    Collection Time: 07/21/23  9:48 AM   Result Value Ref Range    Troponin, High Sensitivity 30 0 - 51 ng/L   POCT Glucose    Collection Time: 07/21/23 11:05 AM   Result Value Ref Range    POC Glucose 234 (H) 65 - 100 mg/dL    Performed by: Crista Pedraza       No results found. HOSPITAL COURSE:  Patient is a 68y.o. year old female with PMHx of CAD, hypertension, hyperlipidemia, PVD,with stent  DM who was admitted to the hospital due to chest discomfort and abnormal EKG today. Patient presented to the hospital for a pre-op evaluation for her cath procedure on July 20th, but her EKG showed AV node dysfunction and was referred by her cardiologist to get admitted to the hospital. Patient experiences headache, dizziness, shortness of breath, productive cough, nausea, but denies chest pain, palpitation, vomiting and abdominal pain. ED work up:   /91  Glucose 155  BUN 23  Creatinine 1.09   BUN/Cr ratio 21   Magnesium 2.3    Troponin 6      Chest x-ray - normal      EKG - possible AV node dysfunction      7/18  Patient is lying comfortably on the bed. She still experiences SOB, cough, dizziness, but denies nausea, vomiting, or abdominal pain. Glucose 197  BUN 26  Creatinine 1.05  BUN/Cr 25   GFR 55     EKG - Normal sinus rhythm      7/19  Patient is resting without any complaints. She still experiences cough, but denies chest pain, shortness of breath, nausea, vomiting, or abdominal pain. Peripheral edema is improved. Patient is still scheduled for cath lab tomorrow 7/20. Glucose 225   NT pro-BNP 48   BUN 24   Cr 1.04  BUN/Cr 23   GFR 55   Hgb A1c 9.3%      Cardiology consult      -Continue ASA, Imdur and statin     -Continue to hold BB      -Abnormal initial EKG reveal AV node dysfunction      -Cath scheduled on 7/20 7/20  Patient was in cardiac cath lab this morning.        /62  Glucose 196 Albumin 3.2  BNP 48      Cardiac Cath:     Status post PCI mid LAD 90% calcific lesion with single drug eluting stent    Outpatient staged PCI distal circumflex 90% lesion    Continue DAPT lifelong    Right radial approach     7/21  Patient is lying comfortably in the bed without any new complaints. Patient had Cath lab done yesterday without any complication. Patient denies chest pain, shortness of breath, nausea, vomiting, or abdominal pain.       Glucose 156  GFR 54     Patient have seen by the cardiology cardiac abdomen shows    S/p PCI mid LAD 90% calcific lesion with single drug eluting stent  Outpatient staged PCI distal circumflex 90% lesion in the near future    Echo 5/23 with EF 60-65%, grade one diastolic dysfunction  Continue to hold BB until EP follow-up as an OP    Per the cardiology patient discharged home in stable condition      Signed:   Sanjeev Adame MD  7/21/2023  12:15 PM

## 2023-07-21 NOTE — PLAN OF CARE
OCCUPATIONAL THERAPY EVALUATION  Patient: Yoly Nick (68 y.o. female)  Date: 7/21/2023  Primary Diagnosis: Heart block [I45.9]  Arrhythmia [I49.9]  Procedure(s) (LRB):  Left heart cath / coronary angiography (N/A)  Percutaneous coronary intervention (N/A)  Angioplasty coronary (N/A)  Insert stent hao coronary (N/A) 1 Day Post-Op   Precautions: Fall Risk                In place during session:External Catheter and EKG/telemetry     ASSESSMENT  Pt is a 68 y.o. female presenting to [facility] with c/o chest pain, admitted 7/17/23 and currently being treated for AV node dysfunction, chest pain, IVON, HTN, hyperlipidemia, and diabetes. Pt received semi-supine in bed upon arrival, AXO x4, and agreeable to OT evaluation. Based on current observations, pt presents with decreased  functional mobility, independence in ADLs, strength, body mechanics, activity tolerance, endurance, safety awareness, balance (see below for objective details and assist levels). Overall, pt tolerates session fair with c/o dizziness with activity. Socks donned to BLE with total A while semi supine in bed. Bed mobility completed with min A and additional time overall. Pt c/o dizziness with initially sitting EOB and appeared SOB with activity so pt educated on pacing activity to reduce overall fatigue. Sit<>stand transfers, functional mobility to and from the bathroom, and toileting routine completed with SBA and additional time overall. Pt educated on pushing from bed during sit>stand transfer rather than pulling on rollator. Pt req'd VC to use grab bar during toilet transfer with pt demo'ing understanding. Hospital gown donned around backside with min A while standing with rollator. Pt then handed off to PT for further activity. Pt will benefit from continued skilled OT services to address current impairments and improve IND and safety with self cares and functional transfers/mobility.  Current OT d/c recommendation [address]

## 2023-07-21 NOTE — PLAN OF CARE
Problem: Discharge Planning  Goal: Discharge to home or other facility with appropriate resources  Outcome: Progressing     Problem: Pain  Goal: Verbalizes/displays adequate comfort level or baseline comfort level  Outcome: Progressing     Problem: Safety - Adult  Goal: Free from fall injury  Outcome: Progressing     Problem: ABCDS Injury Assessment  Goal: Absence of physical injury  Outcome: Progressing     Problem: Skin/Tissue Integrity  Goal: Absence of new skin breakdown  Description: 1. Monitor for areas of redness and/or skin breakdown  2. Assess vascular access sites hourly  3. Every 4-6 hours minimum:  Change oxygen saturation probe site  4. Every 4-6 hours:  If on nasal continuous positive airway pressure, respiratory therapy assess nares and determine need for appliance change or resting period. Outcome: Progressing     Problem: Physical Therapy - Adult  Goal: By Discharge: Performs mobility at highest level of function for planned discharge setting. See evaluation for individualized goals. Description: FUNCTIONAL STATUS PRIOR TO ADMISSION: Patient was modified independent using a rollator for functional mobility. HOME SUPPORT PRIOR TO ADMISSION: Patient is from [facility] and indep with some assist at times at baseline. Uses Rolator    Physical Therapy Goals  Initiated 7/21/2023  Pt stated goal: get stronger  Pt will be I with LE HEP in 7 days. Pt will perform bed mobility with Modified [place] in 7 days. Pt will perform transfers with Modified [place] in 7 days. Pt will amb 150 feet with LRAD safely with Supervision in 7 days. Pt will verbalize and demonstrate compliance with fall precautions  in 7 days. Pt will demonstrate improvement in standing /gait balance from fair + to good in 7 days.     7/21/2023 1150 by Susanne Blount PT  Outcome: Progressing

## 2023-07-24 ENCOUNTER — FOLLOWUP TELEPHONE ENCOUNTER (OUTPATIENT)
Facility: HOSPITAL | Age: 78
End: 2023-07-24

## 2023-07-25 LAB
ACT BLD: 359 SEC (ref 74–125)
PERFORMED BY:: ABNORMAL

## 2023-07-26 NOTE — PROGRESS NOTES
Morbid obesity  -- Severe obesity  -- Other - I will add my own diagnosis  -- Disagree - Not applicable / Not valid  -- Disagree - Clinically unable to determine / Unknown  -- Refer to Clinical Documentation Reviewer    PROVIDER RESPONSE TEXT:    This patient has morbid obesity.     Query created by: Ángela Horowitz on 7/24/2023 9:13 AM      Electronically signed by:  Yenifer Childress MD 7/26/2023 9:16 AM

## 2023-09-05 NOTE — PERIOP NOTE
Patient states MD is to send Prednisone to pharmacy to take prior to planned procedure on 9/7/23, due to iodine allergy. Called Dr. Ag Lindsay office to confirm, left message for return call to PAT dept.      Spoke to Jorge with Dr. Ag Lindsay office, Elyssa to reach out to patient

## 2023-09-07 ENCOUNTER — HOSPITAL ENCOUNTER (OUTPATIENT)
Facility: HOSPITAL | Age: 78
Setting detail: OBSERVATION
Discharge: HOME | DRG: 247 | End: 2023-09-08
Attending: INTERNAL MEDICINE | Admitting: INTERNAL MEDICINE
Payer: MEDICARE

## 2023-09-07 DIAGNOSIS — I25.10 CAD (CORONARY ARTERY DISEASE): ICD-10-CM

## 2023-09-07 PROBLEM — R94.39 ABNORMAL STRESS ECG: Status: ACTIVE | Noted: 2023-09-07

## 2023-09-07 PROBLEM — Z98.890 STATUS POST CARDIAC CATHETERIZATION: Status: ACTIVE | Noted: 2023-09-07

## 2023-09-07 LAB
ACT BLD: 329 SEC (ref 74–125)
ALBUMIN SERPL-MCNC: 3.6 G/DL (ref 3.5–5)
ALBUMIN/GLOB SERPL: 0.9 (ref 1.1–2.2)
ALP SERPL-CCNC: 93 U/L (ref 45–117)
ALT SERPL-CCNC: 17 U/L (ref 12–78)
ANION GAP SERPL CALC-SCNC: 3 MMOL/L (ref 5–15)
APTT PPP: 30.2 SEC (ref 21.2–34.1)
AST SERPL W P-5'-P-CCNC: 13 U/L (ref 15–37)
BILIRUB SERPL-MCNC: 0.5 MG/DL (ref 0.2–1)
BUN SERPL-MCNC: 21 MG/DL (ref 6–20)
BUN/CREAT SERPL: 23 (ref 12–20)
CA-I BLD-MCNC: 9.1 MG/DL (ref 8.5–10.1)
CHLORIDE SERPL-SCNC: 109 MMOL/L (ref 97–108)
CO2 SERPL-SCNC: 28 MMOL/L (ref 21–32)
CREAT SERPL-MCNC: 0.92 MG/DL (ref 0.55–1.02)
ERYTHROCYTE [DISTWIDTH] IN BLOOD BY AUTOMATED COUNT: 13.6 % (ref 11.5–14.5)
GLOBULIN SER CALC-MCNC: 3.9 G/DL (ref 2–4)
GLUCOSE BLD STRIP.AUTO-MCNC: 126 MG/DL (ref 65–100)
GLUCOSE BLD STRIP.AUTO-MCNC: 296 MG/DL (ref 65–100)
GLUCOSE BLD STRIP.AUTO-MCNC: 94 MG/DL (ref 65–100)
GLUCOSE SERPL-MCNC: 135 MG/DL (ref 65–100)
HCT VFR BLD AUTO: 42.8 % (ref 35–47)
HGB BLD-MCNC: 13.6 G/DL (ref 11.5–16)
INR PPP: 0.9 (ref 0.9–1.1)
MCH RBC QN AUTO: 27.9 PG (ref 26–34)
MCHC RBC AUTO-ENTMCNC: 31.8 G/DL (ref 30–36.5)
MCV RBC AUTO: 87.7 FL (ref 80–99)
NRBC # BLD: 0 K/UL (ref 0–0.01)
NRBC BLD-RTO: 0 PER 100 WBC
PERFORMED BY:: ABNORMAL
PERFORMED BY:: NORMAL
PLATELET # BLD AUTO: 205 K/UL (ref 150–400)
PMV BLD AUTO: 10.7 FL (ref 8.9–12.9)
POTASSIUM SERPL-SCNC: 4.1 MMOL/L (ref 3.5–5.1)
PROT SERPL-MCNC: 7.5 G/DL (ref 6.4–8.2)
PROTHROMBIN TIME: 13 SEC (ref 11.9–14.6)
RBC # BLD AUTO: 4.88 M/UL (ref 3.8–5.2)
SODIUM SERPL-SCNC: 140 MMOL/L (ref 136–145)
THERAPEUTIC RANGE: NORMAL SEC (ref 82–109)
WBC # BLD AUTO: 6.8 K/UL (ref 3.6–11)

## 2023-09-07 PROCEDURE — G0378 HOSPITAL OBSERVATION PER HR: HCPCS

## 2023-09-07 PROCEDURE — 2580000003 HC RX 258: Performed by: INTERNAL MEDICINE

## 2023-09-07 PROCEDURE — 6360000002 HC RX W HCPCS: Performed by: INTERNAL MEDICINE

## 2023-09-07 PROCEDURE — 85730 THROMBOPLASTIN TIME PARTIAL: CPT

## 2023-09-07 PROCEDURE — B2111ZZ FLUOROSCOPY OF MULTIPLE CORONARY ARTERIES USING LOW OSMOLAR CONTRAST: ICD-10-PCS | Performed by: INTERNAL MEDICINE

## 2023-09-07 PROCEDURE — 2709999900 HC NON-CHARGEABLE SUPPLY: Performed by: INTERNAL MEDICINE

## 2023-09-07 PROCEDURE — B2151ZZ FLUOROSCOPY OF LEFT HEART USING LOW OSMOLAR CONTRAST: ICD-10-PCS | Performed by: INTERNAL MEDICINE

## 2023-09-07 PROCEDURE — C1874 STENT, COATED/COV W/DEL SYS: HCPCS | Performed by: INTERNAL MEDICINE

## 2023-09-07 PROCEDURE — 6360000004 HC RX CONTRAST MEDICATION: Performed by: INTERNAL MEDICINE

## 2023-09-07 PROCEDURE — 82962 GLUCOSE BLOOD TEST: CPT

## 2023-09-07 PROCEDURE — C1887 CATHETER, GUIDING: HCPCS | Performed by: INTERNAL MEDICINE

## 2023-09-07 PROCEDURE — 6370000000 HC RX 637 (ALT 250 FOR IP): Performed by: INTERNAL MEDICINE

## 2023-09-07 PROCEDURE — 93452 LEFT HRT CATH W/VENTRCLGRPHY: CPT | Performed by: INTERNAL MEDICINE

## 2023-09-07 PROCEDURE — C1769 GUIDE WIRE: HCPCS | Performed by: INTERNAL MEDICINE

## 2023-09-07 PROCEDURE — 93005 ELECTROCARDIOGRAM TRACING: CPT | Performed by: NURSE PRACTITIONER

## 2023-09-07 PROCEDURE — 4A023N7 MEASUREMENT OF CARDIAC SAMPLING AND PRESSURE, LEFT HEART, PERCUTANEOUS APPROACH: ICD-10-PCS | Performed by: INTERNAL MEDICINE

## 2023-09-07 PROCEDURE — 85347 COAGULATION TIME ACTIVATED: CPT

## 2023-09-07 PROCEDURE — 99153 MOD SED SAME PHYS/QHP EA: CPT | Performed by: INTERNAL MEDICINE

## 2023-09-07 PROCEDURE — 36415 COLL VENOUS BLD VENIPUNCTURE: CPT

## 2023-09-07 PROCEDURE — 7100000001 HC PACU RECOVERY - ADDTL 15 MIN: Performed by: INTERNAL MEDICINE

## 2023-09-07 PROCEDURE — C1725 CATH, TRANSLUMIN NON-LASER: HCPCS | Performed by: INTERNAL MEDICINE

## 2023-09-07 PROCEDURE — 027035Z DILATION OF CORONARY ARTERY, ONE ARTERY WITH TWO DRUG-ELUTING INTRALUMINAL DEVICES, PERCUTANEOUS APPROACH: ICD-10-PCS | Performed by: INTERNAL MEDICINE

## 2023-09-07 PROCEDURE — C9600 PERC DRUG-EL COR STENT SING: HCPCS | Performed by: INTERNAL MEDICINE

## 2023-09-07 PROCEDURE — C1894 INTRO/SHEATH, NON-LASER: HCPCS | Performed by: INTERNAL MEDICINE

## 2023-09-07 PROCEDURE — 85610 PROTHROMBIN TIME: CPT

## 2023-09-07 PROCEDURE — 85027 COMPLETE CBC AUTOMATED: CPT

## 2023-09-07 PROCEDURE — 80053 COMPREHEN METABOLIC PANEL: CPT

## 2023-09-07 PROCEDURE — 7100000000 HC PACU RECOVERY - FIRST 15 MIN: Performed by: INTERNAL MEDICINE

## 2023-09-07 PROCEDURE — 99152 MOD SED SAME PHYS/QHP 5/>YRS: CPT | Performed by: INTERNAL MEDICINE

## 2023-09-07 PROCEDURE — 2500000003 HC RX 250 WO HCPCS: Performed by: INTERNAL MEDICINE

## 2023-09-07 PROCEDURE — 93005 ELECTROCARDIOGRAM TRACING: CPT | Performed by: INTERNAL MEDICINE

## 2023-09-07 PROCEDURE — 6370000000 HC RX 637 (ALT 250 FOR IP): Performed by: NURSE PRACTITIONER

## 2023-09-07 DEVICE — STENT ONYXNG22526UX ONYX 2.25X26RX
Type: IMPLANTABLE DEVICE | Status: FUNCTIONAL
Brand: ONYX FRONTIER™

## 2023-09-07 DEVICE — STENT ONYXNG22512UX ONYX 2.25X12RX
Type: IMPLANTABLE DEVICE | Status: FUNCTIONAL
Brand: ONYX FRONTIER™

## 2023-09-07 RX ORDER — CLOPIDOGREL BISULFATE 75 MG/1
75 TABLET ORAL DAILY
Status: DISCONTINUED | OUTPATIENT
Start: 2023-09-07 | End: 2023-09-08 | Stop reason: HOSPADM

## 2023-09-07 RX ORDER — LIDOCAINE HYDROCHLORIDE 10 MG/ML
INJECTION, SOLUTION INFILTRATION; PERINEURAL PRN
Status: DISCONTINUED | OUTPATIENT
Start: 2023-09-07 | End: 2023-09-07 | Stop reason: HOSPADM

## 2023-09-07 RX ORDER — NITROGLYCERIN 20 MG/100ML
INJECTION INTRAVENOUS PRN
Status: DISCONTINUED | OUTPATIENT
Start: 2023-09-07 | End: 2023-09-07 | Stop reason: HOSPADM

## 2023-09-07 RX ORDER — 0.9 % SODIUM CHLORIDE 0.9 %
INTRAVENOUS SOLUTION INTRAVENOUS CONTINUOUS PRN
Status: COMPLETED | OUTPATIENT
Start: 2023-09-07 | End: 2023-09-07

## 2023-09-07 RX ORDER — SODIUM CHLORIDE 0.9 % (FLUSH) 0.9 %
5-40 SYRINGE (ML) INJECTION PRN
Status: DISCONTINUED | OUTPATIENT
Start: 2023-09-07 | End: 2023-09-08 | Stop reason: HOSPADM

## 2023-09-07 RX ORDER — INSULIN GLARGINE 100 [IU]/ML
15 INJECTION, SOLUTION SUBCUTANEOUS 2 TIMES DAILY
Status: DISCONTINUED | OUTPATIENT
Start: 2023-09-07 | End: 2023-09-08 | Stop reason: HOSPADM

## 2023-09-07 RX ORDER — ISOSORBIDE MONONITRATE 30 MG/1
30 TABLET, EXTENDED RELEASE ORAL DAILY
Status: DISCONTINUED | OUTPATIENT
Start: 2023-09-07 | End: 2023-09-08 | Stop reason: HOSPADM

## 2023-09-07 RX ORDER — HEPARIN SODIUM 1000 [USP'U]/ML
INJECTION, SOLUTION INTRAVENOUS; SUBCUTANEOUS PRN
Status: DISCONTINUED | OUTPATIENT
Start: 2023-09-07 | End: 2023-09-07 | Stop reason: HOSPADM

## 2023-09-07 RX ORDER — SODIUM CHLORIDE 9 MG/ML
INJECTION, SOLUTION INTRAVENOUS PRN
Status: DISCONTINUED | OUTPATIENT
Start: 2023-09-07 | End: 2023-09-08 | Stop reason: HOSPADM

## 2023-09-07 RX ORDER — SODIUM CHLORIDE 9 MG/ML
INJECTION, SOLUTION INTRAVENOUS CONTINUOUS
Status: DISCONTINUED | OUTPATIENT
Start: 2023-09-07 | End: 2023-09-08 | Stop reason: HOSPADM

## 2023-09-07 RX ORDER — INSULIN LISPRO 100 [IU]/ML
0-4 INJECTION, SOLUTION INTRAVENOUS; SUBCUTANEOUS NIGHTLY
Status: DISCONTINUED | OUTPATIENT
Start: 2023-09-07 | End: 2023-09-08 | Stop reason: HOSPADM

## 2023-09-07 RX ORDER — INSULIN LISPRO 100 [IU]/ML
0-4 INJECTION, SOLUTION INTRAVENOUS; SUBCUTANEOUS
Status: DISCONTINUED | OUTPATIENT
Start: 2023-09-07 | End: 2023-09-08 | Stop reason: HOSPADM

## 2023-09-07 RX ORDER — NICARDIPINE HYDROCHLORIDE 2.5 MG/ML
INJECTION INTRAVENOUS PRN
Status: DISCONTINUED | OUTPATIENT
Start: 2023-09-07 | End: 2023-09-07 | Stop reason: HOSPADM

## 2023-09-07 RX ORDER — FAMOTIDINE 10 MG/ML
INJECTION, SOLUTION INTRAVENOUS PRN
Status: DISCONTINUED | OUTPATIENT
Start: 2023-09-07 | End: 2023-09-07 | Stop reason: HOSPADM

## 2023-09-07 RX ORDER — TROSPIUM CHLORIDE 20 MG/1
20 TABLET, FILM COATED ORAL DAILY
Status: DISCONTINUED | OUTPATIENT
Start: 2023-09-07 | End: 2023-09-08 | Stop reason: HOSPADM

## 2023-09-07 RX ORDER — ATORVASTATIN CALCIUM 20 MG/1
20 TABLET, FILM COATED ORAL DAILY
Status: DISCONTINUED | OUTPATIENT
Start: 2023-09-07 | End: 2023-09-08 | Stop reason: HOSPADM

## 2023-09-07 RX ORDER — ASPIRIN 81 MG/1
81 TABLET ORAL DAILY
Status: DISCONTINUED | OUTPATIENT
Start: 2023-09-07 | End: 2023-09-08 | Stop reason: HOSPADM

## 2023-09-07 RX ORDER — PREGABALIN 150 MG/1
150 CAPSULE ORAL NIGHTLY
Status: DISCONTINUED | OUTPATIENT
Start: 2023-09-07 | End: 2023-09-08 | Stop reason: HOSPADM

## 2023-09-07 RX ORDER — FENTANYL CITRATE 50 UG/ML
INJECTION, SOLUTION INTRAMUSCULAR; INTRAVENOUS PRN
Status: DISCONTINUED | OUTPATIENT
Start: 2023-09-07 | End: 2023-09-07 | Stop reason: HOSPADM

## 2023-09-07 RX ORDER — HEPARIN SODIUM,PORCINE 10 UNIT/ML
VIAL (ML) INTRAVENOUS PRN
Status: DISCONTINUED | OUTPATIENT
Start: 2023-09-07 | End: 2023-09-07 | Stop reason: HOSPADM

## 2023-09-07 RX ORDER — SERTRALINE HYDROCHLORIDE 25 MG/1
25 TABLET, FILM COATED ORAL
Status: DISCONTINUED | OUTPATIENT
Start: 2023-09-07 | End: 2023-09-08 | Stop reason: HOSPADM

## 2023-09-07 RX ORDER — ACETAMINOPHEN 325 MG/1
650 TABLET ORAL EVERY 4 HOURS PRN
Status: DISCONTINUED | OUTPATIENT
Start: 2023-09-07 | End: 2023-09-08 | Stop reason: HOSPADM

## 2023-09-07 RX ORDER — MIDAZOLAM HYDROCHLORIDE 1 MG/ML
INJECTION INTRAMUSCULAR; INTRAVENOUS PRN
Status: DISCONTINUED | OUTPATIENT
Start: 2023-09-07 | End: 2023-09-07 | Stop reason: HOSPADM

## 2023-09-07 RX ORDER — NITROGLYCERIN 20 MG/100ML
INJECTION INTRAVENOUS CONTINUOUS PRN
Status: DISCONTINUED | OUTPATIENT
Start: 2023-09-07 | End: 2023-09-07 | Stop reason: HOSPADM

## 2023-09-07 RX ORDER — CLOPIDOGREL BISULFATE 75 MG/1
TABLET ORAL PRN
Status: DISCONTINUED | OUTPATIENT
Start: 2023-09-07 | End: 2023-09-07 | Stop reason: HOSPADM

## 2023-09-07 RX ORDER — SODIUM CHLORIDE 0.9 % (FLUSH) 0.9 %
5-40 SYRINGE (ML) INJECTION EVERY 12 HOURS SCHEDULED
Status: DISCONTINUED | OUTPATIENT
Start: 2023-09-07 | End: 2023-09-08 | Stop reason: HOSPADM

## 2023-09-07 RX ORDER — SENNA AND DOCUSATE SODIUM 50; 8.6 MG/1; MG/1
1 TABLET, FILM COATED ORAL 2 TIMES DAILY
Status: DISCONTINUED | OUTPATIENT
Start: 2023-09-07 | End: 2023-09-08 | Stop reason: HOSPADM

## 2023-09-07 RX ORDER — SUCRALFATE 1 G/1
1 TABLET ORAL 2 TIMES DAILY
Status: DISCONTINUED | OUTPATIENT
Start: 2023-09-07 | End: 2023-09-08 | Stop reason: HOSPADM

## 2023-09-07 RX ORDER — ASPIRIN 81 MG/1
TABLET, CHEWABLE ORAL PRN
Status: DISCONTINUED | OUTPATIENT
Start: 2023-09-07 | End: 2023-09-07 | Stop reason: HOSPADM

## 2023-09-07 RX ORDER — LOSARTAN POTASSIUM 25 MG/1
100 TABLET ORAL DAILY
Status: DISCONTINUED | OUTPATIENT
Start: 2023-09-07 | End: 2023-09-08 | Stop reason: HOSPADM

## 2023-09-07 RX ORDER — ARIPIPRAZOLE 5 MG/1
20 TABLET ORAL 2 TIMES DAILY
Status: DISCONTINUED | OUTPATIENT
Start: 2023-09-07 | End: 2023-09-08 | Stop reason: HOSPADM

## 2023-09-07 RX ORDER — DEXTROSE MONOHYDRATE 100 MG/ML
INJECTION, SOLUTION INTRAVENOUS CONTINUOUS PRN
Status: DISCONTINUED | OUTPATIENT
Start: 2023-09-07 | End: 2023-09-08 | Stop reason: HOSPADM

## 2023-09-07 RX ORDER — ONDANSETRON 2 MG/ML
4 INJECTION INTRAMUSCULAR; INTRAVENOUS EVERY 6 HOURS PRN
Status: DISCONTINUED | OUTPATIENT
Start: 2023-09-07 | End: 2023-09-08 | Stop reason: HOSPADM

## 2023-09-07 RX ADMIN — SODIUM CHLORIDE: 9 INJECTION, SOLUTION INTRAVENOUS at 12:27

## 2023-09-07 RX ADMIN — INSULIN GLARGINE 15 UNITS: 100 INJECTION, SOLUTION SUBCUTANEOUS at 20:39

## 2023-09-07 RX ADMIN — ISOSORBIDE MONONITRATE 30 MG: 30 TABLET, EXTENDED RELEASE ORAL at 17:22

## 2023-09-07 RX ADMIN — SERTRALINE HYDROCHLORIDE 25 MG: 25 TABLET ORAL at 20:39

## 2023-09-07 RX ADMIN — SODIUM CHLORIDE: 9 INJECTION, SOLUTION INTRAVENOUS at 19:07

## 2023-09-07 RX ADMIN — PREGABALIN 150 MG: 150 CAPSULE ORAL at 20:38

## 2023-09-07 RX ADMIN — LOSARTAN POTASSIUM 100 MG: 25 TABLET, FILM COATED ORAL at 18:25

## 2023-09-07 RX ADMIN — SUCRALFATE 1 G: 1 TABLET ORAL at 20:36

## 2023-09-07 RX ADMIN — TROSPIUM CHLORIDE 20 MG: 20 TABLET, FILM COATED ORAL at 17:22

## 2023-09-07 RX ADMIN — ARIPIPRAZOLE 20 MG: 5 TABLET ORAL at 20:38

## 2023-09-07 ASSESSMENT — PAIN SCALES - GENERAL
PAINLEVEL_OUTOF10: 0
PAINLEVEL_OUTOF10: 8
PAINLEVEL_OUTOF10: 9
PAINLEVEL_OUTOF10: 0

## 2023-09-07 ASSESSMENT — PAIN DESCRIPTION - LOCATION: LOCATION: KNEE

## 2023-09-07 ASSESSMENT — PAIN - FUNCTIONAL ASSESSMENT: PAIN_FUNCTIONAL_ASSESSMENT: 0-10

## 2023-09-07 NOTE — PROGRESS NOTES
At 1824 TR band was scheduled to be deflated, writer noticed a small splash of blood at PCI site, Dr. Huang Numbers called and has instruction to reinflate the band with 5 ml of air recheck after 30 mins and if no bleeding start deflating again and watch out for bleeding. TR band has now a total of 7ml of air 2ml of which was left from the initial deflation process. Reassessment due at 1900.

## 2023-09-07 NOTE — PROGRESS NOTES
4 Eyes Skin Assessment     NAME:  Rhonda Garduno  YOB: 1945  MEDICAL RECORD NUMBER:  [id number]    The patient is being assessed for  Admission    I agree that at least one RN has performed a thorough Head to Toe Skin Assessment on the patient. ALL assessment sites listed below have been assessed. Areas assessed by both nurses:    Head, Face, Ears, Shoulders, Back, Chest, Arms, Elbows, Hands, Sacrum. Buttock, Coccyx, Ischium, Legs. Feet and Heels, and Under Medical Devices         Does the Patient have a Wound?  No noted wound(s)       Dar Prevention initiated by RN: No  Wound Care Orders initiated by RN: No    Pressure Injury (Stage 3,4, Unstageable, DTI, NWPT, and Complex wounds) if present, place Wound referral order by RN under : No    New Ostomies, if present place, Ostomy referral order under : No     Nurse 1 eSignature: Electronically signed by Stephania Wheeler RN on 9/7/23 at 6:18 PM EDT    **SHARE this note so that the co-signing nurse can place an eSignature**    Nurse 2 eSignature: Electronically signed by Donnie Grove RN on 9/7/23 at 6:19 PM EDT

## 2023-09-07 NOTE — PROGRESS NOTES
4 Eyes Skin Assessment     NAME:  Rhonda Garduno  YOB: 1945  MEDICAL RECORD NUMBER:  [id number]    The patient is being assessed for  Admission    I agree that at least one RN has performed a thorough Head to Toe Skin Assessment on the patient. ALL assessment sites listed below have been assessed. Areas assessed by both nurses:    Head, Face, Ears, Shoulders, Back, Chest, Arms, Elbows, Hands, Sacrum. Buttock, Coccyx, Ischium, Legs. Feet and Heels, and Under Medical Devices         Does the Patient have a Wound?  No noted wound(s)       Dar Prevention initiated by RN: Yes  Wound Care Orders initiated by RN: No    Pressure Injury (Stage 3,4, Unstageable, DTI, NWPT, and Complex wounds) if present, place Wound referral order by RN under : No    New Ostomies, if present place, Ostomy referral order under : No     Nurse 1 eSignature: Electronically signed by Jayleen Little RN on 9/7/23 at 6:05 PM EDT    **SHARE this note so that the co-signing nurse can place an eSignature**    Nurse 2 eSignature: Electronically signed by Izabella Beasley RN on 9/7/23 at 6:06 PM EDT

## 2023-09-07 NOTE — PROGRESS NOTES
4 Eyes Skin Assessment     NAME:  Rhonda Garduno  YOB: 1945  MEDICAL RECORD NUMBER:  [id number]    The patient is being assessed for  Admission    I agree that at least one RN has performed a thorough Head to Toe Skin Assessment on the patient. ALL assessment sites listed below have been assessed. Areas assessed by both nurses:  Sacrum, elbows, heels, back            Does the Patient have a Wound?  No noted wound(s)       Dar Prevention initiated by RN: Yes  Wound Care Orders initiated by RN: No    Pressure Injury (Stage 3,4, Unstageable, DTI, NWPT, and Complex wounds) if present, place Wound referral order by RN under : No    New Ostomies, if present place, Ostomy referral order under : No     Nurse 1 eSignature: Electronically signed by Tara Casey RN on 9/7/23 at 5:48 PM EDT    **SHARE this note so that the co-signing nurse can place an eSignature**    Nurse 2 eSignature: {Esignature:[id number]}

## 2023-09-07 NOTE — PROGRESS NOTES
Received patient from PACU status post PCI with a Right TR band. Accordingto PACU nurse  TR band was inflated at 1539 with 11ml of air and deflation is due 2 hours after inflation which is going to be at 1739 as per protocol. Site was assessed, no bleeding or hematoma noted. Patient is seen lying comfortably on room air with no complaints of chest pain or shortness of breath.     Deflation started at 1739 and charted at the flowsheet accordingly

## 2023-09-07 NOTE — PROGRESS NOTES
Pt. Transferred to room 479 via bed in stable condition. Bedside report given, SBAR reviewed, sites viewed. Pt.'s daughter was notified of the room number and the belongings were brought up to her room.

## 2023-09-08 ENCOUNTER — HOSPITAL ENCOUNTER (INPATIENT)
Facility: HOSPITAL | Age: 78
LOS: 3 days | Discharge: NURSING, REHAB OR LONG-TERM CARE | DRG: 247 | End: 2023-09-11
Attending: EMERGENCY MEDICINE | Admitting: FAMILY MEDICINE
Payer: MEDICARE

## 2023-09-08 ENCOUNTER — APPOINTMENT (OUTPATIENT)
Facility: HOSPITAL | Age: 78
DRG: 247 | End: 2023-09-08
Payer: MEDICARE

## 2023-09-08 VITALS
RESPIRATION RATE: 19 BRPM | HEART RATE: 101 BPM | HEIGHT: 58 IN | BODY MASS INDEX: 50.72 KG/M2 | TEMPERATURE: 98.1 F | SYSTOLIC BLOOD PRESSURE: 124 MMHG | OXYGEN SATURATION: 94 % | WEIGHT: 241.62 LBS | DIASTOLIC BLOOD PRESSURE: 72 MMHG

## 2023-09-08 DIAGNOSIS — R07.9 CHEST PAIN, UNSPECIFIED TYPE: Primary | ICD-10-CM

## 2023-09-08 PROBLEM — R94.39 ABNORMAL STRESS ECG: Status: RESOLVED | Noted: 2023-09-07 | Resolved: 2023-09-08

## 2023-09-08 LAB
ALBUMIN SERPL-MCNC: 3.4 G/DL (ref 3.5–5)
ALBUMIN/GLOB SERPL: 1 (ref 1.1–2.2)
ALP SERPL-CCNC: 77 U/L (ref 45–117)
ALT SERPL-CCNC: 16 U/L (ref 12–78)
ANION GAP SERPL CALC-SCNC: 6 MMOL/L (ref 5–15)
ANION GAP SERPL CALC-SCNC: 8 MMOL/L (ref 5–15)
AST SERPL W P-5'-P-CCNC: 10 U/L (ref 15–37)
BASOPHILS # BLD: 0 K/UL (ref 0–0.1)
BASOPHILS NFR BLD: 0 % (ref 0–1)
BILIRUB SERPL-MCNC: 0.4 MG/DL (ref 0.2–1)
BNP SERPL-MCNC: 177 PG/ML
BUN SERPL-MCNC: 21 MG/DL (ref 6–20)
BUN SERPL-MCNC: 26 MG/DL (ref 6–20)
BUN/CREAT SERPL: 23 (ref 12–20)
BUN/CREAT SERPL: 23 (ref 12–20)
CA-I BLD-MCNC: 8.9 MG/DL (ref 8.5–10.1)
CA-I BLD-MCNC: 9.1 MG/DL (ref 8.5–10.1)
CHLORIDE SERPL-SCNC: 107 MMOL/L (ref 97–108)
CHLORIDE SERPL-SCNC: 108 MMOL/L (ref 97–108)
CO2 SERPL-SCNC: 21 MMOL/L (ref 21–32)
CO2 SERPL-SCNC: 26 MMOL/L (ref 21–32)
CREAT SERPL-MCNC: 0.92 MG/DL (ref 0.55–1.02)
CREAT SERPL-MCNC: 1.13 MG/DL (ref 0.55–1.02)
DIFFERENTIAL METHOD BLD: ABNORMAL
EKG ATRIAL RATE: 76 BPM
EKG ATRIAL RATE: 82 BPM
EKG DIAGNOSIS: NORMAL
EKG DIAGNOSIS: NORMAL
EKG P AXIS: 41 DEGREES
EKG P AXIS: 42 DEGREES
EKG P-R INTERVAL: 188 MS
EKG P-R INTERVAL: 188 MS
EKG Q-T INTERVAL: 362 MS
EKG Q-T INTERVAL: 384 MS
EKG QRS DURATION: 94 MS
EKG QRS DURATION: 98 MS
EKG QTC CALCULATION (BAZETT): 422 MS
EKG QTC CALCULATION (BAZETT): 432 MS
EKG R AXIS: 6 DEGREES
EKG R AXIS: 9 DEGREES
EKG T AXIS: 44 DEGREES
EKG T AXIS: 8 DEGREES
EKG VENTRICULAR RATE: 76 BPM
EKG VENTRICULAR RATE: 82 BPM
EOSINOPHIL # BLD: 0.1 K/UL (ref 0–0.4)
EOSINOPHIL NFR BLD: 0 % (ref 0–7)
ERYTHROCYTE [DISTWIDTH] IN BLOOD BY AUTOMATED COUNT: 13.6 % (ref 11.5–14.5)
ERYTHROCYTE [DISTWIDTH] IN BLOOD BY AUTOMATED COUNT: 13.9 % (ref 11.5–14.5)
GLOBULIN SER CALC-MCNC: 3.4 G/DL (ref 2–4)
GLUCOSE BLD STRIP.AUTO-MCNC: 229 MG/DL (ref 65–100)
GLUCOSE BLD STRIP.AUTO-MCNC: 257 MG/DL (ref 65–100)
GLUCOSE SERPL-MCNC: 249 MG/DL (ref 65–100)
GLUCOSE SERPL-MCNC: 322 MG/DL (ref 65–100)
HCT VFR BLD AUTO: 40.1 % (ref 35–47)
HCT VFR BLD AUTO: 42 % (ref 35–47)
HGB BLD-MCNC: 12.8 G/DL (ref 11.5–16)
HGB BLD-MCNC: 13.3 G/DL (ref 11.5–16)
IMM GRANULOCYTES # BLD AUTO: 0.1 K/UL (ref 0–0.04)
IMM GRANULOCYTES NFR BLD AUTO: 1 % (ref 0–0.5)
LYMPHOCYTES # BLD: 2.7 K/UL (ref 0.8–3.5)
LYMPHOCYTES NFR BLD: 23 % (ref 12–49)
MCH RBC QN AUTO: 27.8 PG (ref 26–34)
MCH RBC QN AUTO: 27.9 PG (ref 26–34)
MCHC RBC AUTO-ENTMCNC: 31.7 G/DL (ref 30–36.5)
MCHC RBC AUTO-ENTMCNC: 31.9 G/DL (ref 30–36.5)
MCV RBC AUTO: 87.2 FL (ref 80–99)
MCV RBC AUTO: 88.2 FL (ref 80–99)
MONOCYTES # BLD: 0.8 K/UL (ref 0–1)
MONOCYTES NFR BLD: 7 % (ref 5–13)
NEUTS SEG # BLD: 8.2 K/UL (ref 1.8–8)
NEUTS SEG NFR BLD: 69 % (ref 32–75)
NRBC # BLD: 0 K/UL (ref 0–0.01)
NRBC # BLD: 0 K/UL (ref 0–0.01)
NRBC BLD-RTO: 0 PER 100 WBC
NRBC BLD-RTO: 0 PER 100 WBC
PERFORMED BY:: ABNORMAL
PERFORMED BY:: ABNORMAL
PLATELET # BLD AUTO: 202 K/UL (ref 150–400)
PLATELET # BLD AUTO: 204 K/UL (ref 150–400)
PMV BLD AUTO: 10.7 FL (ref 8.9–12.9)
PMV BLD AUTO: 10.9 FL (ref 8.9–12.9)
POTASSIUM SERPL-SCNC: 3.8 MMOL/L (ref 3.5–5.1)
POTASSIUM SERPL-SCNC: 3.8 MMOL/L (ref 3.5–5.1)
PROT SERPL-MCNC: 6.8 G/DL (ref 6.4–8.2)
RBC # BLD AUTO: 4.6 M/UL (ref 3.8–5.2)
RBC # BLD AUTO: 4.76 M/UL (ref 3.8–5.2)
SODIUM SERPL-SCNC: 135 MMOL/L (ref 136–145)
SODIUM SERPL-SCNC: 141 MMOL/L (ref 136–145)
TROPONIN I SERPL HS-MCNC: 354 NG/L (ref 0–51)
TROPONIN I SERPL HS-MCNC: 362 NG/L (ref 0–51)
WBC # BLD AUTO: 11.9 K/UL (ref 3.6–11)
WBC # BLD AUTO: 7.8 K/UL (ref 3.6–11)

## 2023-09-08 PROCEDURE — 93005 ELECTROCARDIOGRAM TRACING: CPT | Performed by: EMERGENCY MEDICINE

## 2023-09-08 PROCEDURE — 94761 N-INVAS EAR/PLS OXIMETRY MLT: CPT

## 2023-09-08 PROCEDURE — 85027 COMPLETE CBC AUTOMATED: CPT

## 2023-09-08 PROCEDURE — 2580000003 HC RX 258: Performed by: INTERNAL MEDICINE

## 2023-09-08 PROCEDURE — 80048 BASIC METABOLIC PNL TOTAL CA: CPT

## 2023-09-08 PROCEDURE — G0378 HOSPITAL OBSERVATION PER HR: HCPCS

## 2023-09-08 PROCEDURE — 82962 GLUCOSE BLOOD TEST: CPT

## 2023-09-08 PROCEDURE — 94640 AIRWAY INHALATION TREATMENT: CPT

## 2023-09-08 PROCEDURE — 1100000000 HC RM PRIVATE

## 2023-09-08 PROCEDURE — 99285 EMERGENCY DEPT VISIT HI MDM: CPT

## 2023-09-08 PROCEDURE — 80053 COMPREHEN METABOLIC PANEL: CPT

## 2023-09-08 PROCEDURE — 2580000003 HC RX 258: Performed by: NURSE PRACTITIONER

## 2023-09-08 PROCEDURE — 83880 ASSAY OF NATRIURETIC PEPTIDE: CPT

## 2023-09-08 PROCEDURE — 6370000000 HC RX 637 (ALT 250 FOR IP): Performed by: NURSE PRACTITIONER

## 2023-09-08 PROCEDURE — 85025 COMPLETE CBC W/AUTO DIFF WBC: CPT

## 2023-09-08 PROCEDURE — 36415 COLL VENOUS BLD VENIPUNCTURE: CPT

## 2023-09-08 PROCEDURE — 71045 X-RAY EXAM CHEST 1 VIEW: CPT

## 2023-09-08 PROCEDURE — 84484 ASSAY OF TROPONIN QUANT: CPT

## 2023-09-08 RX ORDER — SODIUM CHLORIDE 0.9 % (FLUSH) 0.9 %
5-40 SYRINGE (ML) INJECTION PRN
Status: DISCONTINUED | OUTPATIENT
Start: 2023-09-08 | End: 2023-09-11 | Stop reason: HOSPADM

## 2023-09-08 RX ORDER — ACETAMINOPHEN 650 MG/1
650 SUPPOSITORY RECTAL EVERY 6 HOURS PRN
Status: DISCONTINUED | OUTPATIENT
Start: 2023-09-08 | End: 2023-09-11 | Stop reason: HOSPADM

## 2023-09-08 RX ORDER — SODIUM CHLORIDE 0.9 % (FLUSH) 0.9 %
5-40 SYRINGE (ML) INJECTION EVERY 12 HOURS SCHEDULED
Status: DISCONTINUED | OUTPATIENT
Start: 2023-09-08 | End: 2023-09-11 | Stop reason: HOSPADM

## 2023-09-08 RX ORDER — INSULIN GLARGINE 100 [IU]/ML
30 INJECTION, SOLUTION SUBCUTANEOUS 2 TIMES DAILY
Status: DISCONTINUED | OUTPATIENT
Start: 2023-09-08 | End: 2023-09-11 | Stop reason: HOSPADM

## 2023-09-08 RX ORDER — VITAMIN B COMPLEX
1000 TABLET ORAL DAILY
Status: DISCONTINUED | OUTPATIENT
Start: 2023-09-09 | End: 2023-09-11 | Stop reason: HOSPADM

## 2023-09-08 RX ORDER — ENOXAPARIN SODIUM 100 MG/ML
30 INJECTION SUBCUTANEOUS 2 TIMES DAILY
Status: DISCONTINUED | OUTPATIENT
Start: 2023-09-08 | End: 2023-09-11 | Stop reason: HOSPADM

## 2023-09-08 RX ORDER — CLOPIDOGREL BISULFATE 75 MG/1
75 TABLET ORAL DAILY
Status: DISCONTINUED | OUTPATIENT
Start: 2023-09-09 | End: 2023-09-11 | Stop reason: HOSPADM

## 2023-09-08 RX ORDER — ASPIRIN 81 MG/1
81 TABLET ORAL DAILY
Status: DISCONTINUED | OUTPATIENT
Start: 2023-09-09 | End: 2023-09-11 | Stop reason: HOSPADM

## 2023-09-08 RX ORDER — PREGABALIN 75 MG/1
150 CAPSULE ORAL NIGHTLY
Status: DISCONTINUED | OUTPATIENT
Start: 2023-09-08 | End: 2023-09-11 | Stop reason: HOSPADM

## 2023-09-08 RX ORDER — TOLTERODINE 4 MG/1
4 CAPSULE, EXTENDED RELEASE ORAL DAILY
Status: DISCONTINUED | OUTPATIENT
Start: 2023-09-09 | End: 2023-09-09

## 2023-09-08 RX ORDER — INSULIN LISPRO 100 [IU]/ML
0-16 INJECTION, SOLUTION INTRAVENOUS; SUBCUTANEOUS
Status: DISCONTINUED | OUTPATIENT
Start: 2023-09-09 | End: 2023-09-09

## 2023-09-08 RX ORDER — SODIUM CHLORIDE 9 MG/ML
INJECTION, SOLUTION INTRAVENOUS PRN
Status: DISCONTINUED | OUTPATIENT
Start: 2023-09-08 | End: 2023-09-11 | Stop reason: HOSPADM

## 2023-09-08 RX ORDER — DEXTROSE MONOHYDRATE 100 MG/ML
INJECTION, SOLUTION INTRAVENOUS CONTINUOUS PRN
Status: DISCONTINUED | OUTPATIENT
Start: 2023-09-08 | End: 2023-09-11 | Stop reason: HOSPADM

## 2023-09-08 RX ORDER — ACETAMINOPHEN 325 MG/1
650 TABLET ORAL EVERY 6 HOURS PRN
Status: DISCONTINUED | OUTPATIENT
Start: 2023-09-08 | End: 2023-09-11 | Stop reason: HOSPADM

## 2023-09-08 RX ORDER — LOSARTAN POTASSIUM 50 MG/1
100 TABLET ORAL DAILY
Status: DISCONTINUED | OUTPATIENT
Start: 2023-09-09 | End: 2023-09-11 | Stop reason: HOSPADM

## 2023-09-08 RX ORDER — POLYETHYLENE GLYCOL 3350 17 G/17G
17 POWDER, FOR SOLUTION ORAL DAILY PRN
Status: DISCONTINUED | OUTPATIENT
Start: 2023-09-08 | End: 2023-09-11 | Stop reason: HOSPADM

## 2023-09-08 RX ORDER — ONDANSETRON 4 MG/1
4 TABLET, ORALLY DISINTEGRATING ORAL EVERY 8 HOURS PRN
Status: DISCONTINUED | OUTPATIENT
Start: 2023-09-08 | End: 2023-09-11 | Stop reason: HOSPADM

## 2023-09-08 RX ORDER — NITROGLYCERIN 0.4 MG/1
0.4 TABLET SUBLINGUAL EVERY 5 MIN PRN
Status: DISCONTINUED | OUTPATIENT
Start: 2023-09-08 | End: 2023-09-11 | Stop reason: HOSPADM

## 2023-09-08 RX ORDER — ISOSORBIDE MONONITRATE 30 MG/1
30 TABLET, EXTENDED RELEASE ORAL DAILY
Status: DISCONTINUED | OUTPATIENT
Start: 2023-09-09 | End: 2023-09-11 | Stop reason: HOSPADM

## 2023-09-08 RX ORDER — ATORVASTATIN CALCIUM 20 MG/1
20 TABLET, FILM COATED ORAL DAILY
Status: DISCONTINUED | OUTPATIENT
Start: 2023-09-09 | End: 2023-09-11 | Stop reason: HOSPADM

## 2023-09-08 RX ORDER — HYDROXYZINE HYDROCHLORIDE 25 MG/1
50 TABLET, FILM COATED ORAL NIGHTLY PRN
Status: DISCONTINUED | OUTPATIENT
Start: 2023-09-08 | End: 2023-09-11 | Stop reason: HOSPADM

## 2023-09-08 RX ORDER — ARIPIPRAZOLE 5 MG/1
20 TABLET ORAL 2 TIMES DAILY
Status: DISCONTINUED | OUTPATIENT
Start: 2023-09-08 | End: 2023-09-11 | Stop reason: HOSPADM

## 2023-09-08 RX ORDER — ONDANSETRON 2 MG/ML
4 INJECTION INTRAMUSCULAR; INTRAVENOUS EVERY 6 HOURS PRN
Status: DISCONTINUED | OUTPATIENT
Start: 2023-09-08 | End: 2023-09-11 | Stop reason: HOSPADM

## 2023-09-08 RX ORDER — SUCRALFATE 1 G/1
1 TABLET ORAL 2 TIMES DAILY
Status: DISCONTINUED | OUTPATIENT
Start: 2023-09-08 | End: 2023-09-11 | Stop reason: HOSPADM

## 2023-09-08 RX ORDER — INSULIN LISPRO 100 [IU]/ML
0-4 INJECTION, SOLUTION INTRAVENOUS; SUBCUTANEOUS NIGHTLY
Status: DISCONTINUED | OUTPATIENT
Start: 2023-09-08 | End: 2023-09-11 | Stop reason: HOSPADM

## 2023-09-08 RX ORDER — FAMOTIDINE 20 MG/1
20 TABLET, FILM COATED ORAL 2 TIMES DAILY
Status: DISCONTINUED | OUTPATIENT
Start: 2023-09-08 | End: 2023-09-11 | Stop reason: HOSPADM

## 2023-09-08 RX ADMIN — ASPIRIN 81 MG: 81 TABLET, COATED ORAL at 09:13

## 2023-09-08 RX ADMIN — ISOSORBIDE MONONITRATE 30 MG: 30 TABLET, EXTENDED RELEASE ORAL at 09:13

## 2023-09-08 RX ADMIN — INSULIN LISPRO 1 UNITS: 100 INJECTION, SOLUTION INTRAVENOUS; SUBCUTANEOUS at 09:11

## 2023-09-08 RX ADMIN — ATORVASTATIN CALCIUM 20 MG: 20 TABLET, FILM COATED ORAL at 09:13

## 2023-09-08 RX ADMIN — SENNOSIDES AND DOCUSATE SODIUM 1 TABLET: 50; 8.6 TABLET ORAL at 09:13

## 2023-09-08 RX ADMIN — LOSARTAN POTASSIUM 100 MG: 25 TABLET, FILM COATED ORAL at 09:13

## 2023-09-08 RX ADMIN — ARIPIPRAZOLE 20 MG: 5 TABLET ORAL at 09:12

## 2023-09-08 RX ADMIN — SUCRALFATE 1 G: 1 TABLET ORAL at 09:13

## 2023-09-08 RX ADMIN — INSULIN GLARGINE 15 UNITS: 100 INJECTION, SOLUTION SUBCUTANEOUS at 09:12

## 2023-09-08 RX ADMIN — INSULIN LISPRO 2 UNITS: 100 INJECTION, SOLUTION INTRAVENOUS; SUBCUTANEOUS at 12:25

## 2023-09-08 RX ADMIN — SODIUM CHLORIDE: 9 INJECTION, SOLUTION INTRAVENOUS at 11:29

## 2023-09-08 RX ADMIN — CLOPIDOGREL BISULFATE 75 MG: 75 TABLET ORAL at 09:13

## 2023-09-08 RX ADMIN — Medication 2 PUFF: at 08:03

## 2023-09-08 RX ADMIN — SODIUM CHLORIDE, PRESERVATIVE FREE 10 ML: 5 INJECTION INTRAVENOUS at 09:14

## 2023-09-08 RX ADMIN — TROSPIUM CHLORIDE 20 MG: 20 TABLET, FILM COATED ORAL at 09:16

## 2023-09-08 ASSESSMENT — LIFESTYLE VARIABLES
HOW OFTEN DO YOU HAVE A DRINK CONTAINING ALCOHOL: NEVER
HOW MANY STANDARD DRINKS CONTAINING ALCOHOL DO YOU HAVE ON A TYPICAL DAY: PATIENT DOES NOT DRINK

## 2023-09-08 ASSESSMENT — PAIN SCALES - GENERAL: PAINLEVEL_OUTOF10: 0

## 2023-09-08 ASSESSMENT — PAIN - FUNCTIONAL ASSESSMENT: PAIN_FUNCTIONAL_ASSESSMENT: 0-10

## 2023-09-08 ASSESSMENT — PAIN DESCRIPTION - LOCATION: LOCATION: CHEST

## 2023-09-08 ASSESSMENT — HEART SCORE: ECG: 0

## 2023-09-08 NOTE — PROGRESS NOTES
Discharge paperwork complete. Just print when patient ready to leave. Waiting for discharge summary. Primary nurse aware.

## 2023-09-08 NOTE — PROGRESS NOTES
Information about the importance of antiplatelet medication compliance will be included in the patient's discharge paperwork. Patient was scheduled for an initial evaluation appointment for October 10 at 1:30. This appointment, along with the address and contact information for the cardiac rehab facility to which she was referred will be included in the patient's discharge paperwork. Patient's information was forwarded to the cardiac rehab facility. Cardiac rehab staff will follow up to remind patient of this appointment. Provided patient education about and explained the importance of medication compliance. Patient stated that she can afford the antiplatelet medication prescribed. Patient was advised that if the antiplatelet medication becomes unaffordable, she must notify the cardiologist immediately so that an alternate plan for antiplatelet therapy can be made. Patient stated that she will fill this prescription before or upon discharge so that it will be available for the next scheduled dose after discharge. Patient asked appropriate questions and verbalized understanding during this consultation and was given printed teaching materials and my contact information in case I can provide further assistance. We also discussed outpatient cardiac rehab and her upcoming scheduled appointment.

## 2023-09-08 NOTE — ED TRIAGE NOTES
To ED via EMS c/o generalized CP described as \"sharp\" and radiating to left jaw s/p radial catheterization with stent(s) placement today. Has not had NTG, given ASA 325mg PTA.

## 2023-09-08 NOTE — DISCHARGE SUMMARY
Cardiology Discharge Summary     Patient ID:    Muna Mercado  [id number]  68 y.o.  1945    Admit date: 9/7/2023    Discharge date : 9/8/2023    Final Diagnoses: s/p PCI    Reason for Hospitalization:  CAD    Hospital Course: The patient presented for OP cath, s/p PCI LCX x 2. Feeling fine today, denies chest pain or sob    Plan: Continue dapt, cardiac rehab, aggressive risk factor modification.     Discharge Medications:      Medication List        CONTINUE taking these medications      ARIPiprazole 20 MG tablet  Commonly known as: ABILIFY     aspirin 81 MG EC tablet     atorvastatin 20 MG tablet  Commonly known as: LIPITOR     clopidogrel 75 MG tablet  Commonly known as: PLAVIX  Take 1 tablet by mouth daily     empagliflozin 10 MG tablet  Commonly known as: JARDIANCE     famotidine 20 MG tablet  Commonly known as: PEPCID     hydrOXYzine HCl 50 MG tablet  Commonly known as: ATARAX     Incruse Ellipta 62.5 MCG/ACT inhaler  Generic drug: umeclidinium bromide     insulin glargine 100 UNIT/ML injection vial  Commonly known as: LANTUS     isosorbide mononitrate 30 MG extended release tablet  Commonly known as: IMDUR     Januvia 100 MG tablet  Generic drug: SITagliptin     losartan 100 MG tablet  Commonly known as: COZAAR     nitroGLYCERIN 0.4 MG SL tablet  Commonly known as: NITROSTAT     pregabalin 150 MG capsule  Commonly known as: LYRICA     sertraline 25 MG tablet  Commonly known as: ZOLOFT     sucralfate 1 GM tablet  Commonly known as: CARAFATE     tolterodine 4 MG extended release capsule  Commonly known as: DETROL LA     vitamin D 25 MCG (1000 UT) Caps              Follow up Care:    1. 2 weeks     Follow-up Information       Follow up With Specialties Details Why Contact Info    Henrietta Gerber MD Cardiothoracic Surgery, Cardiac Electrophysiology, Cardiology, Interventional Cardiology Follow up in 2 week(s)  [address]  [phone] Patient Follow Up Instructions: Activity: No heavy lifting  Diet:  heart healthy  Wound care: keep clean and dry    Condition at Discharge:  Stable  __________________________________________________________________    Disposition  home  ____________________________________________________________________    Discharge Exam:  Patient seen and examined by me on discharge day. General:  Alert, in no acute distress  Cardiovascular:  RRR,   Respiratory:  Lungs are clear  Extremities:  no lower extremity edema  Cardiac catheterization access site is CDI    Significant Diagnostic Studies:   9/7/2023: BUN 21 mg/dL (H; Ref range: 6 - 20 mg/dL); CO2 28 mmol/L (Ref range: 21 - 32 mmol/L); Hematocrit 42.8 % (Ref range: 35.0 - 47.0 %); Hemoglobin 13.6 g/dL (Ref range: 11.5 - 16.0 g/dL); Potassium 4.1 mmol/L (Ref range: 3.5 - 5.1 mmol/L); Sodium 140 mmol/L (Ref range: 136 - 145 mmol/L)  9/8/2023: BUN 21 mg/dL (H; Ref range: 6 - 20 mg/dL); CO2 21 mmol/L (Ref range: 21 - 32 mmol/L); Hematocrit 42.0 % (Ref range: 35.0 - 47.0 %); Hemoglobin 13.3 g/dL (Ref range: 11.5 - 16.0 g/dL); Potassium 3.8 mmol/L (Ref range: 3.5 - 5.1 mmol/L); Sodium 135 mmol/L (L; Ref range: 136 - 145 mmol/L)  Recent Labs     09/07/23  1200 09/08/23  0335   WBC 6.8 7.8   HGB 13.6 13.3   HCT 42.8 42.0    204     Recent Labs     09/07/23  1200 09/08/23  0335    135*   K 4.1 3.8   * 108   CO2 28 21   BUN 21* 21*     Recent Labs     09/07/23  1200   ALT 17   GLOB 3.9     Recent Labs     09/07/23  1200   INR 0.9   APTT 30.2      No results for input(s): TIBC, FERR in the last 72 hours. Invalid input(s): FE, PSAT   No results for input(s): PH, PCO2, PO2 in the last 72 hours. No results for input(s): CPK, CKMB, TROPONINI in the last 72 hours.   No results found for: GLUCPOC    Signed:  ROBLES Thorpe - LACEY  9/8/2023  2:30 PM

## 2023-09-08 NOTE — PROGRESS NOTES
1900  TR band has no new blood at 1900 upon assessment. Cap refill under 3 seconds in right hand. Initial deflation started, 2ml removed. Total is now 5ml. 1930  No new blood assessed under TR band window. Cap refill under 3 seconds in right hand. 2 mL removed, total is now 3 ml.    2000  No new blood assessed under TR band window. Cap refill remains under 3 seconds on right hand. 3 mL removed, and TR band is fully deflated. 2100  No new blood assessed under TR band window.

## 2023-09-08 NOTE — DISCHARGE INSTRUCTIONS
IMPORTANT    People who undergo angioplasty and/or stent placement procedures are prescribed aspirin along with certain medications called anticlotting or antiplatelet medications. These medications include: Plavix (clopidogrel), Effient (prasugrel), and Brilinta (ticagrelor). It is important to take the aspirin and the anticlotting medication that you were prescribed every day in order to reduce the probability that the stent will become filled with blood clot. Angioplasty and/or stent placement procedures are done so that a blocked artery can be opened. If the stent becomes filled with blood clot, the artery becomes blocked off again. This can result in a heart attack, or even death. It is extremely important to take all the medicines prescribed by your cardiologist exactly as they were prescribed. Failure to take certain medications - particularly aspirin along with Plavix (clopidogrel),  Effient (prasugrel), or Brilinta(ticagrelor) - can result in a heart attack, or even death. Do not miss any doses. It is vital that aspirin along with Plavix, Effient, or Brilinta be taken every single day. If you have any questions or concerns regarding your medications, please consult your cardiologist. He/she is the only person who can adjust or stop these medications. You must fill the prescription for these medications immediately upon discharge so that you do not miss any doses. If you cannot afford the medication prescribed to you, please let your cardiologist know immediately. _____________________________________________________________________________________________________    Your cardiologist has ordered cardiac rehabilitation for you as part of your recovery process. Cardiac rehab is a very important way to help you to feel better and stronger more quickly as well as reduce the risks of heart problems in the future.     Cardiac rehabilitation services are provided at:  [facility]  [address]

## 2023-09-08 NOTE — PROGRESS NOTES
S/P PCI distal circumflex 90% lesion with 2 ANA ROSA, right radial approach. Full cath report to follow.

## 2023-09-08 NOTE — PROGRESS NOTES
With order for discharge. Discharge instructions given. Removed tele box and IV site. All belongings returned. Wheeled to lobby safely. Left facility at 1540h.

## 2023-09-09 LAB
ALBUMIN SERPL-MCNC: 3 G/DL (ref 3.5–5)
ALBUMIN/GLOB SERPL: 0.8 (ref 1.1–2.2)
ALP SERPL-CCNC: 72 U/L (ref 45–117)
ALT SERPL-CCNC: 14 U/L (ref 12–78)
ANION GAP SERPL CALC-SCNC: 4 MMOL/L (ref 5–15)
AST SERPL W P-5'-P-CCNC: 14 U/L (ref 15–37)
BASOPHILS # BLD: 0 K/UL (ref 0–0.1)
BASOPHILS NFR BLD: 0 % (ref 0–1)
BILIRUB SERPL-MCNC: 0.4 MG/DL (ref 0.2–1)
BUN SERPL-MCNC: 24 MG/DL (ref 6–20)
BUN/CREAT SERPL: 28 (ref 12–20)
CA-I BLD-MCNC: 8.8 MG/DL (ref 8.5–10.1)
CHLORIDE SERPL-SCNC: 110 MMOL/L (ref 97–108)
CO2 SERPL-SCNC: 25 MMOL/L (ref 21–32)
CREAT SERPL-MCNC: 0.85 MG/DL (ref 0.55–1.02)
DIFFERENTIAL METHOD BLD: ABNORMAL
EOSINOPHIL # BLD: 0.1 K/UL (ref 0–0.4)
EOSINOPHIL NFR BLD: 1 % (ref 0–7)
ERYTHROCYTE [DISTWIDTH] IN BLOOD BY AUTOMATED COUNT: 14 % (ref 11.5–14.5)
EST. AVERAGE GLUCOSE BLD GHB EST-MCNC: 197 MG/DL
GLOBULIN SER CALC-MCNC: 3.7 G/DL (ref 2–4)
GLUCOSE BLD STRIP.AUTO-MCNC: 129 MG/DL (ref 65–100)
GLUCOSE BLD STRIP.AUTO-MCNC: 175 MG/DL (ref 65–100)
GLUCOSE BLD STRIP.AUTO-MCNC: 196 MG/DL (ref 65–100)
GLUCOSE BLD STRIP.AUTO-MCNC: 210 MG/DL (ref 65–100)
GLUCOSE BLD STRIP.AUTO-MCNC: 218 MG/DL (ref 65–100)
GLUCOSE SERPL-MCNC: 228 MG/DL (ref 65–100)
HBA1C MFR BLD: 8.5 % (ref 4–5.6)
HCT VFR BLD AUTO: 40.4 % (ref 35–47)
HGB BLD-MCNC: 12.6 G/DL (ref 11.5–16)
IMM GRANULOCYTES # BLD AUTO: 0.1 K/UL (ref 0–0.04)
IMM GRANULOCYTES NFR BLD AUTO: 1 % (ref 0–0.5)
LYMPHOCYTES # BLD: 2.5 K/UL (ref 0.8–3.5)
LYMPHOCYTES NFR BLD: 37 % (ref 12–49)
MAGNESIUM SERPL-MCNC: 2.2 MG/DL (ref 1.6–2.4)
MCH RBC QN AUTO: 27.9 PG (ref 26–34)
MCHC RBC AUTO-ENTMCNC: 31.2 G/DL (ref 30–36.5)
MCV RBC AUTO: 89.4 FL (ref 80–99)
MONOCYTES # BLD: 0.4 K/UL (ref 0–1)
MONOCYTES NFR BLD: 6 % (ref 5–13)
NEUTS SEG # BLD: 3.7 K/UL (ref 1.8–8)
NEUTS SEG NFR BLD: 55 % (ref 32–75)
NRBC # BLD: 0 K/UL (ref 0–0.01)
NRBC BLD-RTO: 0 PER 100 WBC
PERFORMED BY:: ABNORMAL
PLATELET # BLD AUTO: 186 K/UL (ref 150–400)
PMV BLD AUTO: 11.2 FL (ref 8.9–12.9)
POTASSIUM SERPL-SCNC: 3.7 MMOL/L (ref 3.5–5.1)
PROT SERPL-MCNC: 6.7 G/DL (ref 6.4–8.2)
RBC # BLD AUTO: 4.52 M/UL (ref 3.8–5.2)
SODIUM SERPL-SCNC: 139 MMOL/L (ref 136–145)
TROPONIN I SERPL HS-MCNC: 397 NG/L (ref 0–51)
WBC # BLD AUTO: 6.7 K/UL (ref 3.6–11)

## 2023-09-09 PROCEDURE — 6370000000 HC RX 637 (ALT 250 FOR IP): Performed by: FAMILY MEDICINE

## 2023-09-09 PROCEDURE — 36415 COLL VENOUS BLD VENIPUNCTURE: CPT

## 2023-09-09 PROCEDURE — 6360000002 HC RX W HCPCS: Performed by: FAMILY MEDICINE

## 2023-09-09 PROCEDURE — 2580000003 HC RX 258: Performed by: FAMILY MEDICINE

## 2023-09-09 PROCEDURE — 83036 HEMOGLOBIN GLYCOSYLATED A1C: CPT

## 2023-09-09 PROCEDURE — 85025 COMPLETE CBC W/AUTO DIFF WBC: CPT

## 2023-09-09 PROCEDURE — 82962 GLUCOSE BLOOD TEST: CPT

## 2023-09-09 PROCEDURE — 84484 ASSAY OF TROPONIN QUANT: CPT

## 2023-09-09 PROCEDURE — 2060000000 HC ICU INTERMEDIATE R&B

## 2023-09-09 PROCEDURE — 83735 ASSAY OF MAGNESIUM: CPT

## 2023-09-09 PROCEDURE — 80053 COMPREHEN METABOLIC PANEL: CPT

## 2023-09-09 RX ORDER — INSULIN LISPRO 100 [IU]/ML
0-16 INJECTION, SOLUTION INTRAVENOUS; SUBCUTANEOUS
Status: DISCONTINUED | OUTPATIENT
Start: 2023-09-09 | End: 2023-09-11 | Stop reason: HOSPADM

## 2023-09-09 RX ADMIN — INSULIN GLARGINE 30 UNITS: 100 INJECTION, SOLUTION SUBCUTANEOUS at 09:21

## 2023-09-09 RX ADMIN — ARIPIPRAZOLE 20 MG: 5 TABLET ORAL at 21:14

## 2023-09-09 RX ADMIN — SERTRALINE HYDROCHLORIDE 25 MG: 50 TABLET ORAL at 00:27

## 2023-09-09 RX ADMIN — Medication 1000 UNITS: at 09:22

## 2023-09-09 RX ADMIN — ENOXAPARIN SODIUM 30 MG: 100 INJECTION SUBCUTANEOUS at 21:04

## 2023-09-09 RX ADMIN — SUCRALFATE 1 G: 1 TABLET ORAL at 09:22

## 2023-09-09 RX ADMIN — ENOXAPARIN SODIUM 30 MG: 100 INJECTION SUBCUTANEOUS at 00:27

## 2023-09-09 RX ADMIN — FAMOTIDINE 20 MG: 20 TABLET ORAL at 21:01

## 2023-09-09 RX ADMIN — ENOXAPARIN SODIUM 30 MG: 100 INJECTION SUBCUTANEOUS at 09:21

## 2023-09-09 RX ADMIN — ARIPIPRAZOLE 20 MG: 5 TABLET ORAL at 10:00

## 2023-09-09 RX ADMIN — SODIUM CHLORIDE, PRESERVATIVE FREE 10 ML: 5 INJECTION INTRAVENOUS at 00:29

## 2023-09-09 RX ADMIN — FAMOTIDINE 20 MG: 20 TABLET ORAL at 09:22

## 2023-09-09 RX ADMIN — EMPAGLIFLOZIN 10 MG: 10 TABLET, FILM COATED ORAL at 09:23

## 2023-09-09 RX ADMIN — SUCRALFATE 1 G: 1 TABLET ORAL at 00:27

## 2023-09-09 RX ADMIN — PREGABALIN 150 MG: 150 CAPSULE ORAL at 21:00

## 2023-09-09 RX ADMIN — SERTRALINE HYDROCHLORIDE 25 MG: 50 TABLET ORAL at 21:01

## 2023-09-09 RX ADMIN — LOSARTAN POTASSIUM 100 MG: 50 TABLET, FILM COATED ORAL at 09:22

## 2023-09-09 RX ADMIN — INSULIN GLARGINE 30 UNITS: 100 INJECTION, SOLUTION SUBCUTANEOUS at 00:28

## 2023-09-09 RX ADMIN — SUCRALFATE 1 G: 1 TABLET ORAL at 21:00

## 2023-09-09 RX ADMIN — ACETAMINOPHEN 650 MG: 325 TABLET ORAL at 00:27

## 2023-09-09 RX ADMIN — ASPIRIN 81 MG: 81 TABLET, COATED ORAL at 09:21

## 2023-09-09 RX ADMIN — ATORVASTATIN CALCIUM 20 MG: 20 TABLET, FILM COATED ORAL at 09:22

## 2023-09-09 RX ADMIN — SODIUM CHLORIDE, PRESERVATIVE FREE 10 ML: 5 INJECTION INTRAVENOUS at 21:05

## 2023-09-09 RX ADMIN — PREGABALIN 150 MG: 150 CAPSULE ORAL at 00:27

## 2023-09-09 RX ADMIN — ISOSORBIDE MONONITRATE 30 MG: 30 TABLET, EXTENDED RELEASE ORAL at 09:22

## 2023-09-09 RX ADMIN — SODIUM CHLORIDE, PRESERVATIVE FREE 5 ML: 5 INJECTION INTRAVENOUS at 09:23

## 2023-09-09 RX ADMIN — CLOPIDOGREL BISULFATE 75 MG: 75 TABLET ORAL at 09:23

## 2023-09-09 RX ADMIN — FAMOTIDINE 20 MG: 20 TABLET ORAL at 00:27

## 2023-09-09 RX ADMIN — ACETAMINOPHEN 650 MG: 325 TABLET ORAL at 21:00

## 2023-09-09 ASSESSMENT — PAIN SCALES - GENERAL
PAINLEVEL_OUTOF10: 8
PAINLEVEL_OUTOF10: 0
PAINLEVEL_OUTOF10: 0
PAINLEVEL_OUTOF10: 3

## 2023-09-09 ASSESSMENT — PAIN DESCRIPTION - DESCRIPTORS: DESCRIPTORS: ACHING

## 2023-09-09 ASSESSMENT — PAIN DESCRIPTION - LOCATION: LOCATION: HEAD

## 2023-09-09 NOTE — ED PROVIDER NOTES
provider specified. DISCHARGE MEDICATIONS:     Medication List        ASK your doctor about these medications      ARIPiprazole 20 MG tablet  Commonly known as: ABILIFY     aspirin 81 MG EC tablet     atorvastatin 20 MG tablet  Commonly known as: LIPITOR     clopidogrel 75 MG tablet  Commonly known as: PLAVIX  Take 1 tablet by mouth daily     empagliflozin 10 MG tablet  Commonly known as: JARDIANCE     famotidine 20 MG tablet  Commonly known as: PEPCID     hydrOXYzine HCl 50 MG tablet  Commonly known as: ATARAX     Incruse Ellipta 62.5 MCG/ACT inhaler  Generic drug: umeclidinium bromide     insulin glargine 100 UNIT/ML injection vial  Commonly known as: LANTUS     isosorbide mononitrate 30 MG extended release tablet  Commonly known as: IMDUR     Januvia 100 MG tablet  Generic drug: SITagliptin     losartan 100 MG tablet  Commonly known as: COZAAR     nitroGLYCERIN 0.4 MG SL tablet  Commonly known as: NITROSTAT     pregabalin 150 MG capsule  Commonly known as: LYRICA     sertraline 25 MG tablet  Commonly known as: ZOLOFT     sucralfate 1 GM tablet  Commonly known as: CARAFATE     tolterodine 4 MG extended release capsule  Commonly known as: DETROL LA     vitamin D 25 MCG (1000 UT) Caps                DISCONTINUED MEDICATIONS:  Current Discharge Medication List          I am the Primary Clinician of Record: Zay Martinez DO (electronically signed)    (Please note that parts of this dictation were completed with voice recognition software. Quite often unanticipated grammatical, syntax, homophones, and other interpretive errors are inadvertently transcribed by the computer software. Please disregards these errors.  Please excuse any errors that have escaped final proofreading.)     Zay Martinez DO  09/08/23 3111

## 2023-09-09 NOTE — CONSULTS
CARDIOLOGY CONSULTATION      REASON FOR CONSULT: Chest Pain    REQUESTING PROVIDER: ER    CHIEF COMPLAINT:  Chest Pain    HISTORY OF PRESENT ILLNESS:  Carin Montoya is a 68y.o. year-old female with past medical history significant for CAD s/p PCI 2 days ago, discharged yesterday from hospital. She went home yesterday and had some chest pain at rest and some tooth pain. She didn't want to take chances she says so she called EMS. Here her troponins are low level 300s and flat X3. No chest pain currently. INPATIENT MEDICATIONS:  Home medications reviewed.     Current Facility-Administered Medications:     insulin lispro (HUMALOG) injection vial 0-16 Units, 0-16 Units, SubCUTAneous, TID WC, Faustino Jorge MD    ARIPiprazole (ABILIFY) tablet 20 mg, 20 mg, Oral, BID, Faustino Jorge MD    aspirin EC tablet 81 mg, 81 mg, Oral, Daily, Faustino Jorge MD, 81 mg at 09/09/23 8500    atorvastatin (LIPITOR) tablet 20 mg, 20 mg, Oral, Daily, Faustino Jorge MD, 20 mg at 09/09/23 7855    clopidogrel (PLAVIX) tablet 75 mg, 75 mg, Oral, Daily, Faustino Jorge MD, 75 mg at 09/09/23 9166    empagliflozin (JARDIANCE) tablet 10 mg, 10 mg, Oral, Daily, Faustino Jorge MD, 10 mg at 09/09/23 3392    famotidine (PEPCID) tablet 20 mg, 20 mg, Oral, BID, Faustino Jorge MD, 20 mg at 09/09/23 1966    hydrOXYzine HCl (ATARAX) tablet 50 mg, 50 mg, Oral, Nightly PRN, Kiko Jorge MD    insulin glargine (LANTUS) injection vial 30 Units, 30 Units, SubCUTAneous, BID, Faustino Jorge MD, 30 Units at 09/09/23 2000    isosorbide mononitrate (IMDUR) extended release tablet 30 mg, 30 mg, Oral, Daily, Faustino Jorge MD, 30 mg at 09/09/23 4884    losartan (COZAAR) tablet 100 mg, 100 mg, Oral, Daily, Faustino Jorge MD, 100 mg at 09/09/23 9673    nitroGLYCERIN (NITROSTAT) SL tablet 0.4 mg, 0.4 mg, SubLINGual, Q5 Min PRN, Faustino Jorge MD    pregabalin (LYRICA) capsule 150 mg, 150 mg, Oral, Nightly, Faustino Jorge MD, 150 mg at

## 2023-09-09 NOTE — H&P
Basophils % 0 0 - 1 %    Immature Granulocytes 1 (H) 0 - 0.5 %    Neutrophils Absolute 8.2 (H) 1.8 - 8.0 K/UL    Lymphocytes Absolute 2.7 0.8 - 3.5 K/UL    Monocytes Absolute 0.8 0.0 - 1.0 K/UL    Eosinophils Absolute 0.1 0.0 - 0.4 K/UL    Basophils Absolute 0.0 0.0 - 0.1 K/UL    Absolute Immature Granulocyte 0.1 (H) 0.00 - 0.04 K/UL    Differential Type AUTOMATED     Comprehensive Metabolic Panel    Collection Time: 09/08/23  7:51 PM   Result Value Ref Range    Sodium 141 136 - 145 mmol/L    Potassium 3.8 3.5 - 5.1 mmol/L    Chloride 107 97 - 108 mmol/L    CO2 26 21 - 32 mmol/L    Anion Gap 8 5 - 15 mmol/L    Glucose 249 (H) 65 - 100 mg/dL    BUN 26 (H) 6 - 20 mg/dL    Creatinine 1.13 (H) 0.55 - 1.02 mg/dL    Bun/Cre Ratio 23 (H) 12 - 20      Est, Glom Filt Rate 50 (L) >60 ml/min/1.73m2    Calcium 9.1 8.5 - 10.1 mg/dL    Total Bilirubin 0.4 0.2 - 1.0 mg/dL    AST 10 (L) 15 - 37 U/L    ALT 16 12 - 78 U/L    Alk Phosphatase 77 45 - 117 U/L    Total Protein 6.8 6.4 - 8.2 g/dL    Albumin 3.4 (L) 3.5 - 5.0 g/dL    Globulin 3.4 2.0 - 4.0 g/dL    Albumin/Globulin Ratio 1.0 (L) 1.1 - 2.2     Troponin    Collection Time: 09/08/23  7:51 PM   Result Value Ref Range    Troponin, High Sensitivity 362 (HH) 0 - 51 ng/L   Brain Natriuretic Peptide    Collection Time: 09/08/23  7:51 PM   Result Value Ref Range    NT Pro- <450 pg/mL   Troponin    Collection Time: 09/08/23  9:21 PM   Result Value Ref Range    Troponin, High Sensitivity 354 (HH) 0 - 51 ng/L   POCT Glucose    Collection Time: 09/09/23 12:18 AM   Result Value Ref Range    POC Glucose 210 (H) 65 - 100 mg/dL    Performed by: Mariaelena Louie    Troponin    Collection Time: 09/09/23 12:33 AM   Result Value Ref Range    Troponin, High Sensitivity 397 (HH) 0 - 51 ng/L   POCT Glucose    Collection Time: 09/09/23  8:09 AM   Result Value Ref Range    POC Glucose 218 (H) 65 - 100 mg/dL    Performed by: Crista Pedraza    Comprehensive Metabolic Panel w/ Reflex to MG

## 2023-09-09 NOTE — CARE COORDINATION
09/09/23 5074   Service Assessment   Patient Orientation Alert and Oriented   Cognition Alert   History Provided By Patient   Primary Caregiver Self   Accompanied By/Relationship Patient alone in room. [address] is: Patient Declined (Legal Next of Kin Remains as Decision Maker)   PCP Verified by CM Yes   Last Visit to PCP Within last 3 months   Prior Functional Level Independent in ADLs/IADLs   Current Functional Level Independent in ADLs/IADLs   Can patient return to prior living arrangement Yes   Ability to make needs known: Good   Family able to assist with home care needs: Other (comment)  (Patient lives in an residential.)   Would you like for me to discuss the discharge plan with any other family members/significant others, and if so, who? Yes  Yanelizhao Lopez, daughter)   Financial Resources Medicare;Medicaid   Community Resources None   Social/Functional History   Lives With Alone   Type of [address] One level   Home Access Level entry   Home Equipment Cane;Rollator; Wheelchair-electric   Active  No   Patient's  Info Medicaid transportation or daughter   [place] [email or web address]   Occupation Retired   Discharge Planning   Type of [facility] Prior To Admission [facility]   Current DME Prior to [employer]; Wheelchair;Walker   Patient expects to be discharged to: Assisted living   [address] [facility] Discharge   Services At/After Discharge Assisted living   Confirm Follow Up Transport Wheelchair [facility]       Readmission Assessment  Number of Days since last admission?: 8-30 days  Previous Disposition: Home with Family  Who is being Interviewed: Patient  What was the patient's/caregiver's perception as to why they think they needed to return back to the hospital?: Not enough help at home  Did you see a specialist, such as Cardiac, Pulmonary, Orthopedic Physician,

## 2023-09-10 LAB
GLUCOSE BLD STRIP.AUTO-MCNC: 160 MG/DL (ref 65–100)
GLUCOSE BLD STRIP.AUTO-MCNC: 177 MG/DL (ref 65–100)
GLUCOSE BLD STRIP.AUTO-MCNC: 188 MG/DL (ref 65–100)
GLUCOSE BLD STRIP.AUTO-MCNC: 205 MG/DL (ref 65–100)
PERFORMED BY:: ABNORMAL

## 2023-09-10 PROCEDURE — 6360000002 HC RX W HCPCS: Performed by: FAMILY MEDICINE

## 2023-09-10 PROCEDURE — 82962 GLUCOSE BLOOD TEST: CPT

## 2023-09-10 PROCEDURE — 6370000000 HC RX 637 (ALT 250 FOR IP): Performed by: FAMILY MEDICINE

## 2023-09-10 PROCEDURE — 2060000000 HC ICU INTERMEDIATE R&B

## 2023-09-10 PROCEDURE — 2580000003 HC RX 258: Performed by: FAMILY MEDICINE

## 2023-09-10 RX ADMIN — ATORVASTATIN CALCIUM 20 MG: 20 TABLET, FILM COATED ORAL at 09:22

## 2023-09-10 RX ADMIN — INSULIN GLARGINE 30 UNITS: 100 INJECTION, SOLUTION SUBCUTANEOUS at 21:52

## 2023-09-10 RX ADMIN — ISOSORBIDE MONONITRATE 30 MG: 30 TABLET, EXTENDED RELEASE ORAL at 09:22

## 2023-09-10 RX ADMIN — SODIUM CHLORIDE, PRESERVATIVE FREE 10 ML: 5 INJECTION INTRAVENOUS at 09:22

## 2023-09-10 RX ADMIN — ASPIRIN 81 MG: 81 TABLET, COATED ORAL at 09:22

## 2023-09-10 RX ADMIN — ENOXAPARIN SODIUM 30 MG: 100 INJECTION SUBCUTANEOUS at 09:21

## 2023-09-10 RX ADMIN — ARIPIPRAZOLE 20 MG: 5 TABLET ORAL at 09:25

## 2023-09-10 RX ADMIN — CLOPIDOGREL BISULFATE 75 MG: 75 TABLET ORAL at 09:22

## 2023-09-10 RX ADMIN — FAMOTIDINE 20 MG: 20 TABLET ORAL at 21:46

## 2023-09-10 RX ADMIN — Medication 1000 UNITS: at 09:22

## 2023-09-10 RX ADMIN — ENOXAPARIN SODIUM 30 MG: 100 INJECTION SUBCUTANEOUS at 21:45

## 2023-09-10 RX ADMIN — SODIUM CHLORIDE, PRESERVATIVE FREE 10 ML: 5 INJECTION INTRAVENOUS at 21:59

## 2023-09-10 RX ADMIN — SUCRALFATE 1 G: 1 TABLET ORAL at 09:22

## 2023-09-10 RX ADMIN — EMPAGLIFLOZIN 10 MG: 10 TABLET, FILM COATED ORAL at 09:25

## 2023-09-10 RX ADMIN — FAMOTIDINE 20 MG: 20 TABLET ORAL at 09:22

## 2023-09-10 RX ADMIN — INSULIN GLARGINE 30 UNITS: 100 INJECTION, SOLUTION SUBCUTANEOUS at 09:21

## 2023-09-10 RX ADMIN — ACETAMINOPHEN 650 MG: 325 TABLET ORAL at 16:23

## 2023-09-10 RX ADMIN — SERTRALINE HYDROCHLORIDE 25 MG: 50 TABLET ORAL at 21:46

## 2023-09-10 RX ADMIN — SUCRALFATE 1 G: 1 TABLET ORAL at 21:46

## 2023-09-10 RX ADMIN — PREGABALIN 150 MG: 150 CAPSULE ORAL at 21:46

## 2023-09-10 RX ADMIN — ARIPIPRAZOLE 20 MG: 5 TABLET ORAL at 21:46

## 2023-09-10 RX ADMIN — LOSARTAN POTASSIUM 100 MG: 50 TABLET, FILM COATED ORAL at 09:22

## 2023-09-10 ASSESSMENT — PAIN SCALES - GENERAL
PAINLEVEL_OUTOF10: 0
PAINLEVEL_OUTOF10: 0
PAINLEVEL_OUTOF10: 6
PAINLEVEL_OUTOF10: 0
PAINLEVEL_OUTOF10: 0

## 2023-09-10 ASSESSMENT — PAIN DESCRIPTION - DESCRIPTORS: DESCRIPTORS: ACHING

## 2023-09-10 ASSESSMENT — PAIN DESCRIPTION - LOCATION: LOCATION: HEAD

## 2023-09-10 ASSESSMENT — PAIN DESCRIPTION - ORIENTATION: ORIENTATION: POSTERIOR

## 2023-09-10 NOTE — PLAN OF CARE
Problem: Pain  Goal: Verbalizes/displays adequate comfort level or baseline comfort level  Outcome: Progressing     Problem: Safety - Adult  Goal: Free from fall injury  Outcome: Progressing     Problem: Discharge Planning  Goal: Discharge to home or other facility with appropriate resources  Recent Flowsheet Documentation  Taken 9/10/2023 0800 by Kalyani Busch RN  Discharge to home or other facility with appropriate resources: Identify barriers to discharge with patient and caregiver

## 2023-09-10 NOTE — DISCHARGE INSTRUCTIONS
Discharge Instructions       PATIENT ID: Carin Montoya  MRN: [id number]   YOB: 1945    DATE OF ADMISSION: 9/8/2023  DATE OF DISCHARGE: 9/10/2023    PRIMARY CARE PROVIDER: [unfilled]     ATTENDING PHYSICIAN: Esau Trotter MD   DISCHARGING PROVIDER: Esau Trotter MD    To contact this individual call [phone] and ask the  to page. If unavailable, ask to be transferred the Adult Hospitalist Department. DISCHARGE DIAGNOSES atypical chest pain    CONSULTATIONS: [unfilled]      PROCEDURES/SURGERIES: * No surgery found *    PENDING TEST RESULTS:   At the time of discharge the following test results are still pending: None    FOLLOW UP APPOINTMENTS:   Jessica Irby MD  [address] [place] [address]  [phone]    Schedule an appointment as soon as possible for a visit in 1 week(s)         ADDITIONAL CARE RECOMMENDATIONS: Follow-up with the cardiology    DIET: Resume previous diet      ACTIVITY: Activity as tolerated    Wound care: {Discharge Wound Care Instructions:64201}    EQUIPMENT needed: ***      DISCHARGE MEDICATIONS:   See Medication Reconciliation Form    It is important that you take the medication exactly as they are prescribed. Keep your medication in the bottles provided by the pharmacist and keep a list of the medication names, dosages, and times to be taken in your wallet. Do not take other medications without consulting your doctor. NOTIFY YOUR PHYSICIAN FOR ANY OF THE FOLLOWING:   Fever over 101 degrees for 24 hours. Chest pain, shortness of breath, fever, chills, nausea, vomiting, diarrhea, change in mentation, falling, weakness, bleeding. Severe pain or pain not relieved by medications. Or, any other signs or symptoms that you may have questions about.       DISPOSITION:    Home With:   OT  PT  HH  RN       SNF/Inpatient Rehab/LTAC    Independent/assisted living    Hospice    Other:         PROBLEM LIST Updated:  Yes ***

## 2023-09-11 VITALS
HEART RATE: 85 BPM | SYSTOLIC BLOOD PRESSURE: 147 MMHG | BODY MASS INDEX: 50.16 KG/M2 | HEIGHT: 58 IN | OXYGEN SATURATION: 97 % | WEIGHT: 238.98 LBS | TEMPERATURE: 97.5 F | DIASTOLIC BLOOD PRESSURE: 76 MMHG | RESPIRATION RATE: 18 BRPM

## 2023-09-11 LAB
GLUCOSE BLD STRIP.AUTO-MCNC: 137 MG/DL (ref 65–100)
GLUCOSE BLD STRIP.AUTO-MCNC: 196 MG/DL (ref 65–100)
PERFORMED BY:: ABNORMAL
PERFORMED BY:: ABNORMAL

## 2023-09-11 PROCEDURE — 82962 GLUCOSE BLOOD TEST: CPT

## 2023-09-11 PROCEDURE — 6370000000 HC RX 637 (ALT 250 FOR IP): Performed by: FAMILY MEDICINE

## 2023-09-11 PROCEDURE — 97161 PT EVAL LOW COMPLEX 20 MIN: CPT

## 2023-09-11 PROCEDURE — 2580000003 HC RX 258: Performed by: FAMILY MEDICINE

## 2023-09-11 PROCEDURE — 6360000002 HC RX W HCPCS: Performed by: FAMILY MEDICINE

## 2023-09-11 PROCEDURE — 97530 THERAPEUTIC ACTIVITIES: CPT

## 2023-09-11 RX ADMIN — ATORVASTATIN CALCIUM 20 MG: 20 TABLET, FILM COATED ORAL at 08:48

## 2023-09-11 RX ADMIN — FAMOTIDINE 20 MG: 20 TABLET ORAL at 08:49

## 2023-09-11 RX ADMIN — ARIPIPRAZOLE 20 MG: 5 TABLET ORAL at 08:49

## 2023-09-11 RX ADMIN — SUCRALFATE 1 G: 1 TABLET ORAL at 08:48

## 2023-09-11 RX ADMIN — ENOXAPARIN SODIUM 30 MG: 100 INJECTION SUBCUTANEOUS at 08:49

## 2023-09-11 RX ADMIN — INSULIN GLARGINE 30 UNITS: 100 INJECTION, SOLUTION SUBCUTANEOUS at 08:47

## 2023-09-11 RX ADMIN — ISOSORBIDE MONONITRATE 30 MG: 30 TABLET, EXTENDED RELEASE ORAL at 08:48

## 2023-09-11 RX ADMIN — SODIUM CHLORIDE, PRESERVATIVE FREE 5 ML: 5 INJECTION INTRAVENOUS at 08:47

## 2023-09-11 RX ADMIN — EMPAGLIFLOZIN 10 MG: 10 TABLET, FILM COATED ORAL at 08:48

## 2023-09-11 RX ADMIN — CLOPIDOGREL BISULFATE 75 MG: 75 TABLET ORAL at 08:49

## 2023-09-11 RX ADMIN — Medication 1000 UNITS: at 08:49

## 2023-09-11 RX ADMIN — LOSARTAN POTASSIUM 100 MG: 50 TABLET, FILM COATED ORAL at 08:49

## 2023-09-11 RX ADMIN — ASPIRIN 81 MG: 81 TABLET, COATED ORAL at 08:49

## 2023-09-11 NOTE — DISCHARGE SUMMARY
Discharge Summary       PATIENT ID: Hansa Palmer  MRN: [id number]   YOB: 1945    DATE OF ADMISSION: 9/8/2023   DATE OF DISCHARGE:   PRIMARY CARE PROVIDER: NOE@      ATTENDING PHYSICIAN: Faustino Jorge  DISCHARGING PROVIDER: Fercho Jorge      CONSULTATIONS: IP CONSULT TO CARDIOLOGY  IP CONSULT TO CARDIOLOGY    PROCEDURES/SURGERIES: * No surgery found *    ADMITTING DIAGNOSES:    Patient Active Problem List    Diagnosis Date Noted    Status post cardiac catheterization 09/07/2023    Acute chest pain 09/22/2021    Stomach pain 03/24/2016    Hemorrhoids 03/24/2016    Muscle ache 03/24/2016    Sleep apnea 03/24/2016    Frequent headaches 03/24/2016    Sleeping difficulty 03/24/2016    Muscle pain 03/24/2016    Joint swelling 03/24/2016    Dizziness 03/24/2016    Fast heart beat 03/24/2016    Hernia, hiatal 03/24/2016    High blood pressure 03/24/2016    Multiple stiff joints 03/24/2016    Stress 03/24/2016    Anxiety 03/24/2016    Asthma 03/24/2016    Frequency of urination 03/24/2016    Constipation 03/24/2016    Blurred vision, bilateral 03/24/2016    Sinus congestion 03/24/2016    Coughing 03/24/2016    Multiple joint pain 03/24/2016    Acid indigestion 03/24/2016    Type II diabetes mellitus ([address]) 03/24/2016       DISCHARGE DIAGNOSES / PLAN:      Chest pain rule out MI  CAD status post stent  Type 2 diabetes  Hypertension  Lipidemia  Depression:         DISCHARGE MEDICATIONS:     Medication List        CONTINUE taking these medications      ARIPiprazole 20 MG tablet  Commonly known as: ABILIFY     aspirin 81 MG EC tablet     atorvastatin 20 MG tablet  Commonly known as: LIPITOR     clopidogrel 75 MG tablet  Commonly known as: PLAVIX  Take 1 tablet by mouth daily     empagliflozin 10 MG tablet  Commonly known as: JARDIANCE     famotidine 20 MG tablet  Commonly known as: PEPCID     hydrOXYzine HCl 50 MG tablet  Commonly known as: ATARAX     Incruse Ellipta 62.5 MCG/ACT inhaler  Generic
Discharge Summary       PATIENT ID: Muna Mercado  MRN: [id number]   YOB: 1945    DATE OF ADMISSION: 9/8/2023   DATE OF DISCHARGE:   PRIMARY CARE PROVIDER: [unfilled]      ATTENDING PHYSICIAN: Faustino Jorge  DISCHARGING PROVIDER: Tami Jorge      CONSULTATIONS: IP CONSULT TO CARDIOLOGY  IP CONSULT TO CARDIOLOGY    PROCEDURES/SURGERIES: * No surgery found *    ADMITTING DIAGNOSES:    Patient Active Problem List    Diagnosis Date Noted    Status post cardiac catheterization 09/07/2023    Acute chest pain 09/22/2021    Stomach pain 03/24/2016    Hemorrhoids 03/24/2016    Muscle ache 03/24/2016    Sleep apnea 03/24/2016    Frequent headaches 03/24/2016    Sleeping difficulty 03/24/2016    Muscle pain 03/24/2016    Joint swelling 03/24/2016    Dizziness 03/24/2016    Fast heart beat 03/24/2016    Hernia, hiatal 03/24/2016    High blood pressure 03/24/2016    Multiple stiff joints 03/24/2016    Stress 03/24/2016    Anxiety 03/24/2016    Asthma 03/24/2016    Frequency of urination 03/24/2016    Constipation 03/24/2016    Blurred vision, bilateral 03/24/2016    Sinus congestion 03/24/2016    Coughing 03/24/2016    Multiple joint pain 03/24/2016    Acid indigestion 03/24/2016    Type II diabetes mellitus ([address]) 03/24/2016       DISCHARGE DIAGNOSES / PLAN:      Chest pain rule out MI  CAD status post stent  Type 2 diabetes  Hypertension  Lipidemia  Depression:         DISCHARGE MEDICATIONS:     Medication List        CONTINUE taking these medications      ARIPiprazole 20 MG tablet  Commonly known as: ABILIFY     aspirin 81 MG EC tablet     atorvastatin 20 MG tablet  Commonly known as: LIPITOR     clopidogrel 75 MG tablet  Commonly known as: PLAVIX  Take 1 tablet by mouth daily     empagliflozin 10 MG tablet  Commonly known as: JARDIANCE     famotidine 20 MG tablet  Commonly known as: PEPCID     hydrOXYzine HCl 50 MG tablet  Commonly known as: ATARAX     Incruse Ellipta 62.5 MCG/ACT inhaler  Generic
drug: umeclidinium bromide     insulin glargine 100 UNIT/ML injection vial  Commonly known as: LANTUS     isosorbide mononitrate 30 MG extended release tablet  Commonly known as: IMDUR     Januvia 100 MG tablet  Generic drug: SITagliptin     losartan 100 MG tablet  Commonly known as: COZAAR     nitroGLYCERIN 0.4 MG SL tablet  Commonly known as: NITROSTAT     pregabalin 150 MG capsule  Commonly known as: LYRICA     sertraline 25 MG tablet  Commonly known as: ZOLOFT     sucralfate 1 GM tablet  Commonly known as: CARAFATE     tolterodine 4 MG extended release capsule  Commonly known as: DETROL LA     vitamin D 25 MCG (1000 UT) Caps                  NOTIFY YOUR PHYSICIAN FOR ANY OF THE FOLLOWING:   Fever over 101 degrees for 24 hours. Chest pain, shortness of breath, fever, chills, nausea, vomiting, diarrhea, change in mentation, falling, weakness, bleeding. Severe pain or pain not relieved by medications. Or, any other signs or symptoms that you may have questions about. DISPOSITION:  x  Home With:   OT  PT  HH  RN       Long term SNF/Inpatient Rehab    Independent/assisted living    Hospice    Other:       PATIENT CONDITION AT DISCHARGE: Stable      PHYSICAL EXAMINATION AT DISCHARGE:  General:          Alert, cooperative, no distress, appears stated age. HEENT:           Atraumatic, anicteric sclerae, pink conjunctivae                          No oral ulcers, mucosa moist, throat clear, dentition fair  Neck:               Supple, symmetrical  Lungs:             Clear to auscultation bilaterally. No Wheezing or Rhonchi. No rales. Chest wall:      No tenderness  No Accessory muscle use. Heart:              Regular  rhythm,  No  murmur   No edema  Abdomen:        Soft, non-tender. Not distended. Bowel sounds normal  Extremities:     No cyanosis. No clubbing,                            Skin turgor normal, Capillary refill normal  Skin:                Not pale.   Not Jaundiced  No rashes   Psych:

## 2023-09-12 LAB
EKG ATRIAL RATE: 100 BPM
EKG DIAGNOSIS: NORMAL
EKG P AXIS: 66 DEGREES
EKG P-R INTERVAL: 186 MS
EKG Q-T INTERVAL: 326 MS
EKG QRS DURATION: 76 MS
EKG QTC CALCULATION (BAZETT): 420 MS
EKG R AXIS: 45 DEGREES
EKG T AXIS: -30 DEGREES
EKG VENTRICULAR RATE: 100 BPM

## 2023-10-10 ENCOUNTER — HOSPITAL ENCOUNTER (OUTPATIENT)
Facility: HOSPITAL | Age: 78
Setting detail: RECURRING SERIES
Discharge: HOME | End: 2023-10-13

## 2023-10-10 NOTE — CARDIO/PULMONARY
[address] arrived at [id number] for cardiac rehab intake. Upon checking in patient became agitated with  that would have to exercise today. Paperwork assistance by therapist supervisor and aided in calming down. When taken back for intake educated about cardiac rehab program, and patient expressed not being interested. Further explained 36 visits, 2-3x week, and classes. Patient states has done this before in [place] for a stent, but now has more pain especially in knees and does not want to exercise. Also, patient lives in assisted living so stated transportation will be an issue as well. Medications and meals prepared for her at assisted living. Informed patient that referral is valid for a while, so gave a business card, and to call us to schedule again if she changes her mind. Transportation called at [employer] awaiting ride. Breath sounds clear and equal bilaterally.

## 2024-10-16 ENCOUNTER — OFFICE VISIT (OUTPATIENT)
Facility: CLINIC | Age: 79
End: 2024-10-16

## 2024-10-16 ENCOUNTER — HOSPITAL ENCOUNTER (OUTPATIENT)
Facility: HOSPITAL | Age: 79
Discharge: HOME | End: 2024-10-18
Payer: MEDICARE

## 2024-10-16 VITALS
HEART RATE: 77 BPM | SYSTOLIC BLOOD PRESSURE: 166 MMHG | BODY MASS INDEX: 53.29 KG/M2 | WEIGHT: 247 LBS | DIASTOLIC BLOOD PRESSURE: 94 MMHG | HEIGHT: 57 IN | TEMPERATURE: 97.6 F | OXYGEN SATURATION: 96 %

## 2024-10-16 DIAGNOSIS — Z79.4 TYPE 2 DIABETES MELLITUS WITH DIABETIC POLYNEUROPATHY, WITH LONG-TERM CURRENT USE OF INSULIN (HCC): ICD-10-CM

## 2024-10-16 DIAGNOSIS — Z78.0 MENOPAUSE: ICD-10-CM

## 2024-10-16 DIAGNOSIS — I10 PRIMARY HYPERTENSION: ICD-10-CM

## 2024-10-16 DIAGNOSIS — I10 PRIMARY HYPERTENSION: Primary | ICD-10-CM

## 2024-10-16 DIAGNOSIS — K21.00 GASTROESOPHAGEAL REFLUX DISEASE WITH ESOPHAGITIS WITHOUT HEMORRHAGE: ICD-10-CM

## 2024-10-16 DIAGNOSIS — F41.9 ANXIETY: ICD-10-CM

## 2024-10-16 DIAGNOSIS — Z28.39 IMMUNIZATIONS INCOMPLETE: ICD-10-CM

## 2024-10-16 DIAGNOSIS — Z12.4 CERVICAL CANCER SCREENING: ICD-10-CM

## 2024-10-16 DIAGNOSIS — I25.10 CORONARY ARTERY DISEASE INVOLVING NATIVE HEART, UNSPECIFIED VESSEL OR LESION TYPE, UNSPECIFIED WHETHER ANGINA PRESENT: ICD-10-CM

## 2024-10-16 DIAGNOSIS — E11.42 TYPE 2 DIABETES MELLITUS WITH DIABETIC POLYNEUROPATHY, WITH LONG-TERM CURRENT USE OF INSULIN (HCC): ICD-10-CM

## 2024-10-16 DIAGNOSIS — Z12.11 ENCOUNTER FOR COLORECTAL CANCER SCREENING: ICD-10-CM

## 2024-10-16 DIAGNOSIS — M79.7 FIBROMYALGIA: ICD-10-CM

## 2024-10-16 DIAGNOSIS — E78.5 HYPERLIPIDEMIA, UNSPECIFIED HYPERLIPIDEMIA TYPE: ICD-10-CM

## 2024-10-16 DIAGNOSIS — H25.9 AGE-RELATED CATARACT OF BOTH EYES, UNSPECIFIED AGE-RELATED CATARACT TYPE: ICD-10-CM

## 2024-10-16 DIAGNOSIS — M17.0 PRIMARY OSTEOARTHRITIS OF BOTH KNEES: ICD-10-CM

## 2024-10-16 DIAGNOSIS — Z12.12 ENCOUNTER FOR COLORECTAL CANCER SCREENING: ICD-10-CM

## 2024-10-16 DIAGNOSIS — Z78.9 HEPATITIS B IMMUNE: ICD-10-CM

## 2024-10-16 DIAGNOSIS — Z12.31 BREAST CANCER SCREENING BY MAMMOGRAM: ICD-10-CM

## 2024-10-16 DIAGNOSIS — G62.9 PERIPHERAL POLYNEUROPATHY: ICD-10-CM

## 2024-10-16 DIAGNOSIS — N32.81 OVERACTIVE BLADDER: ICD-10-CM

## 2024-10-16 DIAGNOSIS — Z13.820 ENCOUNTER FOR SCREENING FOR OSTEOPOROSIS: ICD-10-CM

## 2024-10-16 LAB
EKG ATRIAL RATE: 105 BPM
EKG DIAGNOSIS: NORMAL
EKG P AXIS: 36 DEGREES
EKG P-R INTERVAL: 184 MS
EKG Q-T INTERVAL: 316 MS
EKG QRS DURATION: 88 MS
EKG QTC CALCULATION (BAZETT): 417 MS
EKG R AXIS: 15 DEGREES
EKG T AXIS: 27 DEGREES
EKG VENTRICULAR RATE: 105 BPM

## 2024-10-16 PROCEDURE — 93005 ELECTROCARDIOGRAM TRACING: CPT

## 2024-10-16 RX ORDER — HYDROCORTISONE 10 MG/G
CREAM TOPICAL AS NEEDED
COMMUNITY

## 2024-10-16 RX ORDER — ACETAMINOPHEN 500 MG
500 TABLET ORAL EVERY 6 HOURS PRN
COMMUNITY
Start: 2023-11-16

## 2024-10-16 RX ORDER — LATANOPROST 50 UG/ML
1 SOLUTION/ DROPS OPHTHALMIC NIGHTLY
COMMUNITY
Start: 2024-09-16

## 2024-10-16 RX ORDER — BLOOD SUGAR DIAGNOSTIC
1 STRIP MISCELLANEOUS DAILY
COMMUNITY
Start: 2024-09-25

## 2024-10-16 RX ORDER — INSULIN LISPRO 100 [IU]/ML
INJECTION, SOLUTION INTRAVENOUS; SUBCUTANEOUS
COMMUNITY
Start: 2024-07-30

## 2024-10-16 RX ORDER — LOSARTAN POTASSIUM AND HYDROCHLOROTHIAZIDE 12.5; 1 MG/1; MG/1
1 TABLET ORAL DAILY
Qty: 90 TABLET | Refills: 1 | Status: SHIPPED | OUTPATIENT
Start: 2024-10-16

## 2024-10-16 RX ORDER — PEN NEEDLE, DIABETIC 32GX 5/32"
1 NEEDLE, DISPOSABLE MISCELLANEOUS
COMMUNITY
Start: 2024-09-09

## 2024-10-16 ASSESSMENT — ENCOUNTER SYMPTOMS
ABDOMINAL DISTENTION: 0
SHORTNESS OF BREATH: 0
ABDOMINAL PAIN: 0
DIARRHEA: 0
SORE THROAT: 0
COUGH: 0
WHEEZING: 0
VOMITING: 0
NAUSEA: 0
CONSTIPATION: 0

## 2024-10-16 ASSESSMENT — PATIENT HEALTH QUESTIONNAIRE - PHQ9
SUM OF ALL RESPONSES TO PHQ QUESTIONS 1-9: 0
1. LITTLE INTEREST OR PLEASURE IN DOING THINGS: NOT AT ALL
SUM OF ALL RESPONSES TO PHQ9 QUESTIONS 1 & 2: 0
2. FEELING DOWN, DEPRESSED OR HOPELESS: NOT AT ALL

## 2024-10-16 NOTE — PROGRESS NOTES
[facility]  [address], [place] [address]  Phone: [phone]      Nazia Garduno (: 1945) is a 78 y.o. female, new patient with history of T2DM A1c 8.5 on insulin, CAD with stent, HLD, peripheral neuropathy, OAB, primary hypertension, anxiety, OA of bilateral knees, here for for establishing care  New Patient     Previous PCP in [facility]  Dr Stacy in [facility]-says last visit with him was 2024   Pt signed medical release form today  In assisted living- [facility]   Says good mood, says she reads bible at assisted [facility]     Present with daughter today  Has 5 daughters and 2 sons     Says she was in a coma  for 2 weeks says she does not remember why she was in coma or what lead to It    Blood pressure elevated today says she took anti hypertensives this morning   Says she is anxious  Denies any f,chills,chest pains,sob,abd pain,nausea,vomiting,changes to BH    T2DM insulin  Diagnosed over around   A1c: 8.5 2023  9.3 2023    Regimen:    Januvia 100 mg daily  Jardiance 10 mg daily  Insulin glargine 32 units twice daily  Also takes humalog sliding scale  Does not have sliding scale     Blood glucose:  At assisted [facility] they check her sugars and administer humalog    Complications:  Polyuria, polydipsia:  Says yes polyuria but also OAB present       Hypoglycemia episodes:  Says it does not happen often  Says when it does she feels lightheaded  Says once a week but corrects with food     Hx DKA/hyperosmolar hyperglycemic state  She reports no    Microalbumin/cr ratio:    Annual Retinal eye exam:  [facility]  Wears corrective glasses  Says she follows closely annually   And some mild DR in right eye   Seeis opthalmology Dr. Hylton in [place]  But referred to another one in [facility] his associated  Says she has had bilateral cataract surgery     Peripheral Neuropathy: Present, Lyrica 150 mg

## 2024-10-16 NOTE — PROGRESS NOTES
Chief Complaint   Patient presents with    New Patient    BP (!) 159/77 (Site: Right Lower Arm, Position: Sitting, Cuff Size: Large Adult) Comment: recheck bp  Pulse 77   Temp 97.6 °F (36.4 °C) (Oral)   Ht 1.448 m (4' 9\")   Wt 112 kg (247 lb)   SpO2 96%   BMI 53.45 kg/m²  1. Have you been to the ER, urgent care clinic since your last visit?  Hospitalized since your last visit?Yes [facility]    2. Have you seen or consulted any other health care providers outside of the [facility] since your last visit?  Include any pap smears or colon screening. Yes Where: [facility]

## 2024-10-24 LAB
ALBUMIN SERPL-MCNC: 4.4 G/DL (ref 3.8–4.8)
ALBUMIN/CREAT UR: 39 MG/G CREAT (ref 0–29)
ALP SERPL-CCNC: 88 IU/L (ref 44–121)
ALT SERPL-CCNC: 14 IU/L (ref 0–32)
AST SERPL-CCNC: 17 IU/L (ref 0–40)
BASOPHILS # BLD AUTO: 0 X10E3/UL (ref 0–0.2)
BASOPHILS NFR BLD AUTO: 1 %
BILIRUB SERPL-MCNC: 0.5 MG/DL (ref 0–1.2)
BUN SERPL-MCNC: 19 MG/DL (ref 8–27)
BUN/CREAT SERPL: 17 (ref 12–28)
CALCIUM SERPL-MCNC: 10.5 MG/DL (ref 8.7–10.3)
CHLORIDE SERPL-SCNC: 98 MMOL/L (ref 96–106)
CHOLEST SERPL-MCNC: 149 MG/DL (ref 100–199)
CO2 SERPL-SCNC: 23 MMOL/L (ref 20–29)
CREAT SERPL-MCNC: 1.12 MG/DL (ref 0.57–1)
CREAT UR-MCNC: 147.8 MG/DL
EGFRCR SERPLBLD CKD-EPI 2021: 50 ML/MIN/1.73
EOSINOPHIL # BLD AUTO: 0.1 X10E3/UL (ref 0–0.4)
EOSINOPHIL NFR BLD AUTO: 1 %
ERYTHROCYTE [DISTWIDTH] IN BLOOD BY AUTOMATED COUNT: 14.1 % (ref 11.7–15.4)
GLOBULIN SER CALC-MCNC: 3.4 G/DL (ref 1.5–4.5)
GLUCOSE SERPL-MCNC: 183 MG/DL (ref 70–99)
HBA1C MFR BLD: 10.8 % (ref 4.8–5.6)
HCT VFR BLD AUTO: 43.9 % (ref 34–46.6)
HCV IGG SERPL QL IA: NON REACTIVE
HDLC SERPL-MCNC: 62 MG/DL
HGB BLD-MCNC: 14.1 G/DL (ref 11.1–15.9)
IMM GRANULOCYTES # BLD AUTO: 0 X10E3/UL (ref 0–0.1)
IMM GRANULOCYTES NFR BLD AUTO: 1 %
LDLC SERPL CALC-MCNC: 68 MG/DL (ref 0–99)
LYMPHOCYTES # BLD AUTO: 1.9 X10E3/UL (ref 0.7–3.1)
LYMPHOCYTES NFR BLD AUTO: 31 %
MCH RBC QN AUTO: 29.1 PG (ref 26.6–33)
MCHC RBC AUTO-ENTMCNC: 32.1 G/DL (ref 31.5–35.7)
MCV RBC AUTO: 91 FL (ref 79–97)
MICROALBUMIN UR-MCNC: 58 UG/ML
MONOCYTES # BLD AUTO: 0.4 X10E3/UL (ref 0.1–0.9)
MONOCYTES NFR BLD AUTO: 7 %
NEUTROPHILS # BLD AUTO: 3.8 X10E3/UL (ref 1.4–7)
NEUTROPHILS NFR BLD AUTO: 59 %
PLATELET # BLD AUTO: 228 X10E3/UL (ref 150–450)
POTASSIUM SERPL-SCNC: 4.4 MMOL/L (ref 3.5–5.2)
PROT SERPL-MCNC: 7.8 G/DL (ref 6–8.5)
RBC # BLD AUTO: 4.85 X10E6/UL (ref 3.77–5.28)
SODIUM SERPL-SCNC: 137 MMOL/L (ref 134–144)
TRIGL SERPL-MCNC: 105 MG/DL (ref 0–149)
TSH SERPL DL<=0.005 MIU/L-ACNC: 2.16 UIU/ML (ref 0.45–4.5)
VLDLC SERPL CALC-MCNC: 19 MG/DL (ref 5–40)
WBC # BLD AUTO: 6.3 X10E3/UL (ref 3.4–10.8)

## 2024-10-30 ENCOUNTER — OFFICE VISIT (OUTPATIENT)
Facility: CLINIC | Age: 79
End: 2024-10-30

## 2024-10-30 VITALS
DIASTOLIC BLOOD PRESSURE: 81 MMHG | BODY MASS INDEX: 52.86 KG/M2 | OXYGEN SATURATION: 97 % | HEART RATE: 90 BPM | SYSTOLIC BLOOD PRESSURE: 140 MMHG | WEIGHT: 245 LBS | TEMPERATURE: 97.8 F | HEIGHT: 57 IN

## 2024-10-30 DIAGNOSIS — I10 PRIMARY HYPERTENSION: ICD-10-CM

## 2024-10-30 DIAGNOSIS — R35.0 URINARY FREQUENCY: ICD-10-CM

## 2024-10-30 DIAGNOSIS — Z79.4 TYPE 2 DIABETES MELLITUS TREATED WITH INSULIN (HCC): Primary | ICD-10-CM

## 2024-10-30 DIAGNOSIS — E11.9 TYPE 2 DIABETES MELLITUS TREATED WITH INSULIN (HCC): Primary | ICD-10-CM

## 2024-10-30 RX ORDER — INSULIN GLARGINE 100 [IU]/ML
INJECTION, SOLUTION SUBCUTANEOUS NIGHTLY
COMMUNITY

## 2024-10-30 RX ORDER — LOSARTAN POTASSIUM AND HYDROCHLOROTHIAZIDE 12.5; 1 MG/1; MG/1
1 TABLET ORAL DAILY
Qty: 90 TABLET | Refills: 1 | Status: SHIPPED | OUTPATIENT
Start: 2024-10-30

## 2024-10-30 RX ORDER — ALENDRONATE SODIUM 70 MG/1
70 TABLET ORAL
COMMUNITY

## 2024-10-30 RX ORDER — SEMAGLUTIDE 1.34 MG/ML
1 INJECTION, SOLUTION SUBCUTANEOUS
COMMUNITY
End: 2024-10-30

## 2024-10-30 ASSESSMENT — ENCOUNTER SYMPTOMS
ABDOMINAL DISTENTION: 0
WHEEZING: 0
COUGH: 0
SORE THROAT: 0
SHORTNESS OF BREATH: 0
ABDOMINAL PAIN: 0
CONSTIPATION: 0
VOMITING: 0
NAUSEA: 0
DIARRHEA: 0

## 2024-10-30 NOTE — PROGRESS NOTES
Chief Complaint   Patient presents with    Follow-up Chronic Condition     Pt not sleeping due to getting up all hours to urinate    Hypertension    BP (!) 140/81 (Site: Right Lower Arm, Position: Sitting, Cuff Size: Large Adult) Comment: recheck bp  Pulse 90   Temp 97.8 °F (36.6 °C) (Oral)   Ht 1.448 m (4' 9\")   Wt 111.1 kg (245 lb)   SpO2 97%   BMI 53.02 kg/m²  1. Have you been to the ER, urgent care clinic since your last visit?  Hospitalized since your last visit?No    2. Have you seen or consulted any other health care providers outside of the [facility] since your last visit?  Include any pap smears or colon screening. No   
2023    Insert stent hao coronary performed by Tyson Whitley MD at [facility] CARDIAC CATH LAB    CATARACT REMOVAL      both eyes, cyst on left and floaters     DELIVERY ONLY       SECTION      CHOLECYSTECTOMY      COLONOSCOPY      CORONARY ANGIOPLASTY WITH STENT PLACEMENT      GYN      HYSTERECTOMY (CERVIX STATUS UNKNOWN)      [place] UNLISTED PROCEDURE CARDIAC SURGERY         Social History     Tobacco Use    Smoking status: Former     Current packs/day: 0.00     Types: Cigarettes     Quit date: 3/24/1980     Years since quittin.6    Smokeless tobacco: Never   Substance Use Topics    Alcohol use: No    Drug use: Never         Review of Systems   Constitutional:  Negative for chills, fatigue and fever.   HENT:  Negative for congestion, sneezing and sore throat.    Respiratory:  Negative for cough, shortness of breath and wheezing.    Cardiovascular:  Negative for chest pain, palpitations and leg swelling.   Gastrointestinal:  Negative for abdominal distention, abdominal pain, constipation, diarrhea, nausea and vomiting.   Genitourinary:  Negative for dysuria and hematuria.   Neurological:  Negative for dizziness.   Psychiatric/Behavioral:  Negative for confusion.        BP (!) 140/81 (Site: Right Lower Arm, Position: Sitting, Cuff Size: Large Adult) Comment: recheck bp  Pulse 90   Temp 97.8 °F (36.6 °C) (Oral)   Ht 1.448 m (4' 9\")   Wt 111.1 kg (245 lb)   SpO2 97%   BMI 53.02 kg/m²      Physical Exam  Constitutional:       General: She is not in acute distress.     Appearance: Normal appearance.   HENT:      Head: Normocephalic and atraumatic.   Eyes:      Extraocular Movements: Extraocular movements intact.      Conjunctiva/sclera: Conjunctivae normal.   Cardiovascular:      Rate and Rhythm: Normal rate and regular rhythm.      Pulses: Normal pulses.      Heart sounds: Normal heart sounds. No murmur heard.  Pulmonary:      Effort: Pulmonary effort is normal. No respiratory distress.

## 2024-11-01 ENCOUNTER — TELEPHONE (OUTPATIENT)
Facility: CLINIC | Age: 79
End: 2024-11-01

## 2024-11-01 NOTE — TELEPHONE ENCOUNTER
Copy of office note faxed to [facility] at number provided.    This should answer their questions.

## 2024-11-01 NOTE — TELEPHONE ENCOUNTER
Patient called stating she wants to know if she can do something other than insulin glargine (LANTUS) 100 UNIT/ML injection vial and   HUMALOG KWIKPEN 100 UNIT/ML SOPN  so she does not have to stick her self and stated about its an option for something that goes on her arm but the pharmacy told her she had to speak with her PCP to have it changed

## 2024-11-01 NOTE — TELEPHONE ENCOUNTER
[facility] in [facility] called stating they need a order put in for her glucose to be checked every 2 hours for them and the pharmacy.     [facility] pharmacy  [phone]  Fax [phone]    Need a list of stopped medications sent to the pharmacy as well.

## 2024-11-05 ENCOUNTER — TELEPHONE (OUTPATIENT)
Facility: CLINIC | Age: 79
End: 2024-11-05

## 2024-11-05 NOTE — TELEPHONE ENCOUNTER
Received call from [facility]  Wanting to know how many time the patient should be checking her sugar. Please call [phone] to clarify

## 2024-11-06 NOTE — TELEPHONE ENCOUNTER
[facility] and gave them orders for pt. Check blood sugar fasting, and 2 hours after each meal. Write it down to bring to her next appointment. I spoke with Meagan and she conformed these orders.

## 2024-11-07 DIAGNOSIS — M25.50 MULTIPLE JOINT PAIN: Primary | ICD-10-CM

## 2024-11-07 RX ORDER — PREGABALIN 150 MG/1
150 CAPSULE ORAL NIGHTLY
Qty: 60 CAPSULE | Refills: 0 | Status: SHIPPED | OUTPATIENT
Start: 2024-11-07 | End: 2025-01-06

## 2024-11-08 ENCOUNTER — TELEPHONE (OUTPATIENT)
Facility: CLINIC | Age: 79
End: 2024-11-08

## 2024-11-08 NOTE — TELEPHONE ENCOUNTER
Called and spoke to pt. Regarding her appointment. I have ask pt to keep her appointment and bring her blood sugar readings with her.Pt verbalized understanding.

## 2024-11-08 NOTE — TELEPHONE ENCOUNTER
----- Message from Mariana ARCOS sent at 11/8/2024  9:01 AM EST -----  Regarding: ECC Message to Provider  ECC Message to Provider    Relationship to Patient: Self     Additional Information : Patient is asking if she needs to come to her appointment on November 13, 2024 since the result of the blood glucose is today. And she wants you to call her so that she can book the transportation 2 days ahead.   --------------------------------------------------------------------------------------------------------------------------    Call Back Information: OK to leave message on voicemail  Preferred Call Back Number: Phone  [phone]

## 2024-11-08 NOTE — TELEPHONE ENCOUNTER
Patient called she needs the machine to go on her arm because she can not be pricked twice a day.

## 2024-11-13 ENCOUNTER — OFFICE VISIT (OUTPATIENT)
Facility: CLINIC | Age: 79
End: 2024-11-13

## 2024-11-13 ENCOUNTER — TELEPHONE (OUTPATIENT)
Facility: CLINIC | Age: 79
End: 2024-11-13

## 2024-11-13 VITALS
OXYGEN SATURATION: 94 % | HEART RATE: 88 BPM | BODY MASS INDEX: 54.37 KG/M2 | DIASTOLIC BLOOD PRESSURE: 79 MMHG | SYSTOLIC BLOOD PRESSURE: 134 MMHG | WEIGHT: 252 LBS | HEIGHT: 57 IN | TEMPERATURE: 97.8 F

## 2024-11-13 DIAGNOSIS — R19.7 DIARRHEA, UNSPECIFIED TYPE: ICD-10-CM

## 2024-11-13 DIAGNOSIS — E11.9 TYPE 2 DIABETES MELLITUS TREATED WITH INSULIN (HCC): ICD-10-CM

## 2024-11-13 DIAGNOSIS — F41.9 ANXIETY: ICD-10-CM

## 2024-11-13 DIAGNOSIS — R19.7 DIARRHEA, UNSPECIFIED TYPE: Primary | ICD-10-CM

## 2024-11-13 DIAGNOSIS — R58 BLOOD IN TOILET BOWL: ICD-10-CM

## 2024-11-13 DIAGNOSIS — Z79.4 TYPE 2 DIABETES MELLITUS TREATED WITH INSULIN (HCC): ICD-10-CM

## 2024-11-13 DIAGNOSIS — K64.9 HEMORRHOIDS, UNSPECIFIED HEMORRHOID TYPE: ICD-10-CM

## 2024-11-13 RX ORDER — ACYCLOVIR 400 MG/1
1 TABLET ORAL CONTINUOUS
Qty: 3 EACH | Refills: 1 | Status: SHIPPED | OUTPATIENT
Start: 2024-11-13

## 2024-11-13 RX ORDER — CHOLESTYRAMINE 4 G/9G
1 POWDER, FOR SUSPENSION ORAL 2 TIMES DAILY
Qty: 90 PACKET | Refills: 3 | Status: SHIPPED | OUTPATIENT
Start: 2024-11-13

## 2024-11-13 RX ORDER — ACYCLOVIR 400 MG/1
1 TABLET ORAL CONTINUOUS
Qty: 1 EACH | Refills: 3 | Status: SHIPPED | OUTPATIENT
Start: 2024-11-13

## 2024-11-13 ASSESSMENT — ENCOUNTER SYMPTOMS
DIARRHEA: 0
SHORTNESS OF BREATH: 0
SORE THROAT: 0
CONSTIPATION: 0
VOMITING: 0
ABDOMINAL DISTENTION: 0
ABDOMINAL PAIN: 0
NAUSEA: 0
WHEEZING: 0
COUGH: 0

## 2024-11-13 NOTE — PROGRESS NOTES
Chief Complaint   Patient presents with    Follow-Up from Hospital     Emergency room visit.    Cough     Constant cough. Got choked this am on water. Facility is suggesting swallow test.    /79 (Site: Left Upper Arm, Position: Sitting, Cuff Size: Large Adult)   Pulse 88   Temp 97.8 °F (36.6 °C) (Oral)   Ht 1.448 m (4' 9\")   Wt 114.3 kg (252 lb)   SpO2 94%   BMI 54.53 kg/m²  1. Have you been to the ER, urgent care clinic since your last visit?  Hospitalized since your last visit?Yes Where: [facility] er    2. Have you seen or consulted any other health care providers outside of the [facility] since your last visit?  Include any pap smears or colon screening. Yes Where: [facility] er

## 2024-11-13 NOTE — PROGRESS NOTES
[facility]  [address], [place] [address]  Phone: [phone]      Nazia Garduno (: 1945) is a 78 y.o. female, established patient with history of primary hypertension, T2DM A1c 10.8 here for follow up     Recently went to [facility] today 2024 for diarrhea  Reports she does notice red meat triggers diarrhea but she says at assisted living they serve red meat a lot although she will request other food that does not trigger it  She was concerned stool had blood in it, she does report constipation and having both external/internal hemorrhoids, deferred physical exam for it   Reports she thought she saw blood in toilet bowl  Says she has urology appt 2024  Documentation stated diarrhea with uncertain cause, she was prescribed loperamide and told to hydrate with sports drinks etc   She says she has had loose stools/diarrhea for years  Denies any fevers,chills,nausea,vomiting,chest pain,sob,abd pain or changes to bH    Mila nurse called assisted living facility and blood sugars were provided over the phone    Assisted living facility did send over request to stop zoloft per patients request, patient states she feels whenever she takes zoloft her stomach hurts and worsens diarrhea    Hemoglobin A1C   Date Value Ref Range Status   10/23/2024 10.8 (H) 4.8 - 5.6 % Final     Comment:                 Prediabetes: 5.7 - 6.4           Diabetes: >6.4           Glycemic control for adults with diabetes: <7.0        Hemoglobin   Date Value Ref Range Status   10/23/2024 14.1 11.1 - 15.9 g/dL Final     Hematocrit   Date Value Ref Range Status   10/23/2024 43.9 34.0 - 46.6 % Final     Creatinine   Date Value Ref Range Status   10/23/2024 1.12 (H) 0.57 - 1.00 mg/dL Final     Glucose   Date Value Ref Range Status   10/23/2024 183 (H) 70 - 99 mg/dL Final     TSH, 3rd Generation   Date Value Ref Range Status   2023 1.58 0.36 - 3.74 uIU/mL Final

## 2024-11-13 NOTE — TELEPHONE ENCOUNTER
[facility]. Directions for humalog sliding scale is not listed for 351-400.    Scale was incomplete.  Informed pharmacist to have pt take 10 units for 351-400. Thank you

## 2024-11-15 PROBLEM — Z12.11 ENCOUNTER FOR COLORECTAL CANCER SCREENING: Status: RESOLVED | Noted: 2024-10-16 | Resolved: 2024-11-15

## 2024-11-15 PROBLEM — Z12.12 ENCOUNTER FOR COLORECTAL CANCER SCREENING: Status: RESOLVED | Noted: 2024-10-16 | Resolved: 2024-11-15

## 2024-11-15 PROBLEM — Z12.4 CERVICAL CANCER SCREENING: Status: RESOLVED | Noted: 2024-10-16 | Resolved: 2024-11-15

## 2024-11-15 PROBLEM — Z13.820 ENCOUNTER FOR SCREENING FOR OSTEOPOROSIS: Status: RESOLVED | Noted: 2024-10-16 | Resolved: 2024-11-15

## 2024-11-25 ENCOUNTER — TELEPHONE (OUTPATIENT)
Facility: CLINIC | Age: 79
End: 2024-11-25

## 2024-11-26 NOTE — TELEPHONE ENCOUNTER
Della from assisted living called stating that patient feels like she has electricity like pain on her legs and arms.  Last A1c 10.8.  Denies any neurological deficit.  Patient already on Lyrica 150 mg.  Informed her that it could be secondary to diabetic neuropathy.  Electrolytes needs to be checked which has been ordered by Dr. Avalos Sayed.  If pain is not improving option of going to the emergency room discussed.

## 2024-11-27 LAB — LEFT VENTRICULAR EJECTION FRACTION, EXTERNAL: NORMAL

## 2024-12-09 ENCOUNTER — TELEPHONE (OUTPATIENT)
Facility: CLINIC | Age: 79
End: 2024-12-09

## 2024-12-09 NOTE — TELEPHONE ENCOUNTER
Received call from Emily from the group [facility] [phone]. Patient is getting nose bleeds every night. Please advise as to what she needs to do

## 2024-12-10 ENCOUNTER — TELEPHONE (OUTPATIENT)
Facility: CLINIC | Age: 79
End: 2024-12-10

## 2024-12-10 NOTE — TELEPHONE ENCOUNTER
Patient called stating that the medication she was prescribed has been making her do crazy things had a nose bleed,and she feels like she is drunk when she takes it.

## 2024-12-11 ENCOUNTER — TELEPHONE (OUTPATIENT)
Facility: CLINIC | Age: 79
End: 2024-12-11

## 2024-12-11 RX ORDER — OXYMETAZOLINE HYDROCHLORIDE 0.05 G/100ML
2 SPRAY NASAL 2 TIMES DAILY
Qty: 1 EACH | Refills: 3 | Status: SHIPPED | OUTPATIENT
Start: 2024-12-11 | End: 2025-01-10

## 2024-12-11 NOTE — TELEPHONE ENCOUNTER
For 12/10 virtual visit encounter  Medical assistant reports she called patient but patient was unable to connect to video chat or receive assistance from nurse at assisted living  Patient then was disconnected and unable to reach again  Medical assistant also tried contacting patient again as well as her daughter but could not reach anyone

## 2024-12-12 ENCOUNTER — TELEPHONE (OUTPATIENT)
Facility: CLINIC | Age: 79
End: 2024-12-12

## 2024-12-12 NOTE — TELEPHONE ENCOUNTER
I called Pt to complete VV check in. Pt stated she was alone and couldn't to a VV call on her own. I ask if I could speak to the nurse at the factuality she was at, and her response there is no nurse around her at that time. I notified Dr. Price, that the PT was unable to the VV call on her own.  I tried reaching out to her daughter and was unable to leave a detailed VM. AMM

## 2024-12-12 NOTE — TELEPHONE ENCOUNTER
Zee Price MD Bos[place], Rebecca, MA  Caller: Unspecified (3 days ago,  9:01 AM)  Do you mind calling patient tomorrow and having her go to urgent care? I will also send afrin in for her because we could not reach her on video chat

## 2025-01-29 ENCOUNTER — OFFICE VISIT (OUTPATIENT)
Age: 80
End: 2025-01-29
Payer: MEDICAID

## 2025-01-29 VITALS — HEART RATE: 109 BPM | SYSTOLIC BLOOD PRESSURE: 158 MMHG | DIASTOLIC BLOOD PRESSURE: 92 MMHG

## 2025-01-29 DIAGNOSIS — R82.81 PYURIA: ICD-10-CM

## 2025-01-29 DIAGNOSIS — N34.2 ATROPHIC URETHRITIS: ICD-10-CM

## 2025-01-29 DIAGNOSIS — N30.90 CYSTITIS: ICD-10-CM

## 2025-01-29 DIAGNOSIS — E66.01 MORBID OBESITY WITH BMI OF 50.0-59.9, ADULT: ICD-10-CM

## 2025-01-29 DIAGNOSIS — N32.81 OVERACTIVE BLADDER: ICD-10-CM

## 2025-01-29 DIAGNOSIS — E11.9 TYPE 2 DIABETES MELLITUS TREATED WITH INSULIN (HCC): ICD-10-CM

## 2025-01-29 DIAGNOSIS — R35.0 FREQUENCY OF URINATION: Primary | ICD-10-CM

## 2025-01-29 DIAGNOSIS — Z79.4 TYPE 2 DIABETES MELLITUS TREATED WITH INSULIN (HCC): ICD-10-CM

## 2025-01-29 DIAGNOSIS — B37.31 YEAST VAGINITIS: ICD-10-CM

## 2025-01-29 LAB
BILIRUBIN, URINE, POC: NEGATIVE
BLOOD URINE, POC: ABNORMAL
GLUCOSE URINE, POC: 500
KETONES, URINE, POC: NEGATIVE
LEUKOCYTE ESTERASE, URINE, POC: NEGATIVE
NITRITE, URINE, POC: NEGATIVE
PH, URINE, POC: 6 (ref 4.6–8)
PROTEIN,URINE, POC: NEGATIVE
PVR, POC: NORMAL CC
SPECIFIC GRAVITY, URINE, POC: 1.01 (ref 1–1.03)
URINALYSIS CLARITY, POC: CLEAR
URINALYSIS COLOR, POC: YELLOW
UROBILINOGEN, POC: ABNORMAL

## 2025-01-29 PROCEDURE — G8427 DOCREV CUR MEDS BY ELIG CLIN: HCPCS | Performed by: UROLOGY

## 2025-01-29 PROCEDURE — 3077F SYST BP >= 140 MM HG: CPT | Performed by: UROLOGY

## 2025-01-29 PROCEDURE — 1090F PRES/ABSN URINE INCON ASSESS: CPT | Performed by: UROLOGY

## 2025-01-29 PROCEDURE — 1159F MED LIST DOCD IN RCRD: CPT | Performed by: UROLOGY

## 2025-01-29 PROCEDURE — 1036F TOBACCO NON-USER: CPT | Performed by: UROLOGY

## 2025-01-29 PROCEDURE — 51798 US URINE CAPACITY MEASURE: CPT | Performed by: UROLOGY

## 2025-01-29 PROCEDURE — 3080F DIAST BP >= 90 MM HG: CPT | Performed by: UROLOGY

## 2025-01-29 PROCEDURE — 99204 OFFICE O/P NEW MOD 45 MIN: CPT | Performed by: UROLOGY

## 2025-01-29 PROCEDURE — 81003 URINALYSIS AUTO W/O SCOPE: CPT | Performed by: UROLOGY

## 2025-01-29 PROCEDURE — G8400 PT W/DXA NO RESULTS DOC: HCPCS | Performed by: UROLOGY

## 2025-01-29 PROCEDURE — 1124F ACP DISCUSS-NO DSCNMKR DOCD: CPT | Performed by: UROLOGY

## 2025-01-29 PROCEDURE — G8417 CALC BMI ABV UP PARAM F/U: HCPCS | Performed by: UROLOGY

## 2025-01-29 RX ORDER — ESTRADIOL 0.1 MG/G
1 CREAM VAGINAL
Qty: 42.5 G | Refills: 3 | Status: SHIPPED | OUTPATIENT
Start: 2025-01-29

## 2025-01-29 RX ORDER — DOXYCYCLINE HYCLATE 100 MG
100 TABLET ORAL 2 TIMES DAILY
Qty: 20 TABLET | Refills: 0 | Status: SHIPPED | OUTPATIENT
Start: 2025-01-29 | End: 2025-02-08

## 2025-01-29 RX ORDER — FLUCONAZOLE 150 MG/1
150 TABLET ORAL ONCE
Qty: 1 TABLET | Refills: 1 | Status: SHIPPED | OUTPATIENT
Start: 2025-01-29 | End: 2025-01-29

## 2025-01-29 NOTE — PROGRESS NOTES
Chief Complaint   Patient presents with    New Patient    Urinary Frequency        BP (!) 158/92 (Site: Left Upper Arm, Position: Sitting, Cuff Size: Large Adult)   Pulse (!) 109      PHQ-9 score is    Negative        12/10/2024    10:41 AM   Amb Fall Risk Assessment and TUG Test   Do you feel unsteady or are you worried about falling?  yes   2 or more falls in past year? no   Fall with injury in past year? no          1. \"Have you been to the ER, urgent care clinic since your last visit?  Hospitalized since your last visit?\" No    2. \"Have you seen or consulted any other health care providers outside of the [facility] since your last visit?\" No     3. For patients aged 45-75: Has the patient had a colonoscopy / FIT/ Cologuard? NA - based on age      If the patient is female:    4. For patients aged 40-74: Has the patient had a mammogram within the past 2 years? NA - based on age or sex      5. For patients aged 21-65: Has the patient had a pap smear? NA - based on age or sex  
Deferred  Musculoskeletal:         General: Normal range of motion.      Cervical back: Normal range of motion.   Skin:     General: Skin is warm.   Neurological:      General: No focal deficit present.      Mental Status: She is alert and oriented to person, place, and time.   Psychiatric:         Mood and Affect: Mood normal.         Behavior: Behavior normal.         Thought Content: Thought content normal.         Judgment: Judgment normal.         ASSESSMENT and PLAN  1. Frequency of urination  -     AMB POC URINALYSIS DIP STICK AUTO W/O MICRO  -     Culture, Urine  -     AMB POC PVR, CLARIBEL,POST-VOID RES,US,NON-IMAGING  -     doxycycline hyclate (VIBRA-TABS) 100 MG tablet; Take 1 tablet by mouth 2 times daily for 10 days, Disp-20 tablet, R-0Normal  2. Pyuria  -     Culture, Urine  -     Culture, Fungus  -     doxycycline hyclate (VIBRA-TABS) 100 MG tablet; Take 1 tablet by mouth 2 times daily for 10 days, Disp-20 tablet, R-0Normal  3. Type 2 diabetes mellitus treated with insulin ([facility])  -     Culture, Fungus  4. Yeast vaginitis  -     fluconazole (DIFLUCAN) 150 MG tablet; Take 1 tablet by mouth once for 1 dose, Disp-1 tablet, R-1Normal  5. Cystitis  -     doxycycline hyclate (VIBRA-TABS) 100 MG tablet; Take 1 tablet by mouth 2 times daily for 10 days, Disp-20 tablet, R-0Normal  6. Atrophic urethritis  -     estradiol (ESTRACE VAGINAL) 0.1 MG/GM vaginal cream; Place 1 g vaginally three times a week Use pea sized amount on fingertip directly to the opening of her urethra and vagina Monday Wednesday Friday do not use applicator, Disp-42.5 g, R-3Print  7. Overactive bladder  8. Morbid obesity with BMI of 50.0-59.9, adult    Her PVR was 1 cc    Dennis Shaikh MD      Please note that portions of this note was potentially completed with Dragon dictation, the computer voice recognition software.  Quite often unanticipated grammatical, syntax, homophones, and other interpretive errors are inadvertently transcribed by

## 2025-02-02 LAB
BACTERIA UR CULT: ABNORMAL
BACTERIA UR CULT: ABNORMAL

## 2025-02-04 LAB
BACTERIA UR CULT: ABNORMAL
BACTERIA UR CULT: ABNORMAL

## 2025-03-07 ENCOUNTER — OFFICE VISIT (OUTPATIENT)
Age: 80
End: 2025-03-07
Payer: MEDICAID

## 2025-03-07 VITALS
HEART RATE: 106 BPM | HEIGHT: 57 IN | RESPIRATION RATE: 18 BRPM | SYSTOLIC BLOOD PRESSURE: 116 MMHG | TEMPERATURE: 97.6 F | WEIGHT: 247.4 LBS | OXYGEN SATURATION: 97 % | BODY MASS INDEX: 53.37 KG/M2 | DIASTOLIC BLOOD PRESSURE: 69 MMHG

## 2025-03-07 DIAGNOSIS — Z79.4 TYPE 2 DIABETES MELLITUS WITH HYPERGLYCEMIA, WITH LONG-TERM CURRENT USE OF INSULIN (HCC): Primary | ICD-10-CM

## 2025-03-07 DIAGNOSIS — E11.65 TYPE 2 DIABETES MELLITUS WITH HYPERGLYCEMIA, WITH LONG-TERM CURRENT USE OF INSULIN (HCC): Primary | ICD-10-CM

## 2025-03-07 LAB — GLUCOSE, POC: 159 MG/DL

## 2025-03-07 PROCEDURE — 1159F MED LIST DOCD IN RCRD: CPT | Performed by: STUDENT IN AN ORGANIZED HEALTH CARE EDUCATION/TRAINING PROGRAM

## 2025-03-07 PROCEDURE — G8427 DOCREV CUR MEDS BY ELIG CLIN: HCPCS | Performed by: STUDENT IN AN ORGANIZED HEALTH CARE EDUCATION/TRAINING PROGRAM

## 2025-03-07 PROCEDURE — 3074F SYST BP LT 130 MM HG: CPT | Performed by: STUDENT IN AN ORGANIZED HEALTH CARE EDUCATION/TRAINING PROGRAM

## 2025-03-07 PROCEDURE — 1036F TOBACCO NON-USER: CPT | Performed by: STUDENT IN AN ORGANIZED HEALTH CARE EDUCATION/TRAINING PROGRAM

## 2025-03-07 PROCEDURE — 1090F PRES/ABSN URINE INCON ASSESS: CPT | Performed by: STUDENT IN AN ORGANIZED HEALTH CARE EDUCATION/TRAINING PROGRAM

## 2025-03-07 PROCEDURE — G8400 PT W/DXA NO RESULTS DOC: HCPCS | Performed by: STUDENT IN AN ORGANIZED HEALTH CARE EDUCATION/TRAINING PROGRAM

## 2025-03-07 PROCEDURE — 82962 GLUCOSE BLOOD TEST: CPT | Performed by: STUDENT IN AN ORGANIZED HEALTH CARE EDUCATION/TRAINING PROGRAM

## 2025-03-07 PROCEDURE — 3078F DIAST BP <80 MM HG: CPT | Performed by: STUDENT IN AN ORGANIZED HEALTH CARE EDUCATION/TRAINING PROGRAM

## 2025-03-07 PROCEDURE — 1124F ACP DISCUSS-NO DSCNMKR DOCD: CPT | Performed by: STUDENT IN AN ORGANIZED HEALTH CARE EDUCATION/TRAINING PROGRAM

## 2025-03-07 PROCEDURE — 1125F AMNT PAIN NOTED PAIN PRSNT: CPT | Performed by: STUDENT IN AN ORGANIZED HEALTH CARE EDUCATION/TRAINING PROGRAM

## 2025-03-07 PROCEDURE — G8417 CALC BMI ABV UP PARAM F/U: HCPCS | Performed by: STUDENT IN AN ORGANIZED HEALTH CARE EDUCATION/TRAINING PROGRAM

## 2025-03-07 PROCEDURE — 99203 OFFICE O/P NEW LOW 30 MIN: CPT | Performed by: STUDENT IN AN ORGANIZED HEALTH CARE EDUCATION/TRAINING PROGRAM

## 2025-03-07 RX ORDER — DULAGLUTIDE 0.75 MG/.5ML
INJECTION, SOLUTION SUBCUTANEOUS
COMMUNITY
Start: 2025-02-24

## 2025-03-07 RX ORDER — AMMONIUM LACTATE 12 G/100G
LOTION TOPICAL PRN
COMMUNITY

## 2025-03-07 RX ORDER — ONDANSETRON 4 MG/1
TABLET, ORALLY DISINTEGRATING ORAL
COMMUNITY

## 2025-03-07 RX ORDER — OXYMETAZOLINE HYDROCHLORIDE 0.05 G/100ML
2 SPRAY NASAL 2 TIMES DAILY
COMMUNITY

## 2025-03-07 ASSESSMENT — ENCOUNTER SYMPTOMS
EYES NEGATIVE: 1
COUGH: 0
VOMITING: 0
ABDOMINAL DISTENTION: 0
GASTROINTESTINAL NEGATIVE: 1
TROUBLE SWALLOWING: 0
SHORTNESS OF BREATH: 0
CONSTIPATION: 0
NAUSEA: 0
RESPIRATORY NEGATIVE: 1
DIARRHEA: 0
SORE THROAT: 0
EYE DISCHARGE: 0
EYE REDNESS: 0

## 2025-03-07 NOTE — PROGRESS NOTES
\"Have you been to the ER, urgent care clinic since your last visit?  Hospitalized since your last visit?\"    YES - When: approximately 1 months ago.  Where and Why: [facility] Nose bleed and fall.Sugar was also elevated    “Have you seen or consulted any other health care providers outside our system since your last visit?”    YES - When: approximately 1  weeks ago.  Where and Why: PCP.      Chief Complaint   Patient presents with    Diabetes    New Patient     BS-159    /69 (Site: Left Upper Arm, Position: Sitting, Cuff Size: Medium Adult)   Pulse (!) 106   Temp 97.6 °F (36.4 °C) (Temporal)   Resp 18   Ht 1.448 m (4' 9\")   Wt 112.2 kg (247 lb 6.4 oz)   SpO2 97%   BMI 53.54 kg/m²        
by mouth daily    sertraline (ZOLOFT) 25 MG tablet Take 0.5 tablets by mouth nightly    ARIPiprazole (ABILIFY) 20 MG tablet Take 1 tablet by mouth in the morning and at bedtime    aspirin 81 MG EC tablet Take 1 tablet by mouth daily    atorvastatin (LIPITOR) 20 MG tablet Take 1 tablet by mouth daily    nitroGLYCERIN (NITROSTAT) 0.4 MG SL tablet Take 1 tablet by mouth    umeclidinium bromide (INCRUSE ELLIPTA) 62.5 MCG/ACT inhaler Take 1 puff by mouth daily     No current facility-administered medications for this visit.       Social History     Socioeconomic History    Marital status: Single     Spouse name: Not on file    Number of children: Not on file    Years of education: Not on file    Highest education level: Not on file   Occupational History    Not on file   Tobacco Use    Smoking status: Former     Current packs/day: 0.00     Types: Cigarettes     Quit date: 3/24/1980     Years since quittin.9    Smokeless tobacco: Never   Substance and Sexual Activity    Alcohol use: No    Drug use: Never    Sexual activity: Not on file   Other Topics Concern    Not on file   Social History Narrative         ** Merged History Encounter **     Social Determinants of Health     Financial Resource Strain: Not on file   Food Insecurity: Not on file   Transportation Needs: Not on file   Physical Activity: Not on file   Stress: Not on file   Social Connections: Not on file   Intimate Partner Violence: Not on file   Housing Stability: Not on file         Allergies   Allergen Reactions    Azithromycin Other (See Comments)     hallucinations    Diphenhydramine Other (See Comments)     hallucinations        Erythromycin Hallucinations    Quetiapine Other (See Comments)     Hallucinations.        Iodine Itching    Sulfa Antibiotics Itching    Tomato Itching     Tomato sauce     Milk-Related Compounds Diarrhea         Review of Systems:   Review of Systems   Constitutional: Negative.  Negative for activity change, appetite change

## 2025-03-24 ENCOUNTER — TELEPHONE (OUTPATIENT)
Age: 80
End: 2025-03-24

## 2025-03-24 NOTE — TELEPHONE ENCOUNTER
PATIENT CALLED IN AND WANTS A CALL BACK TO GO OVER HER UPCOMING LABS SHE STATED SHE IS UNCLEAR ON INSTRUCTIONS    PATIENT STATED SHE HAD A BONE DENSITY TEST AT [facility] AND IS ASKING IF WE CAN USE THOSE RESULTS     ALSO PATIENT IS CONCERNED ABOUT SUGAR LEVELS DROPPING WHILE ON TRULICITY     PATIENT PHONE NUMBER [phone]

## 2025-03-26 ENCOUNTER — FOLLOWUP TELEPHONE ENCOUNTER (OUTPATIENT)
Age: 80
End: 2025-03-26

## 2025-03-27 LAB
ALBUMIN/CREAT UR: 32 MG/G CREAT (ref 0–29)
CREAT UR-MCNC: 57.6 MG/DL
MICROALBUMIN UR-MCNC: 18.5 UG/ML

## 2025-04-16 ENCOUNTER — OFFICE VISIT (OUTPATIENT)
Age: 80
End: 2025-04-16
Payer: MEDICARE

## 2025-04-16 VITALS
TEMPERATURE: 98.9 F | DIASTOLIC BLOOD PRESSURE: 69 MMHG | WEIGHT: 240.3 LBS | SYSTOLIC BLOOD PRESSURE: 111 MMHG | HEART RATE: 113 BPM | OXYGEN SATURATION: 98 % | HEIGHT: 57 IN | BODY MASS INDEX: 51.84 KG/M2 | RESPIRATION RATE: 18 BRPM

## 2025-04-16 DIAGNOSIS — R00.0 TACHYCARDIA: ICD-10-CM

## 2025-04-16 DIAGNOSIS — Z79.4 TYPE 2 DIABETES MELLITUS WITH HYPERGLYCEMIA, WITH LONG-TERM CURRENT USE OF INSULIN (HCC): Primary | ICD-10-CM

## 2025-04-16 DIAGNOSIS — E11.65 TYPE 2 DIABETES MELLITUS WITH HYPERGLYCEMIA, WITH LONG-TERM CURRENT USE OF INSULIN (HCC): Primary | ICD-10-CM

## 2025-04-16 LAB
GLUCOSE, POC: 291 MG/DL
HBA1C MFR BLD: 9.9 %

## 2025-04-16 PROCEDURE — 83036 HEMOGLOBIN GLYCOSYLATED A1C: CPT | Performed by: STUDENT IN AN ORGANIZED HEALTH CARE EDUCATION/TRAINING PROGRAM

## 2025-04-16 PROCEDURE — 3074F SYST BP LT 130 MM HG: CPT | Performed by: STUDENT IN AN ORGANIZED HEALTH CARE EDUCATION/TRAINING PROGRAM

## 2025-04-16 PROCEDURE — 3046F HEMOGLOBIN A1C LEVEL >9.0%: CPT | Performed by: STUDENT IN AN ORGANIZED HEALTH CARE EDUCATION/TRAINING PROGRAM

## 2025-04-16 PROCEDURE — 1036F TOBACCO NON-USER: CPT | Performed by: STUDENT IN AN ORGANIZED HEALTH CARE EDUCATION/TRAINING PROGRAM

## 2025-04-16 PROCEDURE — 82962 GLUCOSE BLOOD TEST: CPT | Performed by: STUDENT IN AN ORGANIZED HEALTH CARE EDUCATION/TRAINING PROGRAM

## 2025-04-16 PROCEDURE — 3078F DIAST BP <80 MM HG: CPT | Performed by: STUDENT IN AN ORGANIZED HEALTH CARE EDUCATION/TRAINING PROGRAM

## 2025-04-16 PROCEDURE — 99214 OFFICE O/P EST MOD 30 MIN: CPT | Performed by: STUDENT IN AN ORGANIZED HEALTH CARE EDUCATION/TRAINING PROGRAM

## 2025-04-16 PROCEDURE — G8400 PT W/DXA NO RESULTS DOC: HCPCS | Performed by: STUDENT IN AN ORGANIZED HEALTH CARE EDUCATION/TRAINING PROGRAM

## 2025-04-16 PROCEDURE — G8417 CALC BMI ABV UP PARAM F/U: HCPCS | Performed by: STUDENT IN AN ORGANIZED HEALTH CARE EDUCATION/TRAINING PROGRAM

## 2025-04-16 PROCEDURE — 1125F AMNT PAIN NOTED PAIN PRSNT: CPT | Performed by: STUDENT IN AN ORGANIZED HEALTH CARE EDUCATION/TRAINING PROGRAM

## 2025-04-16 PROCEDURE — 1090F PRES/ABSN URINE INCON ASSESS: CPT | Performed by: STUDENT IN AN ORGANIZED HEALTH CARE EDUCATION/TRAINING PROGRAM

## 2025-04-16 PROCEDURE — 1159F MED LIST DOCD IN RCRD: CPT | Performed by: STUDENT IN AN ORGANIZED HEALTH CARE EDUCATION/TRAINING PROGRAM

## 2025-04-16 PROCEDURE — G8427 DOCREV CUR MEDS BY ELIG CLIN: HCPCS | Performed by: STUDENT IN AN ORGANIZED HEALTH CARE EDUCATION/TRAINING PROGRAM

## 2025-04-16 PROCEDURE — 1124F ACP DISCUSS-NO DSCNMKR DOCD: CPT | Performed by: STUDENT IN AN ORGANIZED HEALTH CARE EDUCATION/TRAINING PROGRAM

## 2025-04-16 NOTE — PATIENT INSTRUCTIONS
Increase Lantus to 48 units once daily in the morning before breakfast       Start the following sliding scale for Humalog insulin before every meals     <150- 0 units   150-200- 5 units   201-250- 7units   251-300- 9 units   301-350- 10 units   351- 400- 12 units   > 400- 14 units  and call MD

## 2025-04-16 NOTE — PROGRESS NOTES
\"Have you been to the ER, urgent care clinic since your last visit?  Hospitalized since your last visit?\"    NO    “Have you seen or consulted any other health care providers outside our system since your last visit?”    YES - When: approximately 1 months ago.  Where and Why: Dr. Muhammad-eye doctor and [facility] for follow up.        A1C 9.9    Chief Complaint   Patient presents with    Follow-up    Diabetes     /69 (BP Site: Right Upper Arm, Patient Position: Sitting, BP Cuff Size: Large Adult)   Pulse (!) 113   Temp 98.9 °F (37.2 °C) (Temporal)   Resp 18   Ht 1.448 m (4' 9\")   Wt 109 kg (240 lb 4.8 oz)   SpO2 98%   BMI 52.00 kg/m²

## 2025-04-16 NOTE — PROGRESS NOTES
[facility] [place] [facility]                                      Patient Information Name : Nazia Garduno 79 y.o.   YOB: 1945         Referred by: Zee Price MD         Chief Complaint   Patient presents with    Follow-up    Diabetes       History of Present Illness: Nazia Garduno is a 79 y.o. female here for follow up visit of  Diabetes Mellitus.     Interval hx-     4-:   Not able to tolerate trulicity     Blood glucose logs reviewed: having elevated blood glucose both fasting and post prandial     Background hx-   Diabetes mellitus was diagnosed in 20 years  . End organ effects of diabetes: bilateral retinopathy and had laser injections       Monitoring frequency:3 /day and readings run reading between : 114-200s  Last A1C was high at 10.8%   Hypoglycemia: No    Weight trend: stable  Prior visit with dietician: no    Current exercise: none    Current medications:   Humalog 35 units in the morning and at bedtime   Lantus at 40 units at HS ( unknown dose)   Januvia 100 daily   Lipitor 20 mg daily     Was on Jardiance in the past and it was stopped because of diarrhea     Was on metformin and had diarrhea   Last eye exam was 1 year ago , due for this year   Eye exam current (within one year):   ARJUN: unknown     No chest pain,blurred vision; has hx MI, heart failure, peripheral vascular disease   No history of pancreatitis, no hx medullary thyroid carcinoma     Wt Readings from Last 3 Encounters:   04/16/25 109 kg (240 lb 4.8 oz)   03/07/25 112.2 kg (247 lb 6.4 oz)   11/13/24 114.3 kg (252 lb)       BP Readings from Last 3 Encounters:   04/16/25 111/69   03/07/25 116/69   01/29/25 (!) 158/92           Past Medical History:   Diagnosis Date    A-fib (HCC)     Acid indigestion 3/24/2016    Anxiety 3/24/2016    Arthritis     Asthma     Asthma 3/24/2016    Blurred vision, bilateral 3/24/2016    CAD (coronary artery disease)     Chest pain 3/24/2016    sometimes

## 2025-04-18 ENCOUNTER — RESULTS FOLLOW-UP (OUTPATIENT)
Age: 80
End: 2025-04-18

## 2025-04-18 LAB
T3 SERPL-MCNC: 106 NG/DL (ref 71–180)
T4 FREE SERPL-MCNC: 1.37 NG/DL (ref 0.82–1.77)
TSH SERPL DL<=0.005 MIU/L-ACNC: 1.9 UIU/ML (ref 0.45–4.5)

## 2025-04-18 NOTE — RESULT ENCOUNTER NOTE
Can you please let the patient know that the thyroid labs look good. And the elevated heart rate is due to something else, needs to be evaluated by PCP, but not coming from thyroid. Thanks

## 2025-04-24 ENCOUNTER — HOSPITAL ENCOUNTER (EMERGENCY)
Facility: HOSPITAL | Age: 80
Discharge: HOME | End: 2025-04-24
Payer: MEDICARE

## 2025-04-24 VITALS
BODY MASS INDEX: 50.92 KG/M2 | TEMPERATURE: 98.1 F | WEIGHT: 242.6 LBS | HEIGHT: 58 IN | HEART RATE: 82 BPM | OXYGEN SATURATION: 96 % | SYSTOLIC BLOOD PRESSURE: 119 MMHG | RESPIRATION RATE: 17 BRPM | DIASTOLIC BLOOD PRESSURE: 61 MMHG

## 2025-04-24 DIAGNOSIS — R73.9 HYPERGLYCEMIA: Primary | ICD-10-CM

## 2025-04-24 LAB
ALBUMIN SERPL-MCNC: 3.6 G/DL (ref 3.5–5)
ALBUMIN/GLOB SERPL: 0.9 (ref 1.1–2.2)
ALP SERPL-CCNC: 85 U/L (ref 45–117)
ALT SERPL-CCNC: 21 U/L (ref 12–78)
ANION GAP SERPL CALC-SCNC: 6 MMOL/L (ref 2–12)
AST SERPL W P-5'-P-CCNC: 17 U/L (ref 15–37)
BASOPHILS # BLD: 0.04 K/UL (ref 0–0.1)
BASOPHILS NFR BLD: 0.6 % (ref 0–1)
BILIRUB SERPL-MCNC: 0.6 MG/DL (ref 0.2–1)
BUN SERPL-MCNC: 21 MG/DL (ref 6–20)
BUN/CREAT SERPL: 17 (ref 12–20)
CA-I BLD-MCNC: 9.4 MG/DL (ref 8.5–10.1)
CHLORIDE SERPL-SCNC: 102 MMOL/L (ref 97–108)
CO2 SERPL-SCNC: 27 MMOL/L (ref 21–32)
CREAT SERPL-MCNC: 1.22 MG/DL (ref 0.55–1.02)
DIFFERENTIAL METHOD BLD: ABNORMAL
EKG ATRIAL RATE: 92 BPM
EKG DIAGNOSIS: NORMAL
EKG P AXIS: 35 DEGREES
EKG P-R INTERVAL: 176 MS
EKG Q-T INTERVAL: 356 MS
EKG QRS DURATION: 92 MS
EKG QTC CALCULATION (BAZETT): 440 MS
EKG R AXIS: 10 DEGREES
EKG T AXIS: 10 DEGREES
EKG VENTRICULAR RATE: 92 BPM
EOSINOPHIL # BLD: 0.12 K/UL (ref 0–0.4)
EOSINOPHIL NFR BLD: 1.8 % (ref 0–7)
ERYTHROCYTE [DISTWIDTH] IN BLOOD BY AUTOMATED COUNT: 13.3 % (ref 11.5–14.5)
GLOBULIN SER CALC-MCNC: 4 G/DL (ref 2–4)
GLUCOSE BLD STRIP.AUTO-MCNC: 249 MG/DL (ref 65–100)
GLUCOSE SERPL-MCNC: 247 MG/DL (ref 65–100)
HCT VFR BLD AUTO: 38.8 % (ref 35–47)
HGB BLD-MCNC: 12.7 G/DL (ref 11.5–16)
IMM GRANULOCYTES # BLD AUTO: 0.05 K/UL (ref 0–0.04)
IMM GRANULOCYTES NFR BLD AUTO: 0.8 % (ref 0–0.5)
LYMPHOCYTES # BLD: 2.26 K/UL (ref 0.8–3.5)
LYMPHOCYTES NFR BLD: 34 % (ref 12–49)
MCH RBC QN AUTO: 28.5 PG (ref 26–34)
MCHC RBC AUTO-ENTMCNC: 32.7 G/DL (ref 30–36.5)
MCV RBC AUTO: 87 FL (ref 80–99)
MONOCYTES # BLD: 0.65 K/UL (ref 0–1)
MONOCYTES NFR BLD: 9.8 % (ref 5–13)
NEUTS SEG # BLD: 3.53 K/UL (ref 1.8–8)
NEUTS SEG NFR BLD: 53 % (ref 32–75)
NRBC # BLD: 0 K/UL (ref 0–0.01)
NRBC BLD-RTO: 0 PER 100 WBC
PERFORMED BY:: ABNORMAL
PLATELET # BLD AUTO: 216 K/UL (ref 150–400)
PMV BLD AUTO: 11.5 FL (ref 8.9–12.9)
POTASSIUM SERPL-SCNC: 4.2 MMOL/L (ref 3.5–5.1)
PROT SERPL-MCNC: 7.6 G/DL (ref 6.4–8.2)
RBC # BLD AUTO: 4.46 M/UL (ref 3.8–5.2)
SODIUM SERPL-SCNC: 135 MMOL/L (ref 136–145)
WBC # BLD AUTO: 6.7 K/UL (ref 3.6–11)

## 2025-04-24 PROCEDURE — 36415 COLL VENOUS BLD VENIPUNCTURE: CPT

## 2025-04-24 PROCEDURE — 82962 GLUCOSE BLOOD TEST: CPT

## 2025-04-24 PROCEDURE — 6370000000 HC RX 637 (ALT 250 FOR IP): Performed by: NURSE PRACTITIONER

## 2025-04-24 PROCEDURE — 99284 EMERGENCY DEPT VISIT MOD MDM: CPT

## 2025-04-24 PROCEDURE — 85025 COMPLETE CBC W/AUTO DIFF WBC: CPT

## 2025-04-24 PROCEDURE — 80053 COMPREHEN METABOLIC PANEL: CPT

## 2025-04-24 PROCEDURE — 93005 ELECTROCARDIOGRAM TRACING: CPT | Performed by: NURSE PRACTITIONER

## 2025-04-24 RX ORDER — ACETAMINOPHEN 325 MG/1
650 TABLET ORAL
Status: COMPLETED | OUTPATIENT
Start: 2025-04-24 | End: 2025-04-24

## 2025-04-24 RX ADMIN — ACETAMINOPHEN 650 MG: 325 TABLET ORAL at 13:51

## 2025-04-24 ASSESSMENT — PAIN - FUNCTIONAL ASSESSMENT: PAIN_FUNCTIONAL_ASSESSMENT: 0-10

## 2025-04-24 ASSESSMENT — PAIN SCALES - GENERAL: PAINLEVEL_OUTOF10: 9

## 2025-04-24 NOTE — ED TRIAGE NOTES
Pt present with c/o of high blood sugar of 302. Headache, pain in knees . Currently on abx for UTI. Bs 249

## 2025-04-24 NOTE — ED PROVIDER NOTES
[facility] EMERGENCY DEPT  EMERGENCY DEPARTMENT HISTORY AND PHYSICAL EXAM      Date of evaluation: 4/24/2025  Patient Name: Nazia Garduno  Birthdate 1945  MRN: [id number]  ED Provider: ROBLES Sotomayor NP   Note Started: 3:42 PM EDT 4/24/25    HISTORY OF PRESENT ILLNESS     Chief Complaint   Patient presents with    Hyperglycemia    Knee Pain    Headache       History Provided By: Patient, {Historian:[id number]}     HPI: Nazia Garduno is a 79 y.o. female ***    PAST MEDICAL HISTORY   Past Medical History:  Past Medical History:   Diagnosis Date    A-fib (HCC)     Acid indigestion 3/24/2016    Anxiety 3/24/2016    Arthritis     Asthma     Asthma 3/24/2016    Blurred vision, bilateral 3/24/2016    CAD (coronary artery disease)     Chest pain 3/24/2016    sometimes    Constipation 3/24/2016    Coughing 3/24/2016    Diabetes ([facility])     Dizziness 3/24/2016    Fast heart beat 3/24/2016    sometimes    Frequency of urination 3/24/2016    Frequent headaches 3/24/2016    Heart failure ([facility])     Hemorrhoids 3/24/2016    Hernia, hiatal 3/24/2016    Never treated for it, was told they would not operate on her because of her diabetes.    High blood pressure 3/24/2016    Hypercholesteremia     Hypertension     Joint swelling 3/24/2016    MI (myocardial infarction) ([facility]) 2003    Multiple joint pain 3/24/2016    Multiple stiff joints 3/24/2016    Muscle ache 3/24/2016    Muscle pain 3/24/2016    Neuropathy     ALMA DELIA (obstructive sleep apnea)     PVD (peripheral vascular disease)     Sinus congestion 3/24/2016    Sleep apnea 3/24/2016    Sleeping difficulty 3/24/2016    Stomach pain 3/24/2016    Only sometimes    Stress 3/24/2016    Type II diabetes mellitus (HCC) 3/24/2016       Past Surgical History:  Past Surgical History:   Procedure Laterality Date    CARDIAC PROCEDURE N/A 7/20/2023    Left heart cath / coronary angiography performed by Tyson Whitley MD at [facility] CARDIAC CATH LAB    CARDIAC PROCEDURE N/A 7/20/2023    Percutaneous  Hallucinations    Quetiapine Other (See Comments)     Hallucinations.        Iodine Itching    Sulfa Antibiotics Itching    Tomato Itching     Tomato sauce     Milk-Related Compounds Diarrhea       PCP: None, None    Current Meds:   No current facility-administered medications for this encounter.     Current Outpatient Medications   Medication Sig Dispense Refill    ondansetron (ZOFRAN-ODT) 4 MG disintegrating tablet Take by mouth      ammonium lactate (LAC-HYDRIN) 12 % lotion Apply topically as needed for Dry Skin Apply topically as needed.      oxymetazoline (AFRIN) 0.05 % nasal spray 2 sprays by Nasal route 2 times daily      estradiol (ESTRACE VAGINAL) 0.1 MG/GM vaginal cream Place 1 g vaginally three times a week Use pea sized amount on fingertip directly to the opening of her urethra and vagina Monday Wednesday Friday do not use applicator (Patient not taking: Reported on 4/16/2025) 42.5 g 3    cholestyramine (QUESTRAN) 4 g packet Take 1 packet by mouth 2 times daily 90 packet 3    pregabalin (LYRICA) 150 MG capsule Take 1 capsule by mouth at bedtime for 60 days. Max Daily Amount: 150 mg 60 capsule 0    alendronate (FOSAMAX) 70 MG tablet Take 1 tablet by mouth every 7 days      insulin glargine (LANTUS) 100 UNIT/ML injection vial Inject 40 Units into the skin nightly      losartan-hydroCHLOROthiazide (HYZAAR) 100-12.5 MG per tablet Take 1 tablet by mouth daily 90 tablet 1    acetaminophen (TYLENOL) 500 MG tablet Take 1 tablet by mouth every 6 hours as needed for Pain or Fever      diclofenac sodium (VOLTAREN) 1 % GEL Apply 2 g topically 4 times daily as needed for Pain      ACCU-CHEK GUIDE strip 1 each daily      HUMALOG KWIKPEN 100 UNIT/ML SOPN Inject into the skin in the morning and at bedtime Sliding Scale      BD PEN NEEDLE SHARON U/F 32G X 4 MM MISC 1 each      latanoprost (XALATAN) 0.005 % ophthalmic solution Place 1 drop into both eyes at bedtime      Hydrocortisone, Perianal, (PREPARATION H) 1 % cream

## 2025-05-12 ENCOUNTER — TELEPHONE (OUTPATIENT)
Age: 80
End: 2025-05-12

## 2025-05-12 ENCOUNTER — HOSPITAL ENCOUNTER (EMERGENCY)
Facility: HOSPITAL | Age: 80
Discharge: HOME | End: 2025-05-12
Payer: MEDICARE

## 2025-05-12 VITALS
SYSTOLIC BLOOD PRESSURE: 164 MMHG | WEIGHT: 242 LBS | DIASTOLIC BLOOD PRESSURE: 78 MMHG | RESPIRATION RATE: 18 BRPM | TEMPERATURE: 98.5 F | HEART RATE: 99 BPM | OXYGEN SATURATION: 96 % | BODY MASS INDEX: 50.8 KG/M2 | HEIGHT: 58 IN

## 2025-05-12 DIAGNOSIS — N39.0 COMPLICATED UTI (URINARY TRACT INFECTION): ICD-10-CM

## 2025-05-12 DIAGNOSIS — E11.65 HYPERGLYCEMIA DUE TO DIABETES MELLITUS (HCC): Primary | ICD-10-CM

## 2025-05-12 LAB
ALBUMIN SERPL-MCNC: 3 G/DL (ref 3.5–5)
ALBUMIN/GLOB SERPL: 0.8 (ref 1.1–2.2)
ALP SERPL-CCNC: 84 U/L (ref 45–117)
ALT SERPL-CCNC: 17 U/L (ref 12–78)
ANION GAP SERPL CALC-SCNC: 7 MMOL/L (ref 2–12)
APPEARANCE UR: ABNORMAL
AST SERPL W P-5'-P-CCNC: 18 U/L (ref 15–37)
BACTERIA URNS QL MICRO: NEGATIVE /HPF
BASE EXCESS BLDV CALC-SCNC: 1.8 MMOL/L
BASOPHILS # BLD: 0.02 K/UL (ref 0–0.1)
BASOPHILS NFR BLD: 0.3 % (ref 0–1)
BILIRUB SERPL-MCNC: 0.4 MG/DL (ref 0.2–1)
BILIRUB UR QL: NEGATIVE
BUN SERPL-MCNC: 23 MG/DL (ref 6–20)
BUN/CREAT SERPL: 17 (ref 12–20)
CA-I BLD-MCNC: 9 MG/DL (ref 8.5–10.1)
CHLORIDE SERPL-SCNC: 100 MMOL/L (ref 97–108)
CO2 SERPL-SCNC: 26 MMOL/L (ref 21–32)
COLOR UR: ABNORMAL
CREAT SERPL-MCNC: 1.32 MG/DL (ref 0.55–1.02)
DIFFERENTIAL METHOD BLD: ABNORMAL
EOSINOPHIL # BLD: 0.11 K/UL (ref 0–0.4)
EOSINOPHIL NFR BLD: 1.9 % (ref 0–7)
EPITH CASTS URNS QL MICRO: ABNORMAL /LPF
ERYTHROCYTE [DISTWIDTH] IN BLOOD BY AUTOMATED COUNT: 13.5 % (ref 11.5–14.5)
GLOBULIN SER CALC-MCNC: 3.7 G/DL (ref 2–4)
GLUCOSE BLD STRIP.AUTO-MCNC: 347 MG/DL (ref 65–100)
GLUCOSE BLD STRIP.AUTO-MCNC: 404 MG/DL (ref 65–100)
GLUCOSE SERPL-MCNC: 450 MG/DL (ref 65–100)
GLUCOSE UR STRIP.AUTO-MCNC: >500 MG/DL
HCO3 BLDV-SCNC: 27.2 MMOL/L (ref 22–26)
HCT VFR BLD AUTO: 35.6 % (ref 35–47)
HGB BLD-MCNC: 11.7 G/DL (ref 11.5–16)
HGB UR QL STRIP: NEGATIVE
IMM GRANULOCYTES # BLD AUTO: 0.06 K/UL (ref 0–0.04)
IMM GRANULOCYTES NFR BLD AUTO: 1 % (ref 0–0.5)
KETONES UR QL STRIP.AUTO: NEGATIVE MG/DL
LEUKOCYTE ESTERASE UR QL STRIP.AUTO: ABNORMAL
LYMPHOCYTES # BLD: 1.96 K/UL (ref 0.8–3.5)
LYMPHOCYTES NFR BLD: 33.4 % (ref 12–49)
MCH RBC QN AUTO: 28.4 PG (ref 26–34)
MCHC RBC AUTO-ENTMCNC: 32.9 G/DL (ref 30–36.5)
MCV RBC AUTO: 86.4 FL (ref 80–99)
MONOCYTES # BLD: 0.44 K/UL (ref 0–1)
MONOCYTES NFR BLD: 7.5 % (ref 5–13)
MUCOUS THREADS URNS QL MICRO: ABNORMAL /LPF
NEUTS SEG # BLD: 3.28 K/UL (ref 1.8–8)
NEUTS SEG NFR BLD: 55.9 % (ref 32–75)
NITRITE UR QL STRIP.AUTO: POSITIVE
NRBC # BLD: 0 K/UL (ref 0–0.01)
NRBC BLD-RTO: 0 PER 100 WBC
PCO2 BLDV: 45.1 MMHG (ref 41–52)
PERFORMED BY:: ABNORMAL
PH BLDV: 7.39 (ref 7.23–7.44)
PH UR STRIP: 5 (ref 5–8)
PLATELET # BLD AUTO: 178 K/UL (ref 150–400)
PMV BLD AUTO: 11.4 FL (ref 8.9–12.9)
PO2 BLDV: 42 MMHG (ref 25–40)
POTASSIUM SERPL-SCNC: 4.2 MMOL/L (ref 3.5–5.1)
PROT SERPL-MCNC: 6.7 G/DL (ref 6.4–8.2)
PROT UR STRIP-MCNC: NEGATIVE MG/DL
RBC # BLD AUTO: 4.12 M/UL (ref 3.8–5.2)
RBC #/AREA URNS HPF: ABNORMAL /HPF (ref 0–5)
SAO2 % BLDV: 77 %
SODIUM SERPL-SCNC: 133 MMOL/L (ref 136–145)
SP GR UR REFRACTOMETRY: 1.02 (ref 1–1.03)
SPECIMEN TYPE: ABNORMAL
URINE CULTURE IF INDICATED: ABNORMAL
UROBILINOGEN UR QL STRIP.AUTO: 0.1 EU/DL (ref 0.1–1)
WBC # BLD AUTO: 5.9 K/UL (ref 3.6–11)
WBC URNS QL MICRO: ABNORMAL /HPF (ref 0–4)

## 2025-05-12 PROCEDURE — 85025 COMPLETE CBC W/AUTO DIFF WBC: CPT

## 2025-05-12 PROCEDURE — 87086 URINE CULTURE/COLONY COUNT: CPT

## 2025-05-12 PROCEDURE — 6370000000 HC RX 637 (ALT 250 FOR IP): Performed by: NURSE PRACTITIONER

## 2025-05-12 PROCEDURE — 99284 EMERGENCY DEPT VISIT MOD MDM: CPT

## 2025-05-12 PROCEDURE — 87186 SC STD MICRODIL/AGAR DIL: CPT

## 2025-05-12 PROCEDURE — 81001 URINALYSIS AUTO W/SCOPE: CPT

## 2025-05-12 PROCEDURE — 82962 GLUCOSE BLOOD TEST: CPT

## 2025-05-12 PROCEDURE — 87088 URINE BACTERIA CULTURE: CPT

## 2025-05-12 PROCEDURE — 80053 COMPREHEN METABOLIC PANEL: CPT

## 2025-05-12 RX ORDER — 0.9 % SODIUM CHLORIDE 0.9 %
1000 INTRAVENOUS SOLUTION INTRAVENOUS ONCE
Status: DISCONTINUED | OUTPATIENT
Start: 2025-05-12 | End: 2025-05-12

## 2025-05-12 RX ORDER — CIPROFLOXACIN 500 MG/1
500 TABLET, FILM COATED ORAL 2 TIMES DAILY
Qty: 14 TABLET | Refills: 0 | Status: SHIPPED | OUTPATIENT
Start: 2025-05-12 | End: 2025-05-19

## 2025-05-12 RX ORDER — CIPROFLOXACIN 500 MG/1
500 TABLET, FILM COATED ORAL
Status: COMPLETED | OUTPATIENT
Start: 2025-05-12 | End: 2025-05-12

## 2025-05-12 RX ADMIN — INSULIN HUMAN 10 UNITS: 100 INJECTION, SOLUTION PARENTERAL at 15:21

## 2025-05-12 RX ADMIN — CIPROFLOXACIN HYDROCHLORIDE 500 MG: 500 TABLET, FILM COATED ORAL at 17:12

## 2025-05-12 ASSESSMENT — PAIN SCALES - GENERAL: PAINLEVEL_OUTOF10: 3

## 2025-05-12 ASSESSMENT — PAIN - FUNCTIONAL ASSESSMENT: PAIN_FUNCTIONAL_ASSESSMENT: 0-10

## 2025-05-12 NOTE — ED PROVIDER NOTES
Muscle ache 3/24/2016    Muscle pain 3/24/2016    Neuropathy     ALMA DELIA (obstructive sleep apnea)     PVD (peripheral vascular disease)     Sinus congestion 3/24/2016    Sleep apnea 3/24/2016    Sleeping difficulty 3/24/2016    Stomach pain 3/24/2016    Only sometimes    Stress 3/24/2016    Type II diabetes mellitus (HCC) 3/24/2016       Past Surgical History:  Past Surgical History:   Procedure Laterality Date    CARDIAC PROCEDURE N/A 2023    Left heart cath / coronary angiography performed by Tyson Whitley MD at [facility] CARDIAC CATH LAB    CARDIAC PROCEDURE N/A 2023    Percutaneous coronary intervention performed by Tyson Whitley MD at [facility] CARDIAC CATH LAB    CARDIAC PROCEDURE N/A 2023    Angioplasty coronary performed by Tyson Whitley MD at [facility] CARDIAC CATH LAB    CARDIAC PROCEDURE N/A 2023    Insert stent hao coronary performed by Tyson Whitley MD at [facility] CARDIAC CATH LAB    CARDIAC PROCEDURE N/A 2023    Left heart cath / coronary angiography performed by Tyson Whitley MD at [facility] CARDIAC CATH LAB    CARDIAC PROCEDURE N/A 2023    Percutaneous coronary intervention performed by Tyson Whitley MD at [facility] CARDIAC CATH LAB    CARDIAC PROCEDURE N/A 2023    Angioplasty coronary performed by Tyson Whitley MD at [facility] CARDIAC CATH LAB    CARDIAC PROCEDURE N/A 2023    Insert stent hao coronary performed by Tyson Whitley MD at [facility] CARDIAC CATH LAB    CATARACT REMOVAL      both eyes, cyst on left and floaters     DELIVERY ONLY       SECTION      CHOLECYSTECTOMY      COLONOSCOPY      CORONARY ANGIOPLASTY WITH STENT PLACEMENT      GYN      HYSTERECTOMY (CERVIX STATUS UNKNOWN)      [place] UNLISTED PROCEDURE CARDIAC SURGERY         Family History:  Family History   Problem Relation Age of Onset    Diabetes Maternal Aunt     Cancer Maternal Aunt     Diabetes Sister     Osteoporosis Mother     Heart Disease Mother     Heart Disease Father        Social History:  Social History

## 2025-05-12 NOTE — ED TRIAGE NOTES
C/o hyperglycemia x a few days.  Was getting readings >450 at home.     Last took her lantus around 0945 this morning.

## 2025-05-12 NOTE — TELEPHONE ENCOUNTER
Pt's daughter called in wanting to inform provider that for the last few days that her blood sugar levels have been in the 400s. Pt is currently in the hospital now

## 2025-05-12 NOTE — DISCHARGE INSTRUCTIONS
Thank you for choosing our Emergency Department for your care.  It is our privilege to care for you in your time of need.  In the next several days, you may receive a survey via email or mailed to your home about your experience with our team.  We would greatly appreciate you taking a few minutes to complete the survey, as we use this information to learn what we have done well and what we could be doing better. Thank you for trusting us with your care!    Below you will find a list of your tests from today's visit.   Labs and Radiology Studies  Recent Results (from the past 12 hours)   POCT Glucose    Collection Time: 05/12/25 12:59 PM   Result Value Ref Range    POC Glucose 404 (H) 65 - 100 mg/dL    Performed by: Jagjit Griffiths    Venous Blood Gas, POC    Collection Time: 05/12/25  2:28 PM   Result Value Ref Range    PH, VENOUS (POC) 7.39 7.23 - 7.44      PCO2, Valery, POC 45.1 41.0 - 52.0 mmHg    PO2, VENOUS (POC) 42 (H) 25 - 40 mmHg    HCO3, Venous 27.2 (H) 22.0 - 26.0 mmol/L    SO2, VENOUS (POC) 77.0 %    BASE EXCESS, VENOUS (POC) 1.8 mmol/L    Specimen type: Venous      Performed by: ERICKA CARRILLO    CBC with Auto Differential    Collection Time: 05/12/25  2:30 PM   Result Value Ref Range    WBC 5.9 3.6 - 11.0 K/uL    RBC 4.12 3.80 - 5.20 M/uL    Hemoglobin 11.7 11.5 - 16.0 g/dL    Hematocrit 35.6 35.0 - 47.0 %    MCV 86.4 80.0 - 99.0 FL    MCH 28.4 26.0 - 34.0 PG    MCHC 32.9 30.0 - 36.5 g/dL    RDW 13.5 11.5 - 14.5 %    Platelets 178 150 - 400 K/uL    MPV 11.4 8.9 - 12.9 FL    Nucleated RBCs 0.0 0.0  WBC    nRBC 0.00 0.00 - 0.01 K/uL    Neutrophils % 55.9 32.0 - 75.0 %    Lymphocytes % 33.4 12.0 - 49.0 %    Monocytes % 7.5 5.0 - 13.0 %    Eosinophils % 1.9 0.0 - 7.0 %    Basophils % 0.3 0.0 - 1.0 %    Immature Granulocytes % 1.0 (H) 0 - 0.5 %    Neutrophils Absolute 3.28 1.80 - 8.00 K/UL    Lymphocytes Absolute 1.96 0.80 - 3.50 K/UL    Monocytes Absolute 0.44 0.00 - 1.00 K/UL    Eosinophils

## 2025-05-14 ENCOUNTER — TELEPHONE (OUTPATIENT)
Age: 80
End: 2025-05-14

## 2025-05-14 ENCOUNTER — RESULTS FOLLOW-UP (OUTPATIENT)
Facility: HOSPITAL | Age: 80
End: 2025-05-14

## 2025-05-14 NOTE — TELEPHONE ENCOUNTER
Mariana from pt's living facility called to inform you that pt's blood sugar levels were just 469 and the instructions that were given to pt stated if pt's sugars are above 400s to contact provider. Dony can be reached at

## 2025-05-15 LAB
BACTERIA SPEC CULT: ABNORMAL
COLONY COUNT, CNT: ABNORMAL
Lab: ABNORMAL

## 2025-05-16 ENCOUNTER — TELEPHONE (OUTPATIENT)
Age: 80
End: 2025-05-16

## 2025-05-16 RX ORDER — INSULIN LISPRO 100 [IU]/ML
INJECTION, SOLUTION INTRAVENOUS; SUBCUTANEOUS
Qty: 12 ADJUSTABLE DOSE PRE-FILLED PEN SYRINGE | Refills: 3 | Status: SHIPPED | OUTPATIENT
Start: 2025-05-16

## 2025-05-16 RX ORDER — INSULIN GLARGINE 100 [IU]/ML
60 INJECTION, SOLUTION SUBCUTANEOUS DAILY
Qty: 6 ADJUSTABLE DOSE PRE-FILLED PEN SYRINGE | Refills: 2 | Status: SHIPPED | OUTPATIENT
Start: 2025-05-16

## 2025-05-21 ENCOUNTER — OFFICE VISIT (OUTPATIENT)
Age: 80
End: 2025-05-21
Payer: MEDICARE

## 2025-05-21 ENCOUNTER — TELEPHONE (OUTPATIENT)
Age: 80
End: 2025-05-21

## 2025-05-21 VITALS
DIASTOLIC BLOOD PRESSURE: 86 MMHG | SYSTOLIC BLOOD PRESSURE: 138 MMHG | WEIGHT: 241.4 LBS | RESPIRATION RATE: 16 BRPM | OXYGEN SATURATION: 96 % | TEMPERATURE: 97.9 F | BODY MASS INDEX: 50.67 KG/M2 | HEIGHT: 58 IN | HEART RATE: 111 BPM

## 2025-05-21 DIAGNOSIS — Z79.4 TYPE 2 DIABETES MELLITUS WITH HYPERGLYCEMIA, WITH LONG-TERM CURRENT USE OF INSULIN (HCC): Primary | ICD-10-CM

## 2025-05-21 DIAGNOSIS — E11.65 TYPE 2 DIABETES MELLITUS WITH HYPERGLYCEMIA, WITH LONG-TERM CURRENT USE OF INSULIN (HCC): Primary | ICD-10-CM

## 2025-05-21 LAB — GLUCOSE, POC: 311 MG/DL

## 2025-05-21 PROCEDURE — 3079F DIAST BP 80-89 MM HG: CPT | Performed by: STUDENT IN AN ORGANIZED HEALTH CARE EDUCATION/TRAINING PROGRAM

## 2025-05-21 PROCEDURE — 1036F TOBACCO NON-USER: CPT | Performed by: STUDENT IN AN ORGANIZED HEALTH CARE EDUCATION/TRAINING PROGRAM

## 2025-05-21 PROCEDURE — 82962 GLUCOSE BLOOD TEST: CPT | Performed by: STUDENT IN AN ORGANIZED HEALTH CARE EDUCATION/TRAINING PROGRAM

## 2025-05-21 PROCEDURE — 3075F SYST BP GE 130 - 139MM HG: CPT | Performed by: STUDENT IN AN ORGANIZED HEALTH CARE EDUCATION/TRAINING PROGRAM

## 2025-05-21 PROCEDURE — 1124F ACP DISCUSS-NO DSCNMKR DOCD: CPT | Performed by: STUDENT IN AN ORGANIZED HEALTH CARE EDUCATION/TRAINING PROGRAM

## 2025-05-21 PROCEDURE — G8417 CALC BMI ABV UP PARAM F/U: HCPCS | Performed by: STUDENT IN AN ORGANIZED HEALTH CARE EDUCATION/TRAINING PROGRAM

## 2025-05-21 PROCEDURE — 3046F HEMOGLOBIN A1C LEVEL >9.0%: CPT | Performed by: STUDENT IN AN ORGANIZED HEALTH CARE EDUCATION/TRAINING PROGRAM

## 2025-05-21 PROCEDURE — G8400 PT W/DXA NO RESULTS DOC: HCPCS | Performed by: STUDENT IN AN ORGANIZED HEALTH CARE EDUCATION/TRAINING PROGRAM

## 2025-05-21 PROCEDURE — 1126F AMNT PAIN NOTED NONE PRSNT: CPT | Performed by: STUDENT IN AN ORGANIZED HEALTH CARE EDUCATION/TRAINING PROGRAM

## 2025-05-21 PROCEDURE — 1090F PRES/ABSN URINE INCON ASSESS: CPT | Performed by: STUDENT IN AN ORGANIZED HEALTH CARE EDUCATION/TRAINING PROGRAM

## 2025-05-21 PROCEDURE — 99214 OFFICE O/P EST MOD 30 MIN: CPT | Performed by: STUDENT IN AN ORGANIZED HEALTH CARE EDUCATION/TRAINING PROGRAM

## 2025-05-21 PROCEDURE — 1159F MED LIST DOCD IN RCRD: CPT | Performed by: STUDENT IN AN ORGANIZED HEALTH CARE EDUCATION/TRAINING PROGRAM

## 2025-05-21 PROCEDURE — G8427 DOCREV CUR MEDS BY ELIG CLIN: HCPCS | Performed by: STUDENT IN AN ORGANIZED HEALTH CARE EDUCATION/TRAINING PROGRAM

## 2025-05-21 RX ORDER — INSULIN GLARGINE 100 [IU]/ML
INJECTION, SOLUTION SUBCUTANEOUS
Qty: 7 ADJUSTABLE DOSE PRE-FILLED PEN SYRINGE | Refills: 3 | Status: SHIPPED | OUTPATIENT
Start: 2025-05-21

## 2025-05-21 RX ORDER — INSULIN LISPRO 100 [IU]/ML
INJECTION, SOLUTION INTRAVENOUS; SUBCUTANEOUS
Qty: 12 ADJUSTABLE DOSE PRE-FILLED PEN SYRINGE | Refills: 3 | Status: SHIPPED | OUTPATIENT
Start: 2025-05-21

## 2025-05-21 RX ORDER — LOPERAMIDE HYDROCHLORIDE 2 MG/1
2 CAPSULE ORAL 4 TIMES DAILY PRN
COMMUNITY

## 2025-05-21 ASSESSMENT — ENCOUNTER SYMPTOMS
TROUBLE SWALLOWING: 0
ABDOMINAL DISTENTION: 0
RESPIRATORY NEGATIVE: 1
EYES NEGATIVE: 1
SORE THROAT: 0
CONSTIPATION: 0
SHORTNESS OF BREATH: 0
GASTROINTESTINAL NEGATIVE: 1
EYE DISCHARGE: 0
VOMITING: 0
NAUSEA: 0
DIARRHEA: 0
EYE REDNESS: 0
COUGH: 0

## 2025-05-21 NOTE — PROGRESS NOTES
[facility] [place] [facility]                                      Patient Information Name : Nazia Garduno 79 y.o.   YOB: 1945         Referred by: No, Pcp         Chief Complaint   Patient presents with    Diabetes    Follow-up       History of Present Illness: Nazia Garduno is a 79 y.o. female here for follow up visit of  Diabetes Mellitus.     Interval hx-     5-: having hyperglycemia; rfecently diagnosed with complicated UTI, on ciprofloxacin    4-:   Not able to tolerate trulicity     Blood glucose logs reviewed: having elevated blood glucose both fasting and post prandial     Background hx-   Diabetes mellitus was diagnosed in 20 years  . End organ effects of diabetes: bilateral retinopathy and had laser injections       Monitoring frequency:3 /day and readings run reading between : 114-200s  Last A1C was high at 10.8%   Hypoglycemia: No    Weight trend: stable  Prior visit with dietician: no    Current exercise: none    Current medications:   Humalog 35 units in the morning and at bedtime   Lantus at 40 units at HS ( unknown dose)   Januvia 100 daily   Lipitor 20 mg daily     Was on Jardiance in the past and it was stopped because of diarrhea     Was on metformin and had diarrhea   Last eye exam was 1 year ago , due for this year   Eye exam current (within one year):   ARJUN: unknown     No chest pain,blurred vision; has hx MI, heart failure, peripheral vascular disease   No history of pancreatitis, no hx medullary thyroid carcinoma     Wt Readings from Last 3 Encounters:   05/21/25 109.5 kg (241 lb 6.4 oz)   05/12/25 109.8 kg (242 lb)   04/24/25 110 kg (242 lb 9.6 oz)       BP Readings from Last 3 Encounters:   05/21/25 138/86   05/12/25 (!) 164/78   04/24/25 119/61           Past Medical History:   Diagnosis Date    A-fib (HCC)     Acid indigestion 3/24/2016    Anxiety 3/24/2016    Arthritis     Asthma     Asthma 3/24/2016    Blurred vision, bilateral

## 2025-05-21 NOTE — PROGRESS NOTES
Have you been to the ER, urgent care clinic since your last visit?  Hospitalized since your last visit?   YES - When: approximately 3 days ago.  Where and Why: [facility] for high BS.    Have you seen or consulted any other health care providers outside our system since your last visit?   NO    Chief Complaint   Patient presents with    Diabetes    Follow-up     /86 (BP Site: Left Upper Arm, Patient Position: Sitting, BP Cuff Size: Medium Adult)   Pulse (!) 111   Temp 97.9 °F (36.6 °C) (Temporal)   Resp 16   Ht 1.473 m (4' 10\")   Wt 109.5 kg (241 lb 6.4 oz)   SpO2 96%   BMI 50.45 kg/m²     BS-311

## 2025-05-21 NOTE — TELEPHONE ENCOUNTER
Assisted living called in on behalf of patient concerned about her new medications from today they dont want her blood sugar to drop low needs info on the justin Peña [phone]

## 2025-05-21 NOTE — PATIENT INSTRUCTIONS
Start the following sliding scale for Humalog insulin before every meals     <150- 2 units   150-200- 7 units   201-250- 10 units   251-300- 12 units   301-350- 14 units   351- 400- 16 units   > 400- 18 units  and call MD     Increase Lantus to 36 units twice daily

## 2025-05-28 ENCOUNTER — TELEPHONE (OUTPATIENT)
Age: 80
End: 2025-05-28

## 2025-05-28 ENCOUNTER — FOLLOWUP TELEPHONE ENCOUNTER (OUTPATIENT)
Age: 80
End: 2025-05-28

## 2025-05-28 RX ORDER — REPAGLINIDE 1 MG/1
TABLET ORAL
Qty: 90 TABLET | Refills: 3 | Status: SHIPPED | OUTPATIENT
Start: 2025-05-28

## 2025-05-28 NOTE — TELEPHONE ENCOUNTER
Patient daughter Alicia came in office to see if we had any instructions for her mom's sugar which is 479 on what to do provider advised to have assisted living fax over recent glu close levels since 05/21 provider will contact the daughter or assisted living back when she gets the fax

## 2025-05-28 NOTE — PROGRESS NOTES
Spoke with the facility regarding high blood glucose    Fasting mostly in 400s     Prelunch- in high 200s and 300s   predinner- high 300s     Plan-   Start Prandin 1 mg before meals   Continue with the current insulin regimen   Start Diabetic diet - avoid any sweetened drinks, sodas, juices  , tropical fruits like bananas ( berries preferred)   Have asked the facility nurse to fax glucose logs this Friday to see how she her glucose is doing after starting prandin

## 2025-06-26 ENCOUNTER — TELEPHONE (OUTPATIENT)
Age: 80
End: 2025-06-26

## 2025-06-26 ENCOUNTER — FOLLOWUP TELEPHONE ENCOUNTER (OUTPATIENT)
Age: 80
End: 2025-06-26

## 2025-07-07 ENCOUNTER — FOLLOWUP TELEPHONE ENCOUNTER (OUTPATIENT)
Age: 80
End: 2025-07-07

## 2025-07-07 ENCOUNTER — TELEPHONE (OUTPATIENT)
Age: 80
End: 2025-07-07

## 2025-07-07 RX ORDER — INSULIN GLARGINE 100 [IU]/ML
36 INJECTION, SOLUTION SUBCUTANEOUS 2 TIMES DAILY
Qty: 7 ADJUSTABLE DOSE PRE-FILLED PEN SYRINGE | Refills: 3 | Status: SHIPPED | OUTPATIENT
Start: 2025-07-07 | End: 2025-07-09

## 2025-07-07 NOTE — PROGRESS NOTES
Called patient to let her know that lantus has been discontinued and she has been started on basaglar     She di not reply, no VM could be left as voice box was full

## 2025-07-07 NOTE — TELEPHONE ENCOUNTER
Patient having diarrhea with lantus     Will change to basaglar 36 units twice daily     Prescription sent

## 2025-07-09 RX ORDER — INSULIN GLARGINE 300 U/ML
36 INJECTION, SOLUTION SUBCUTANEOUS 2 TIMES DAILY
Qty: 5 ADJUSTABLE DOSE PRE-FILLED PEN SYRINGE | Refills: 2 | Status: SHIPPED | OUTPATIENT
Start: 2025-07-09

## 2025-08-01 DIAGNOSIS — K52.9 CHRONIC DIARRHEA: Primary | ICD-10-CM

## (undated) DEVICE — SURGICAL PROCEDURE TRAY CRD CATH 3 PRT

## (undated) DEVICE — SYRINGE ANGIO 10 CC POLYCARB DK GRN MEDALLION DISP

## (undated) DEVICE — CATHETER GUID 6FR L100CM DIA0.071IN NYL SHFT JL3.5 W/O SIDE

## (undated) DEVICE — VALVE HEMSTAS W/ GWIRE INSRTN TOOL GRDIAN II NC

## (undated) DEVICE — SYRINGE ANGIO 20ML WHT POLYCARB VAC PRSS CAP PLUNG FIX M

## (undated) DEVICE — Device

## (undated) DEVICE — CATH BLLN ANGIO 2X20MM SC EUPHORA RX

## (undated) DEVICE — RUNTHROUGH NS EXTRA FLOPPY PTCA GUIDEWIRE: Brand: RUNTHROUGH

## (undated) DEVICE — GLIDESHEATH SLENDER STAINLESS STEEL KIT: Brand: GLIDESHEATH SLENDER

## (undated) DEVICE — SYRINGE MED 10ML RED POLYCARB BRL FIX M LUER CONN FLAT GRP

## (undated) DEVICE — TOOL INSRT 0022IN S STL GWIRE

## (undated) DEVICE — PRESSURE TUBING: Brand: TRUWAVE

## (undated) DEVICE — MICROPUNCTURE INTRODUCER SET SILHOUETTE TRANSITIONLESS WITH NITINOL WIRE GUIDE: Brand: MICROPUNCTURE

## (undated) DEVICE — WASTEBAG DRIP/ADAPTER: Brand: MEDLINE INDUSTRIES, INC.

## (undated) DEVICE — CATH BLLN ANGIO 2X12MM SC EUPHORA RX

## (undated) DEVICE — COPILOT BLEEDBACK CONTROL VALVE: Brand: COPILOT

## (undated) DEVICE — RADIFOCUS GLIDEWIRE: Brand: GLIDEWIRE

## (undated) DEVICE — Z INACTIVE UOM QTY CHANGE USE 2863475 DEVICE INFL 20ML BRL POLYCARB ANALG

## (undated) DEVICE — 3M™ STERI-DRAPE™ SMALL DRAPE WITH ADHESIVE APERTURE 1092 25/BX,4/CS&#X20;: Brand: STERI-DRAPE™

## (undated) DEVICE — DEVICE TORQ FLRESCNT PNK FOR HEMSTAS VLV

## (undated) DEVICE — BOWL MED M 16OZ PLAS CAP GRAD

## (undated) DEVICE — DEVICE INFL 20ML BRL POLYCARB ANALG LUMINESCENT DISPLAY ANG

## (undated) DEVICE — GLIDESHEATH SLENDER ACCESS KIT: Brand: GLIDESHEATH SLENDER

## (undated) DEVICE — GUIDEWIRE VASC L260CM DIA0.035IN TIP L3MM STD EXCHG PTFE J

## (undated) DEVICE — SYRINGE MED 10ML PUR GAM COMPATIBLE POLYCARB FIX M LUER CONN

## (undated) DEVICE — CATHETER DIAG 5FR L100CM LUMN ID0.047IN JR4 CRV 0 SIDE H

## (undated) DEVICE — CATHETER DIAG 5FR L100CM LUMN ID0.047IN JL3.5 CRV 0 SIDE H